# Patient Record
Sex: FEMALE | Race: BLACK OR AFRICAN AMERICAN | Employment: UNEMPLOYED | URBAN - METROPOLITAN AREA
[De-identification: names, ages, dates, MRNs, and addresses within clinical notes are randomized per-mention and may not be internally consistent; named-entity substitution may affect disease eponyms.]

---

## 2020-07-20 VITALS
BODY MASS INDEX: 26.98 KG/M2 | SYSTOLIC BLOOD PRESSURE: 158 MMHG | OXYGEN SATURATION: 98 % | DIASTOLIC BLOOD PRESSURE: 99 MMHG | HEART RATE: 74 BPM | WEIGHT: 158 LBS | HEIGHT: 64 IN | TEMPERATURE: 99.1 F | RESPIRATION RATE: 18 BRPM

## 2020-07-20 PROBLEM — E78.00 HYPERCHOLESTEREMIA: Status: ACTIVE | Noted: 2019-01-28

## 2020-07-20 PROBLEM — I10 ESSENTIAL (PRIMARY) HYPERTENSION: Status: ACTIVE | Noted: 2019-01-28

## 2020-07-20 PROBLEM — M54.30 SCIATICA: Status: ACTIVE | Noted: 2019-01-28

## 2020-07-20 PROBLEM — F31.9 BIPOLAR DISORDER, UNSPECIFIED (HCC): Status: ACTIVE | Noted: 2019-01-28

## 2020-07-20 RX ORDER — LISINOPRIL AND HYDROCHLOROTHIAZIDE 20; 25 MG/1; MG/1
TABLET ORAL
COMMUNITY
Start: 2020-07-01 | End: 2021-05-14

## 2020-07-20 RX ORDER — ALBUTEROL SULFATE 90 UG/1
AEROSOL, METERED RESPIRATORY (INHALATION)
COMMUNITY
Start: 2020-06-26 | End: 2021-09-22

## 2020-07-20 RX ORDER — ASPIRIN 81 MG/1
TABLET ORAL
COMMUNITY
Start: 2020-07-01 | End: 2021-06-14 | Stop reason: ALTCHOICE

## 2020-07-20 RX ORDER — BUPROPION HYDROCHLORIDE 150 MG/1
TABLET ORAL
COMMUNITY
Start: 2020-07-01 | End: 2020-12-04

## 2020-07-24 ENCOUNTER — OFFICE VISIT (OUTPATIENT)
Dept: PRIMARY CARE CLINIC | Age: 60
End: 2020-07-24

## 2020-07-24 VITALS
HEIGHT: 64 IN | DIASTOLIC BLOOD PRESSURE: 80 MMHG | OXYGEN SATURATION: 97 % | BODY MASS INDEX: 26.29 KG/M2 | SYSTOLIC BLOOD PRESSURE: 117 MMHG | WEIGHT: 154 LBS | HEART RATE: 80 BPM | TEMPERATURE: 98.9 F | RESPIRATION RATE: 18 BRPM

## 2020-07-24 DIAGNOSIS — I10 ESSENTIAL (PRIMARY) HYPERTENSION: ICD-10-CM

## 2020-07-24 DIAGNOSIS — F31.61 BIPOLAR DISORDER, CURRENT EPISODE MIXED, MILD (HCC): Primary | ICD-10-CM

## 2020-07-24 DIAGNOSIS — R07.89 OTHER CHEST PAIN: ICD-10-CM

## 2020-07-24 DIAGNOSIS — E87.6 LOW SERUM POTASSIUM: ICD-10-CM

## 2020-07-24 PROCEDURE — 99212 OFFICE O/P EST SF 10 MIN: CPT | Performed by: NURSE PRACTITIONER

## 2020-07-24 RX ORDER — RISPERIDONE 1 MG/1
1 TABLET, FILM COATED ORAL
Qty: 30 TAB | Refills: 1 | Status: SHIPPED | OUTPATIENT
Start: 2020-07-24 | End: 2020-12-01

## 2020-07-24 NOTE — PROGRESS NOTES
Chidi Yates is a 61 y.o. female who presents to the office today for the following:  Chief Complaint   Patient presents with    Follow-up     from er visit BP high, did EKG was normal. was at work when she felt faint and chest pain     Depression         Past Medical History:   Diagnosis Date    Bipolar disorder, unspecified ([address] 1/28/2019    Essential (primary) hypertension 1/28/2019    Hypercholesteremia 1/28/2019    Sciatica 1/28/2019       Past Surgical History:   Procedure Laterality Date    HX HERNIA REPAIR      HX HYSTERECTOMY         Family History   Problem Relation Age of Onset    Hypertension Mother     Diabetes Mother        Social History     Tobacco Use    Smoking status: Current Every Day Smoker     Packs/day: 0.50     Years: 21.00     Pack years: 10.50    Smokeless tobacco: Never Used   Substance Use Topics    Alcohol use: Not Currently     Comment: occasionally    Drug use: Never        HPI  51-year-old female who presents for follow-up from ED. reportedly seen on 7/18/2020 for chest pain and dizziness that came on suddenly at work. States that she was working when all of a sudden she felt \"heavy\" in chest and lightheaded. Went to ED and was evaluated. Told she did not have heart attack and likely non-cardiac but should see a cardiologist. Has not had any further pain or prior similar episodes. No prior diagnosed heart disease. Does report that she has been very depressed over the past year and worse in past 3-4 months. Started on the bupropion prescribed in 5/2020 but states she does not feel helping much. Still having periods of very depressed moods and at other times having too much energy and very agitated. Not sleeping well at night and having racing thoughts. 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  She denies any suicidal thoughts or attempts to harm self but has felt this way in past. Reports relationships with family have been strained recently due to moods. Still grieves from her  who passed away in 2009. Has dog at home that she feels is her support system. Was on risperdal couple of years ago before lost insurance and felt this helped her. Wants to see about going back on risperdal. Has not seen the psychiatrist due to she could not afford and was waiting on the [facility] relief program with [facility]. Review of Systems   Constitutional: Negative. HENT: Negative for congestion, ear pain, hearing loss and sore throat. Eyes: Negative. Negative for blurred vision, photophobia, pain, discharge and redness. Respiratory: Negative. Cardiovascular: Negative for chest pain, palpitations, orthopnea, claudication and leg swelling. Gastrointestinal: Negative for abdominal pain, blood in stool, constipation, diarrhea, heartburn, nausea and vomiting. Genitourinary: Negative for dysuria, flank pain, frequency, hematuria and urgency. Musculoskeletal: Negative. Negative for back pain, falls, joint pain, myalgias and neck pain. Skin: Negative for itching and rash. Neurological: Negative for dizziness, tingling, tremors, loss of consciousness, weakness and headaches. Psychiatric/Behavioral: Positive for depression. Negative for hallucinations, memory loss, substance abuse and suicidal ideas. The patient is nervous/anxious and has insomnia. Visit Vitals  /80   Pulse 80   Temp 98.9 °F (37.2 °C) (Oral)   Resp 18   Ht 5' 4\" (1.626 m)   Wt 154 lb (69.9 kg)   SpO2 97%   BMI 26.43 kg/m²       Physical Exam  Vitals signs and nursing note reviewed. Constitutional:       Appearance: Normal appearance. HENT:      Right Ear: Tympanic membrane normal.      Left Ear: Tympanic membrane normal.      Mouth/Throat:      Mouth: Mucous membranes are moist.      Pharynx: Oropharynx is clear.    Eyes:      Conjunctiva/sclera: Conjunctivae normal.      Pupils: Pupils are equal, round, and reactive to light. Neck:      Thyroid: No thyroid mass or thyromegaly. Vascular: No carotid bruit. Cardiovascular:      Rate and Rhythm: Normal rate and regular rhythm. Pulses: Normal pulses. Heart sounds: Normal heart sounds. Pulmonary:      Effort: Pulmonary effort is normal.      Breath sounds: Normal breath sounds. Abdominal:      General: Bowel sounds are normal.      Palpations: Abdomen is soft. Musculoskeletal: Normal range of motion. Lymphadenopathy:      Cervical: No cervical adenopathy. Skin:     General: Skin is warm and dry. Neurological:      Mental Status: She is alert and oriented to person, place, and time. Mental status is at baseline. Psychiatric:         Mood and Affect: Mood is depressed. Affect is tearful. Behavior: Behavior normal. Behavior is cooperative. Thought Content: Thought content is not paranoid. Thought content does not include homicidal or suicidal ideation. Judgment: Judgment normal.          1. Bipolar disorder, current episode mixed, mild ([facility] [address]  Reporting continued labile moods despite treatment with bupropion  (started 5/2020)  No suicidal thoughts reported or past attempts. Did not see psychiatrist when  referred due to she does not have insurance and cannot afford until paperwork cleared for discount program  Was on risperidone in past which helped her and wants to be resumed on this. Will start on risperidone 1mg nightly or may take 1/2 tab twice daily. We reviewed potential side effects. Advised if she develops worsening symptoms or suicidal thoughts, seek help immediately with provider or in ED  Encouraged to see psychiatrist and she is working on trying to do this. Plan follow up in 2 weeks to re-evaluate or sooner if needed  - risperiDONE (RisperDAL) 1 mg tablet; Take 1 Tab by mouth nightly. Dispense: 30 Tab; Refill: 1  - psychiatry referral    2.  Essential (primary) hypertension  Blood pressure is at goal and continue medication as directed    3. Other chest pain  Work up in ED reported as normal per patient and told likely non-cardiac  She feels symptoms related to her anxiety/depression   Recommend further eval with cardiology but she would like to continue to monitor as she has not had any further chest pain or sob. Advised that more immediate cardiology referral is recommended to validate non-cardiac cause as symptoms suspcious and have made aware that undiagnosed coronary disease  can lead to life threatening event. Verbalizes understanding  Does agree to for me to send cardiology referral and she is working with [facility] to get enrolled for discount program due to no insurance  Reports she will contact provider if this is reoccurring or go to ED immediately.  -Cardiology Referral    4. Low serum potassium  Reports was treated for low potassium in ED and told to have rechecked  - POTASSIUM          Follow-up and Dispositions    · Return in about 1 month (around 8/24/2020), or if symptoms worsen or fail to improve.

## 2020-07-25 LAB — POTASSIUM SERPL-SCNC: 3.8 MMOL/L (ref 3.5–5.2)

## 2020-10-05 ENCOUNTER — TRANSCRIBE ORDER (OUTPATIENT)
Dept: SCHEDULING | Age: 60
End: 2020-10-05

## 2020-10-05 DIAGNOSIS — R07.9 CHEST PAIN: Primary | ICD-10-CM

## 2020-11-02 ENCOUNTER — OFFICE VISIT (OUTPATIENT)
Dept: PRIMARY CARE CLINIC | Age: 60
End: 2020-11-02
Payer: COMMERCIAL

## 2020-11-02 VITALS — RESPIRATION RATE: 20 BRPM | HEART RATE: 91 BPM | TEMPERATURE: 98.8 F | OXYGEN SATURATION: 95 %

## 2020-11-02 DIAGNOSIS — N39.3 STRESS INCONTINENCE: ICD-10-CM

## 2020-11-02 DIAGNOSIS — R05.3 PERSISTENT COUGH: Primary | ICD-10-CM

## 2020-11-02 DIAGNOSIS — F31.61 BIPOLAR DISORDER, CURRENT EPISODE MIXED, MILD (HCC): ICD-10-CM

## 2020-11-02 DIAGNOSIS — F51.01 PRIMARY INSOMNIA: ICD-10-CM

## 2020-11-02 DIAGNOSIS — R07.9 CHEST PAIN, UNSPECIFIED TYPE: ICD-10-CM

## 2020-11-02 PROCEDURE — 99214 OFFICE O/P EST MOD 30 MIN: CPT | Performed by: NURSE PRACTITIONER

## 2020-11-02 RX ORDER — PREDNISONE 20 MG/1
20 TABLET ORAL
Qty: 5 TAB | Refills: 0 | Status: SHIPPED | OUTPATIENT
Start: 2020-11-02 | End: 2020-12-01 | Stop reason: ALTCHOICE

## 2020-11-02 RX ORDER — TRAZODONE HYDROCHLORIDE 50 MG/1
50 TABLET ORAL
Qty: 30 TAB | Refills: 0 | Status: SHIPPED | OUTPATIENT
Start: 2020-11-02 | End: 2020-12-04

## 2020-11-02 RX ORDER — AZITHROMYCIN 250 MG/1
TABLET, FILM COATED ORAL
Qty: 6 TAB | Refills: 0 | Status: SHIPPED | OUTPATIENT
Start: 2020-11-02 | End: 2020-12-01 | Stop reason: ALTCHOICE

## 2020-11-02 NOTE — PROGRESS NOTES
Adonis Shah is a 61 y.o. female who presents to the office today for the following:    Chief Complaint   Patient presents with    Cough     really bad coughed so much made her chest and ribs hurt     Bipolar       Past Medical History:   Diagnosis Date    Bipolar disorder, unspecified ([address] 1/28/2019    Essential (primary) hypertension 1/28/2019    Hypercholesteremia 1/28/2019    Sciatica 1/28/2019       Past Surgical History:   Procedure Laterality Date    HX HERNIA REPAIR      HX HYSTERECTOMY          Family History   Problem Relation Age of Onset    Hypertension Mother     Diabetes Mother         Social History     Tobacco Use    Smoking status: Current Every Day Smoker     Packs/day: 0.50     Years: 21.00     Pack years: 10.50    Smokeless tobacco: Never Used   Substance Use Topics    Alcohol use: Not Currently     Comment: occasionally    Drug use: Never        HPI  Patient here with c/o 3 weeks of persistent cough. States that she is getting up a lot of mucus from chest and recently had more wheezing. Uses albuterol inhaler prn for the wheezing. Does hurt in chest when coughing but feels this is mostly soreness and different from the prior chest pain she had. No fever or breathing difficulty. Is a daily smoker so reports she coughs \"all the time\" but much worse right now. Denies any known exposure to anyone who is sick. Has had problems with stress incontinence but since cough started this has been worse. Would like script for adult briefs as she uses these daily and was told that insurance will cover. She is also following up from last visit regarding some chest pain she had been seen in ED for and also for bipolar disorder. States she has appointment 11/9/20 for stress test at [facility]. Has also began the Risperdal in addition to the bupropion she was on and feels this is helping with moods. Still has some depressive symptoms but no justin or suicidal thoughts.  While she feels moods are improved, difficulty sleeping has persisted. She wants to see if she can be started on something for sleep and also needs referral since she now has insurance. Current Outpatient Medications on File Prior to Visit   Medication Sig    risperiDONE (RisperDAL) 1 mg tablet Take 1 Tab by mouth nightly.  albuterol (PROVENTIL HFA, VENTOLIN HFA, PROAIR HFA) 90 mcg/actuation inhaler INHALE 2 PUFFS BY MOUTH EVERY 4 TO 6 HOURS AS NEEDED    aspirin delayed-release 81 mg tablet TAKE 1 TABLET BY MOUTH ONCE DAILY    buPROPion XL (WELLBUTRIN XL) 150 mg tablet TAKE 1 TABLET BY MOUTH ONCE DAILY    lisinopril-hydroCHLOROthiazide (PRINZIDE, ZESTORETIC) 20-25 mg per tablet TAKE 1 TABLET BY MOUTH ONCE DAILY IN THE MORNING     No current facility-administered medications on file prior to visit. Medications Ordered Today   Medications    predniSONE (DELTASONE) 20 mg tablet     Sig: Take 20 mg by mouth daily (with breakfast). Dispense:  5 Tab     Refill:  0    azithromycin (ZITHROMAX) 250 mg tablet     Sig: Take 2 tablets today, then take 1 tablet daily x 4 days     Dispense:  6 Tab     Refill:  0    traZODone (DESYREL) 50 mg tablet     Sig: Take 1 Tab by mouth nightly. Dispense:  30 Tab     Refill:  0        Review of Systems   Constitutional: Negative. HENT: Positive for congestion. Negative for ear pain, sinus pain and sore throat. Eyes: Negative. Respiratory: Positive for cough, sputum production and wheezing. Negative for hemoptysis. Cardiovascular: Positive for chest pain. Negative for palpitations, claudication and leg swelling. Gastrointestinal: Negative. Genitourinary: Negative. Musculoskeletal: Positive for myalgias. Negative for back pain, joint pain and neck pain. Skin: Negative. Neurological: Negative. Psychiatric/Behavioral: Positive for depression. Negative for hallucinations, memory loss, substance abuse and suicidal ideas.  The patient is nervous/anxious and has insomnia. Visit Vitals  Pulse 91   Temp 98.8 °F (37.1 °C)   Resp 20   SpO2 95%       Physical Exam  Vitals signs and nursing note reviewed. Constitutional:       Appearance: Normal appearance. HENT:      Right Ear: Tympanic membrane normal.      Left Ear: Tympanic membrane normal.      Nose: Nose normal.      Mouth/Throat:      Mouth: Mucous membranes are moist.      Pharynx: Oropharynx is clear. Eyes:      Pupils: Pupils are equal, round, and reactive to light. Cardiovascular:      Rate and Rhythm: Normal rate and regular rhythm. Pulses: Normal pulses. Heart sounds: Normal heart sounds. Pulmonary:      Effort: Pulmonary effort is normal.      Breath sounds: Normal breath sounds. Abdominal:      General: Bowel sounds are normal.      Palpations: Abdomen is soft. Musculoskeletal: Normal range of motion. Lymphadenopathy:      Cervical: No cervical adenopathy. Skin:     General: Skin is warm and dry. Neurological:      Mental Status: She is alert. Mental status is at baseline. 1. Persistent cough  Will start on prednisone and antibiotic as directed   Rule out covid 19 though symptoms likely more exacerbation of copd  Encouraged supportive care with rest, increased fluids, tylenol prn for pain or fever and cool mist humidifier  We reviewed  [facility] home isolation guidelines until covid 19 ruled out  Will discuss further recommendations pending results from test   Notify provider immediately or seek emergency care  if she develops breathing difficulty, high fevers, etc.   - predniSONE (DELTASONE) 20 mg tablet; Take 20 mg by mouth daily (with breakfast). Dispense: 5 Tab; Refill: 0  - azithromycin (ZITHROMAX) 250 mg tablet; Take 2 tablets today, then take 1 tablet daily x 4 days  Dispense: 6 Tab; Refill: 0  - NOVEL CORONAVIRUS (COVID-19); Future    2.  Bipolar disorder, current episode mixed, mild ([facility] [address]  Continue on wellbutrin and risperdal as directed  Reports moods are improved but still has some depressive symptoms  Her primary concern is related to sleeping difficulty which we are addressing  I have referred to psychiatry since she now has insurance but cannot obtain appointment sooner than 1/2021.   - REFERRAL TO PSYCHIATRY    3. Primary insomnia  Will start on trazodone as directed. Reviewed potential side effects and verbalizes understanding. Has been on this in past and did well with it  Plan for follow up in 2 weeks to re-evaluate  Again she is being referred to psychiatry  - traZODone (DESYREL) 50 mg tablet; Take 1 Tab by mouth nightly. Dispense: 30 Tab; Refill: 0  - REFERRAL TO PSYCHIATRY    4. Chest pain, unspecified type  She is set up for stress test on 11/9/20 with [facility]   Advised if develops any sustained or concerning chest pain prior to stress test, seek emergency care     5.  Stress incontinence  This has been exacerbated by recent persistent cough  Will send order for adult briefs   - AMB SUPPLY ORDER      Patient verbalizes understanding of plan of care as discussed above

## 2020-11-03 LAB — SARS-COV-2, NAA: NOT DETECTED

## 2020-11-04 ENCOUNTER — TELEPHONE (OUTPATIENT)
Dept: PRIMARY CARE CLINIC | Age: 60
End: 2020-11-04

## 2020-11-04 NOTE — TELEPHONE ENCOUNTER
called pt and informed them of the results      ----- Message from Maicol Ya NP sent at 11/4/2020  3:36 AM EST -----  Please let patient/parent know that result is negative. They were probably  not infected at the time the sample was collected. However, that does not mean you will not get sick.    The test result only means that you did not have COVID-19 at the time of test  Continue to use preventive measures to protect yourself and others   Follow up any persistent or worsening symptoms  For more information visit the CDC website: DotProtection.gl

## 2020-11-04 NOTE — PROGRESS NOTES
Please let patient/parent know that result is negative. They were probably  not infected at the time the sample was collected. However, that does not mean you will not get sick.    The test result only means that you did not have COVID-19 at the time of test  Continue to use preventive measures to protect yourself and others   Follow up any persistent or worsening symptoms  For more information visit the CDC website: DotProtection.gl

## 2020-11-10 DIAGNOSIS — F31.61 BIPOLAR DISORDER, CURRENT EPISODE MIXED, MILD (HCC): Primary | ICD-10-CM

## 2020-11-10 RX ORDER — RISPERIDONE 2 MG/1
2 TABLET, FILM COATED ORAL DAILY
Qty: 90 TAB | Refills: 0 | Status: SHIPPED | OUTPATIENT
Start: 2020-11-10 | End: 2020-12-04

## 2020-12-01 ENCOUNTER — HOSPITAL ENCOUNTER (OUTPATIENT)
Dept: GENERAL RADIOLOGY | Age: 60
Discharge: HOME | End: 2020-12-01
Attending: NURSE PRACTITIONER
Payer: COMMERCIAL

## 2020-12-01 ENCOUNTER — HOSPITAL ENCOUNTER (OUTPATIENT)
Dept: NON INVASIVE DIAGNOSTICS | Age: 60
Discharge: HOME | End: 2020-12-01
Attending: NURSE PRACTITIONER
Payer: COMMERCIAL

## 2020-12-01 ENCOUNTER — TELEPHONE (OUTPATIENT)
Dept: PRIMARY CARE CLINIC | Age: 60
End: 2020-12-01

## 2020-12-01 ENCOUNTER — HOSPITAL ENCOUNTER (OUTPATIENT)
Dept: NUCLEAR MEDICINE | Age: 60
Discharge: HOME | End: 2020-12-01
Attending: NURSE PRACTITIONER
Payer: COMMERCIAL

## 2020-12-01 ENCOUNTER — HOSPITAL ENCOUNTER (OUTPATIENT)
Dept: VASCULAR SURGERY | Age: 60
Discharge: HOME | End: 2020-12-01
Attending: NURSE PRACTITIONER
Payer: COMMERCIAL

## 2020-12-01 DIAGNOSIS — R07.9 CHEST PAIN: ICD-10-CM

## 2020-12-01 DIAGNOSIS — R05.3 PERSISTENT COUGH: ICD-10-CM

## 2020-12-01 DIAGNOSIS — S79.912A HIP INJURY, LEFT, INITIAL ENCOUNTER: ICD-10-CM

## 2020-12-01 DIAGNOSIS — S99.912A INJURY OF LEFT ANKLE, INITIAL ENCOUNTER: ICD-10-CM

## 2020-12-01 DIAGNOSIS — S99.912A INJURY OF LEFT ANKLE, INITIAL ENCOUNTER: Primary | ICD-10-CM

## 2020-12-01 LAB
ECHO AO ROOT DIAM: 2.58 CM
ECHO AV AREA PEAK VELOCITY: 2.2 CM2
ECHO AV AREA VTI: 2.39 CM2
ECHO AV MEAN GRADIENT: 3.92 MMHG
ECHO AV MEAN VELOCITY: 91.02 CM/S
ECHO AV PEAK GRADIENT: 8.42 MMHG
ECHO AV PEAK VELOCITY: 145.04 CM/S
ECHO AV VTI: 25.74 CM
ECHO LA VOL 2C: 47.84 ML (ref 22–52)
ECHO LA VOL 4C: 29.25 ML (ref 22–52)
ECHO LA VOL BP: 42.06 ML (ref 22–52)
ECHO LV E' LATERAL VELOCITY: 70 CM/S
ECHO LV EDV A2C: 108.4 ML
ECHO LV EDV BP: 66.84 ML (ref 56–104)
ECHO LV EJECTION FRACTION A2C: 61 PERCENT
ECHO LV EJECTION FRACTION A4C: 55 PERCENT
ECHO LV EJECTION FRACTION BIPLANE: 59.9 PERCENT (ref 55–100)
ECHO LV ESV A2C: 26.68 ML
ECHO LV ESV BP: 26.79 ML (ref 19–49)
ECHO LV INTERNAL DIMENSION DIASTOLIC: 4.82 CM (ref 3.9–5.3)
ECHO LV INTERNAL DIMENSION SYSTOLIC: 3.54 CM
ECHO LV IVSD: 1 CM (ref 0.6–0.9)
ECHO LV MASS 2D: 171.4 G (ref 67–162)
ECHO LV POSTERIOR WALL DIASTOLIC: 1 CM (ref 0.6–0.9)
ECHO LVOT DIAM: 1.97 CM
ECHO LVOT PEAK GRADIENT: 4.41 MMHG
ECHO LVOT PEAK VELOCITY: 104.95 CM/S
ECHO LVOT SV: 67.3 ML
ECHO LVOT SV: 67.3 ML
ECHO LVOT VTI: 20.24 CM
ECHO MV A VELOCITY: 84.63 CM/S
ECHO MV AREA PHT: 2.74 CM2
ECHO MV AREA PHT: 2.74 CM2
ECHO MV E DECELERATION TIME (DT): 276.38 MS
ECHO MV E VELOCITY: 62.44 CM/S
ECHO MV E/A RATIO: 0.74
ECHO MV E/E' LATERAL: 0.89
ECHO MV PRESSURE HALF TIME (PHT): 80.15 MS
ECHO RV INTERNAL DIMENSION: 2.89 CM
LVOT MG: 2.1 MMHG
STRESS ANGINA INDEX: 0
STRESS BASELINE DIAS BP: 83 MMHG
STRESS BASELINE HR: 81 BPM
STRESS BASELINE SYS BP: 120 MMHG
STRESS ESTIMATED WORKLOAD: 7 METS
STRESS EXERCISE DUR MIN: NORMAL MIN:SEC
STRESS PEAK DIAS BP: 98 MMHG
STRESS PEAK SYS BP: 190 MMHG
STRESS PERCENT HR ACHIEVED: 89 %
STRESS POST PEAK HR: 142 BPM
STRESS RATE PRESSURE PRODUCT: NORMAL BPM*MMHG
STRESS TARGET HR: 160 BPM

## 2020-12-01 PROCEDURE — 73502 X-RAY EXAM HIP UNI 2-3 VIEWS: CPT

## 2020-12-01 PROCEDURE — 93017 CV STRESS TEST TRACING ONLY: CPT

## 2020-12-01 PROCEDURE — 71046 X-RAY EXAM CHEST 2 VIEWS: CPT

## 2020-12-01 PROCEDURE — 73610 X-RAY EXAM OF ANKLE: CPT

## 2020-12-01 PROCEDURE — A9500 TC99M SESTAMIBI: HCPCS

## 2020-12-01 PROCEDURE — 93306 TTE W/DOPPLER COMPLETE: CPT

## 2020-12-01 RX ORDER — NAPROXEN 500 MG/1
500 TABLET ORAL 2 TIMES DAILY WITH MEALS
Qty: 60 TAB | Refills: 0 | Status: ON HOLD | OUTPATIENT
Start: 2020-12-01 | End: 2022-08-19

## 2020-12-01 NOTE — TELEPHONE ENCOUNTER
Spoke with patient. Pain from fall is reported in left ankle and hip.  Sent order for x-rays and she has appointment in am.

## 2020-12-02 ENCOUNTER — OFFICE VISIT (OUTPATIENT)
Dept: PRIMARY CARE CLINIC | Age: 60
End: 2020-12-02
Payer: COMMERCIAL

## 2020-12-02 VITALS
HEIGHT: 63 IN | SYSTOLIC BLOOD PRESSURE: 139 MMHG | BODY MASS INDEX: 30.65 KG/M2 | RESPIRATION RATE: 18 BRPM | TEMPERATURE: 95.8 F | DIASTOLIC BLOOD PRESSURE: 71 MMHG | WEIGHT: 173 LBS | OXYGEN SATURATION: 99 % | HEART RATE: 80 BPM

## 2020-12-02 DIAGNOSIS — S79.912A HIP INJURY, LEFT, INITIAL ENCOUNTER: ICD-10-CM

## 2020-12-02 DIAGNOSIS — S93.402A SPRAIN OF LEFT ANKLE, UNSPECIFIED LIGAMENT, INITIAL ENCOUNTER: Primary | ICD-10-CM

## 2020-12-02 DIAGNOSIS — S39.93XA INJURY OF PELVIS, INITIAL ENCOUNTER: ICD-10-CM

## 2020-12-02 PROCEDURE — 99213 OFFICE O/P EST LOW 20 MIN: CPT | Performed by: NURSE PRACTITIONER

## 2020-12-02 RX ORDER — TRAZODONE HYDROCHLORIDE 50 MG/1
TABLET ORAL
COMMUNITY
Start: 2020-11-02 | End: 2020-12-02 | Stop reason: SDUPTHER

## 2020-12-04 ENCOUNTER — VIRTUAL VISIT (OUTPATIENT)
Dept: BEHAVIORAL/MENTAL HEALTH CLINIC | Age: 60
End: 2020-12-04
Payer: COMMERCIAL

## 2020-12-04 DIAGNOSIS — M25.559 ACUTE HIP PAIN, UNSPECIFIED LATERALITY: Primary | ICD-10-CM

## 2020-12-04 DIAGNOSIS — F33.1 MODERATE EPISODE OF RECURRENT MAJOR DEPRESSIVE DISORDER (HCC): ICD-10-CM

## 2020-12-04 DIAGNOSIS — F31.60 MIXED BIPOLAR DISORDER (HCC): Primary | ICD-10-CM

## 2020-12-04 PROCEDURE — 99441 PR PHYS/QHP TELEPHONE EVALUATION 5-10 MIN: CPT | Performed by: NURSE PRACTITIONER

## 2020-12-04 RX ORDER — QUETIAPINE FUMARATE 50 MG/1
50 TABLET, FILM COATED ORAL
Qty: 90 TAB | Refills: 0 | Status: SHIPPED | OUTPATIENT
Start: 2020-12-04 | End: 2021-04-20

## 2020-12-04 RX ORDER — TRAMADOL HYDROCHLORIDE 50 MG/1
50 TABLET ORAL
Qty: 12 TAB | Refills: 0 | Status: SHIPPED | OUTPATIENT
Start: 2020-12-04 | End: 2020-12-07

## 2020-12-04 RX ORDER — SERTRALINE HYDROCHLORIDE 25 MG/1
25 TABLET, FILM COATED ORAL DAILY
Qty: 90 TAB | Refills: 0 | Status: SHIPPED | OUTPATIENT
Start: 2020-12-04 | End: 2021-04-20

## 2020-12-04 NOTE — PROGRESS NOTES
Vincent Lewis is a 61 y.o. female who presents to the office today for the following:    Chief Complaint   Patient presents with    Hip Pain     left       Past Medical History:   Diagnosis Date    Asthma     Bipolar disorder, unspecified ([facility] [address] 1/28/2019    Essential (primary) hypertension 1/28/2019    Hypercholesteremia 1/28/2019    Sciatica 1/28/2019       Past Surgical History:   Procedure Laterality Date    HX HERNIA REPAIR      HX HYSTERECTOMY          Family History   Problem Relation Age of Onset    Hypertension Mother     Diabetes Mother         Social History     Tobacco Use    Smoking status: Current Every Day Smoker     Packs/day: 0.50     Years: 21.00     Pack years: 10.50    Smokeless tobacco: Never Used   Substance Use Topics    Alcohol use: Not Currently     Comment: occasionally    Drug use: Never        HPI  Patient here after falling 1 day ago. States that she was walking in her yard and there was a new hole due to recent construction in the area. Reports she stepped into hole which caused her ankle to role outwards and then fell back on left buttocks. Was able to get up and walk but reports having pain in left hip/buttock and ankle. Took some naproxen but states that pain is just \"throbbing. \" No low back pain or knee pain reported. Denies any numbness or tingling. Has not had prior injuries or pain to this area in past    Current Outpatient Medications on File Prior to Visit   Medication Sig    naproxen (NAPROSYN) 500 mg tablet Take 1 Tab by mouth two (2) times daily (with meals).  risperiDONE (RisperDAL) 2 mg tablet Take 1 Tab by mouth daily.  traZODone (DESYREL) 50 mg tablet Take 1 Tab by mouth nightly.     albuterol (PROVENTIL HFA, VENTOLIN HFA, PROAIR HFA) 90 mcg/actuation inhaler INHALE 2 PUFFS BY MOUTH EVERY 4 TO 6 HOURS AS NEEDED    aspirin delayed-release 81 mg tablet TAKE 1 TABLET BY MOUTH ONCE DAILY    buPROPion XL (WELLBUTRIN XL) 150 mg tablet TAKE 1 TABLET BY MOUTH ONCE DAILY    lisinopril-hydroCHLOROthiazide (PRINZIDE, ZESTORETIC) 20-25 mg per tablet TAKE 1 TABLET BY MOUTH ONCE DAILY IN THE MORNING     No current facility-administered medications on file prior to visit. No orders of the defined types were placed in this encounter. Review of Systems   Constitutional: Negative. Respiratory: Negative. Cardiovascular: Negative. Gastrointestinal: Negative. Genitourinary: Negative. Musculoskeletal: Positive for falls, joint pain and myalgias. Negative for back pain and neck pain. Neurological: Negative for dizziness, tingling, tremors, sensory change, speech change, focal weakness, weakness and headaches. Visit Vitals  /71 (BP 1 Location: Left arm, BP Patient Position: Sitting)   Pulse 80   Temp (!) 95.8 °F (35.4 °C) (Tympanic)   Resp 18   Ht 5' 3\" (1.6 m)   Wt 173 lb (78.5 kg)   SpO2 99%   BMI 30.65 kg/m²       Physical Exam  Vitals signs and nursing note reviewed. Constitutional:       Appearance: Normal appearance. She is obese. Cardiovascular:      Rate and Rhythm: Normal rate and regular rhythm. Pulses: Normal pulses. Heart sounds: Normal heart sounds. Pulmonary:      Effort: Pulmonary effort is normal.      Breath sounds: Normal breath sounds. Abdominal:      General: Bowel sounds are normal.      Palpations: Abdomen is soft. Musculoskeletal: Normal range of motion. Left hip: She exhibits tenderness. She exhibits normal range of motion, no swelling, no crepitus and no deformity. Left ankle: She exhibits normal range of motion, no swelling and normal pulse. Tenderness. Lateral malleolus tenderness found. No medial malleolus tenderness found. Right lower leg: No edema. Left lower leg: No edema. Skin:     General: Skin is warm and dry. Neurological:      Mental Status: She is alert. Mental status is at baseline.             1. Sprain of left ankle, unspecified ligament, initial encounter      2. Hip injury, left, initial encounter    - CT PELV WO CONT; Future    3. Injury of pelvis, initial encounter      X-rays ordered prior to visit do not show acute fractures of hip or ankle  She has concerns due pain especially in hip and pelvis and will check CT  May continue naproxen w/ food prn and tylenol   Rest and heat prn  Discuss further recommendations pending results from CT            Patient verbalizes understanding of plan of care as discussed above    Follow-up and Dispositions    · Return in about 1 week (around 12/9/2020), or if symptoms worsen or fail to improve.

## 2020-12-04 NOTE — PROGRESS NOTES
Juan Hi is a 61 y.o. female who presents today for the following:  Chief Complaint   Patient presents with    Follow-up     \"I've been out of prescriptions. \"    Bipolar     Patient is unable to do virtual visit, telephone visit required. Allergies   Allergen Reactions    Hismanal Anaphylaxis       Current Outpatient Medications   Medication Sig    QUEtiapine (SEROquel) 50 mg tablet Take 1 Tab by mouth nightly for 90 days.  sertraline (ZOLOFT) 25 mg tablet Take 1 Tab by mouth daily for 90 days. Indications: anxiousness associated with depression    naproxen (NAPROSYN) 500 mg tablet Take 1 Tab by mouth two (2) times daily (with meals).  albuterol (PROVENTIL HFA, VENTOLIN HFA, PROAIR HFA) 90 mcg/actuation inhaler INHALE 2 PUFFS BY MOUTH EVERY 4 TO 6 HOURS AS NEEDED    aspirin delayed-release 81 mg tablet TAKE 1 TABLET BY MOUTH ONCE DAILY    lisinopril-hydroCHLOROthiazide (PRINZIDE, ZESTORETIC) 20-25 mg per tablet TAKE 1 TABLET BY MOUTH ONCE DAILY IN THE MORNING     No current facility-administered medications for this visit.         Past Medical History:   Diagnosis Date    Asthma     Bipolar disorder, unspecified ([facility] 1/28/2019    Essential (primary) hypertension 1/28/2019    Hypercholesteremia 1/28/2019    Sciatica 1/28/2019       Past Surgical History:   Procedure Laterality Date    HX HERNIA REPAIR      HX HYSTERECTOMY         Family History   Problem Relation Age of Onset    Hypertension Mother     Diabetes Mother        Social History     Socioeconomic History    Marital status:      Spouse name: Not on file    Number of children: Not on file    Years of education: Not on file    Highest education level: Not on file   Occupational History    Not on file   Social Needs    Financial resource strain: Not on file    Food insecurity     Worry: Not on file     Inability: Not on file    Transportation needs     Medical: Not on file     Non-medical: Not on file   Tobacco Use    Smoking status: Current Every Day Smoker     Packs/day: 1.00     Years: 21.00     Pack years: 21.00    Smokeless tobacco: Never Used   Substance and Sexual Activity    Alcohol use: Not Currently     Comment: occasionally    Drug use: Never    Sexual activity: Not Currently   Lifestyle    Physical activity     Days per week: Not on file     Minutes per session: Not on file    Stress: Not on file   Relationships    Social connections     Talks on phone: Not on file     Gets together: Not on file     Attends Hoahaoism service: Not on file     Active member of club or organization: Not on file     Attends meetings of clubs or organizations: Not on file     Relationship status: Not on file    Intimate partner violence     Fear of current or ex partner: Not on file     Emotionally abused: Not on file     Physically abused: Not on file     Forced sexual activity: Not on file   Other Topics Concern    Not on file   Social History Narrative    Not on file         Ms. Chani Millan is established to the practice but new to me. She follows up in the clinic for mixed bipolar disorder. Patient was unable to do virtual visit due to technical issues, telephone visit required. She was last seen by Dr. Josefina Bonner February 23, 2018 and was prescribed Lexapro 10 mg tablet take 1 tablet daily and Seroquel 200 mg tablet take 1 tablet at bedtime. On today's visit, patient reports that she had been off of psychotropic medication until a few months ago which she was started on risperidone and bupropion by her primary care provider. She is requesting different medications because she does not feel that risperidone and bupropion is working. Patient presents tearful, anxious and depressed. She reports feeling depressed since the loss of her  in 2009. She also mentions that she lost her mother November 30, 2019.   She reports increased anxiety with panic attacks, feeling overwhelmed and stressed and having racing thoughts, poor sleep and appetite. She reports feeling depressed on most days and having crying spells multiple times a day. Patient also mentions that she is easily agitated and irritable and often times have \"full-blown negative conversations\" with herself almost daily. No suicidal homicidal thinking noted, risk for suicide is low to moderate but there is no acute concern at this time. According to patient, she is unable to maintain employment and has worked 8 jobs in the past 2 years. She smokes 1 pack of cigarettes daily and denies alcohol and drug use. Patient lives alone. She is receptive to counseling recommendation and plans to follow-up with [facility].        Review of Systems   Respiratory: Positive for shortness of breath (related to asthma). Musculoskeletal: Positive for falls (fell in yard 2 days ago) and joint pain. Psychiatric/Behavioral: Positive for depression. The patient is nervous/anxious and has insomnia. All other systems reviewed and are negative. There were no vitals taken for this visit. Physical Exam  Psychiatric:         Attention and Perception: Attention and perception normal.         Mood and Affect: Mood is anxious and depressed. Speech: Speech normal.         Behavior: Behavior is agitated. Thought Content: Thought content is paranoid. Cognition and Memory: Cognition and memory normal.         Judgment: Judgment is impulsive. Plan:    Discontinue bupropion and risperidone. Start Seroquel 50 mg tablet take 1 tablet at bedtime and Zoloft 25 mg tablet take 1 tablet daily. Follow-up on counseling recommendation. Follow-up with primary care provider as appropriate. Advised patient to call 911 or go to the emergency department for suicidal homicidal thinking. Advised patient to avoid smoking, alcohol and drug use. There are no diagnoses linked to this encounter.

## 2020-12-11 ENCOUNTER — HOSPITAL ENCOUNTER (OUTPATIENT)
Dept: CT IMAGING | Age: 60
Discharge: HOME | End: 2020-12-11
Attending: NURSE PRACTITIONER
Payer: COMMERCIAL

## 2020-12-11 DIAGNOSIS — S79.912A HIP INJURY, LEFT, INITIAL ENCOUNTER: ICD-10-CM

## 2020-12-11 DIAGNOSIS — S39.93XA INJURY OF PELVIS, INITIAL ENCOUNTER: ICD-10-CM

## 2020-12-11 PROCEDURE — 72192 CT PELVIS W/O DYE: CPT

## 2020-12-17 ENCOUNTER — TELEPHONE (OUTPATIENT)
Dept: PRIMARY CARE CLINIC | Age: 60
End: 2020-12-17

## 2020-12-17 DIAGNOSIS — S79.912A HIP INJURY, LEFT, INITIAL ENCOUNTER: Primary | ICD-10-CM

## 2020-12-17 NOTE — PROGRESS NOTES
Please let patient know that CT did show mild degenerative changes in bilateral hips and imaged areas of her lumbar. No obvious fracture. If she would like we can set her up with orthopedic to evaluate if medication not helping.

## 2020-12-30 ENCOUNTER — OFFICE VISIT (OUTPATIENT)
Dept: ORTHOPEDIC SURGERY | Age: 60
End: 2020-12-30
Payer: COMMERCIAL

## 2020-12-30 VITALS — BODY MASS INDEX: 30.36 KG/M2 | HEIGHT: 62 IN | WEIGHT: 165 LBS

## 2020-12-30 DIAGNOSIS — M54.42 LOW BACK PAIN WITH LEFT-SIDED SCIATICA, UNSPECIFIED BACK PAIN LATERALITY, UNSPECIFIED CHRONICITY: Primary | ICD-10-CM

## 2020-12-30 PROCEDURE — 99203 OFFICE O/P NEW LOW 30 MIN: CPT | Performed by: ORTHOPAEDIC SURGERY

## 2020-12-30 RX ORDER — METHYLPREDNISOLONE 4 MG/1
TABLET ORAL
Qty: 1 DOSE PACK | Refills: 0 | Status: SHIPPED | OUTPATIENT
Start: 2020-12-30 | End: 2021-06-14 | Stop reason: ALTCHOICE

## 2020-12-30 NOTE — PROGRESS NOTES
Name: Jung Rojo    : 1960     Service Dept: [address] [place] [address] and Sports Medicine    Patient's Pharmacies:    [facility] [address] [place] [address], [address]  [address] [address]  Phone: [phone] Fax: [phone]       Chief Complaint   Patient presents with    Hip Pain    Ankle Pain    Leg Pain        Visit Vitals  Ht 5' 2\" (1.575 m)   Wt 165 lb (74.8 kg)   BMI 30.18 kg/m²      Allergies   Allergen Reactions    Hismanal Anaphylaxis      Current Outpatient Medications   Medication Sig Dispense Refill    methylPREDNISolone (MEDROL DOSEPACK) 4 mg tablet Per dose pack instructions 1 Dose Pack 0    QUEtiapine (SEROquel) 50 mg tablet Take 1 Tab by mouth nightly for 90 days. 90 Tab 0    sertraline (ZOLOFT) 25 mg tablet Take 1 Tab by mouth daily for 90 days. Indications: anxiousness associated with depression 90 Tab 0    naproxen (NAPROSYN) 500 mg tablet Take 1 Tab by mouth two (2) times daily (with meals).  60 Tab 0    albuterol (PROVENTIL HFA, VENTOLIN HFA, PROAIR HFA) 90 mcg/actuation inhaler INHALE 2 PUFFS BY MOUTH EVERY 4 TO 6 HOURS AS NEEDED      aspirin delayed-release 81 mg tablet TAKE 1 TABLET BY MOUTH ONCE DAILY      lisinopril-hydroCHLOROthiazide (PRINZIDE, ZESTORETIC) 20-25 mg per tablet TAKE 1 TABLET BY MOUTH ONCE DAILY IN THE MORNING        Patient Active Problem List   Diagnosis Code    [facility], [facility] F31.9    Hypercholesteremia E78.00    Essential (primary) hypertension I10    Sciatica M54.30    Other chest pain R07.89    Low serum potassium E87.6      Family History   Problem Relation Age of Onset    Hypertension Mother     Diabetes Mother     No Known Problems Father       Social History     Socioeconomic History    Marital status:      Spouse name: Not on file    Number of children: Not on file    Years of education: Not on file    Highest education level: Not on file   Tobacco Use    Smoking status: Current Every Day Smoker     Packs/day: 1.00     Years: 21.00     Pack years: 21.00    Smokeless tobacco: Never Used   Substance and Sexual Activity    Alcohol use: Not Currently     Comment: occasionally    Drug use: Never    Sexual activity: Not Currently      Past Surgical History:   Procedure Laterality Date    HX HERNIA REPAIR      HX HYSTERECTOMY        Past Medical History:   Diagnosis Date    Asthma     Bipolar disorder, unspecified ([facility] [address] 1/28/2019    Essential (primary) hypertension 1/28/2019    Hypercholesteremia 1/28/2019    Sciatica 1/28/2019        I have reviewed and agree with [facility] and ROS and intake form in chart and the record. Review of Systems:   Patient is a pleasant appearing individual, appropriately dressed, well hydrated, well nourished, who is alert, appropriately oriented for age, and in no acute distress with a normal gait and normal affect who does not appear to be in any significant pain. Physical Exam:  The patient's back is neurovascularly intact and appears to have good symmetry on exam with no muscle atrophy noted. Normal curvature of the spine with no tenderness to palpation. Full range of motion in all planes and full strength. No rashes, echymosis or other skin lesions noted. The patients bilateral lower extremities are grossly neurovascularly intact with good cap refill, full range of motion and strength. No swelling, echymosis or instability noted. No tenderness to palpation. No foot drop present and patient has a normal gait.  Both legs have negative chavez's and negative lachman's but LEFT leg has positive straight leg raise on exam and the right leg has a negative straight leg exam     Encounter Diagnoses     ICD-10-CM ICD-9-CM   1. Low back pain with left-sided sciatica, unspecified back pain laterality, unspecified chronicity  M54.42 724.3       HPI:  The patient is here with a chief complaint of left hip and ankle pain, sharp, throbbing pain, progressively getting worse. Pain is 10/10. ROS:  10-point review of systems is positive for instability, locking, and stiffness. X-rays of the left hip and CT scan of the spine are unremarkable. Assessment/Plan:  Plan at this point would be for MRI of the lumbar spine. We will see the patient post-MRI and go from there. Return to Office: Follow-up and Dispositions    · Return for POST MRI. Scribed by Candida Peralta as dictated by Kike Huston. Kelechi Bartholomew MD.  Documentation True and Accepted Raj JAILYN Bartholomew MD

## 2020-12-30 NOTE — PATIENT INSTRUCTIONS
Back Pain: Care Instructions  Your Care Instructions     Back pain has many possible causes. It is often related to problems with muscles and ligaments of the back. It may also be related to problems with the nerves, discs, or bones of the back. Moving, lifting, standing, sitting, or sleeping in an awkward way can strain the back. Sometimes you don't notice the injury until later. Arthritis is another common cause of back pain. Although it may hurt a lot, back pain usually improves on its own within several weeks. Most people recover in 12 weeks or less. Using good home treatment and being careful not to stress your back can help you feel better sooner. Follow-up care is a key part of your treatment and safety. Be sure to make and go to all appointments, and call your doctor if you are having problems. It's also a good idea to know your test results and keep a list of the medicines you take. How can you care for yourself at home? · Sit or lie in positions that are most comfortable and reduce your pain. Try one of these positions when you lie down:  ? Lie on your back with your knees bent and supported by large pillows. ? Lie on the floor with your legs on the seat of a sofa or chair. ? Lie on your side with your knees and hips bent and a pillow between your legs. ? Lie on your stomach if it does not make pain worse. · Do not sit up in bed, and avoid soft couches and twisted positions. Bed rest can help relieve pain at first, but it delays healing. Avoid bed rest after the first day of back pain. · Change positions every 30 minutes. If you must sit for long periods of time, take breaks from sitting. Get up and walk around, or lie in a comfortable position. · Try using a heating pad on a low or medium setting for 15 to 20 minutes every 2 or 3 hours. Try a warm shower in place of one session with the heating pad. · You can also try an ice pack for 10 to 15 minutes every 2 to 3 hours.  Put a thin cloth between the ice pack and your skin. · Take pain medicines exactly as directed. ? If the doctor gave you a prescription medicine for pain, take it as prescribed. ? If you are not taking a prescription pain medicine, ask your doctor if you can take an over-the-counter medicine. · Take short walks several times a day. You can start with 5 to 10 minutes, 3 or 4 times a day, and work up to longer walks. Walk on level surfaces and avoid hills and stairs until your back is better. · Return to work and other activities as soon as you can. Continued rest without activity is usually not good for your back. · To prevent future back pain, do exercises to stretch and strengthen your back and stomach. Learn how to use good posture, safe lifting techniques, and proper body mechanics. When should you call for help? Call your doctor now or seek immediate medical care if:    · You have new or worsening numbness in your legs.     · You have new or worsening weakness in your legs. (This could make it hard to stand up.)     · You lose control of your bladder or bowels. Watch closely for changes in your health, and be sure to contact your doctor if:    · You have a fever, lose weight, or don't feel well.     · You do not get better as expected. Where can you learn more? Go to http://www.[employer].com/  Enter I594 in the search box to learn more about \"Back Pain: Care Instructions. \"  Current as of: March 2, 2020               Content Version: 12.6  © 7428-5978 [employer]. Care instructions adapted under license by [employer] (which disclaims liability or warranty for this information). If you have questions about a medical condition or this instruction, always ask your healthcare professional. [place] [address] any warranty or liability for your use of this information.

## 2021-01-11 DIAGNOSIS — M54.50 LOW BACK PAIN, UNSPECIFIED BACK PAIN LATERALITY, UNSPECIFIED CHRONICITY, UNSPECIFIED WHETHER SCIATICA PRESENT: Primary | ICD-10-CM

## 2021-01-21 ENCOUNTER — HOSPITAL ENCOUNTER (OUTPATIENT)
Dept: PHYSICAL THERAPY | Age: 61
Discharge: HOME | End: 2021-01-21
Payer: COMMERCIAL

## 2021-01-21 PROCEDURE — 97161 PT EVAL LOW COMPLEX 20 MIN: CPT

## 2021-01-21 NOTE — PROGRESS NOTES
PT INITIAL EVALUATION NOTE 2-15    Patient Name: Katie Carlos  Date:2021  : 1960  [x]  Patient  Verified  Payor: Kat 22 / Plan: [id number] / Product Type: Managed Care Medicaid /    In time: 13:05  Out time:14:00  Total Treatment Time (min): 55  Visit #: 1     Treatment Area: Low back pain [M54.5]    SUBJECTIVE  Pain Level (0-10 scale): 8/10 (stabbing, throbbing); worst: 10/10  Any medication changes, allergies to medications, adverse drug reactions, diagnosis change, or new procedure performed?: [] No    [x] Yes (see summary sheet for update)  Subjective: Pt reports that she has had back pain for the past 20 years, but it has started to get progressively worse since her fall on 20. Pt reports that she stepped wrong into a hole when she fell and injured her back. Pt reports that the pain she has is greater on the L side of her lower back which radiates down to her L ankle. Pt reports that it feels like their is a \"knotted ball\" on the L side of her lower back. Pt denies any numbness and tingling. Pt reports that she is supposed to return to the doctor in about a month or so. PLOF: community ambulator; independent with ADLs but painful. Mechanism of Injury: fall on 20  Previous Treatment/Compliance: none  Radiographs: x-ray; ct scan results state: \"mild degenerative changes of the hips; degenerative changes of the sacroiliac joints and imaged portions of the   lumbar spine. \"  What increases symptoms: laying on L side, stair negotiation, prolonged standing, prolonged walking, bending forward, lifting  What decreases symptoms: laying on back on heating pad  PMHx/Surgical Hx: 1997 - DJD and sciatica; depression; arthritis; high blood pressure; latex allergy; tobacco use; asthma  Work Hx: /[employer] on [employer] - lifts boxes greater than 50 lbs  Living Situation: one story house, 8 steps, B railings  Pt Goals: \"to get some relief\"  Barriers: none  Motivation: good   Substance use: cigarettes 1 pack every 2.5 days; drink on occasion. Cognition: A & O x 4        OBJECTIVE/EXAMINATION  Posture:  Kyphotic - slightly rounded shoulders   Other Observations:  Normal iliac crest alignment  Gait and Functional Mobility: Pt ambulates with reduced gait speed and toe out gait. Palpation: TTP along L5-S1 spinous processes, lumbar paraspinal on L, L PSIS, and L upper buttock        Lumbar AROM:      Flexion             57 p! Extension            15 p! R   L  Side Bending   22 p!  25 p! Rotation   32 p!  43 p! Manual Muscle Testing R  L  Hip Flexion                4+/5                 3+/5 p! Hip Abduction (gross)  3+/5  3+/5  Hip Adduction (gross)  4/5  4/5  Knee Extension         5/5                   3+/5 p! Knee Flexion               5/5  3+/5 p! Ankle PF   5/5  5/5  Ankle DF   5/5  5/5    Flexibility: bilateral HS tightness      Special Tests: Slump: Positive - bilaterally  Neurological Sensation: LT sensation intact bilaterally in LEs    Modality rationale: decrease pain and increase tissue extensibility to improve the patients ability to ambulate with reduced lumbar pain.    Min Type Additional Details    [] Estim: []Att   []Unatt        []TENS instruct                  []IFC  []Premod   []NMES                     []Other:  []w/US   []w/ice   []w/heat  Position:  Location:    []  Traction: [] Cervical       []Lumbar                       [] Prone          []Supine                       []Intermittent   []Continuous Lbs:  [] before manual  [] after manual  []w/heat    []  Ultrasound: []Continuous   [] Pulsed at:                            []1MHz   []3MHz Location:  W/cm2:    []  Paraffin         Location:  []w/heat   10 []  Ice     [x]  Heat  []  Ice massage Position: Seated  Location: Over lumbar region    []  Laser  []  Other: Position:  Location:    []  Vasopneumatic Device Pressure:       [] lo [] med [] hi   Temperature:    [x] Skin assessment post-treatment:  [x]intact [x]redness- no adverse reaction    []redness  adverse reaction:          With   [] TE   [] TA   [] neuro   [x] other: Patient Education: [x] Review HEP    [] Progressed/Changed HEP based on:   [] positioning   [] body mechanics   [] transfers   [] heat/ice application    [x] other: Pt educated on benefits of heating pad, but to prevent laying in bed for extended periods of time.      Other Objective/Functional Measures: TU seconds    Pain Level (0-10 scale) post treatment: 8/10      ASSESSMENT:      [x]  See Plan of 71 Aimee Ybarra PT, DPT 2021

## 2021-01-21 NOTE — PROGRESS NOTES
[address]  [place], [address] [place]  Ph: [phone]    Fax: [phone]    Plan of Care/Statement of Necessity for Physical Therapy Services  2-15    Patient name: Rachid Goodman  : 1960  Provider#: [id number]  Referral source: Poly Jang MD      Medical/Treatment Diagnosis: Low back pain [M54.5]     Prior Hospitalization: see medical history     Comorbidities: see medical history  Prior Level of Function: community ambulator; independent with ADLs. Medications: Verified on Patient Summary List    Start of Care: 2021      Onset Date: 20       The Plan of Care and following information is based on the information from the initial evaluation. Assessment/ key information: Pt is a pleasant 61 y.o. female presenting to physical therapy with signs and symptoms consistent with lumbar radiculopathy. Pt demonstrates increased lumbar and L LE pain, decreased lumbar AROM, decreased LE strength, decreased gait quality and endurance, as well as decreased functional mobility. Pt would benefit from skilled physical therapy to improve pain with the use of modalities, improve ROM and LE strength with therapeutic exercises, improve gait quality and endurance with gait training, and improve functional mobility.      Evaluation Complexity History HIGH Complexity :3+ comorbidities / personal factors will impact the outcome/ POC ; Examination HIGH Complexity : 4+ Standardized tests and measures addressing body structure, function, activity limitation and / or participation in recreation  ;Presentation LOW Complexity : Stable, uncomplicated  ;Clinical Decision Making TUG Score: 13 seconds  Overall Complexity Rating: LOW     Problem List: pain affecting function, decrease ROM, decrease strength, impaired gait/ balance, decrease ADL/ functional abilitiies, decrease activity tolerance, decrease flexibility/ joint mobility and decrease transfer abilities   Treatment Plan may include any combination of the following: Therapeutic exercise, Therapeutic activities, Neuromuscular re-education, Physical agent/modality, Gait/balance training, Manual therapy, Patient education, Functional mobility training and Stair training  Patient / Family readiness to learn indicated by: asking questions and trying to perform skills  Persons(s) to be included in education: patient (P)  Barriers to Learning/Limitations: None  Patient Goal (s): \"to get some relief\"  Patient Self Reported Health Status: fair  Rehabilitation Potential: good    Short Term Goals: To be accomplished in 6 treatments:  1. Pt will demonstrates proper sitting posture to facilitate proper seated ergonomics. 2. Pt will demonstrate proper lifting technique to facilitate proper work ergonomics. 3. Pt will be able to stand for at least 15 minutes to facilitate work related duties as . Long Term Goals: To be accomplished in 12 treatments:  1. Pt will be able to stand for at least 30 minutes to facilitate work related duties as . 2. Pt will be able to increase LE strength bilaterally to 5/5 to facilitate performance of ADLs. 3. Pt will be able to lift 50 lbs with proper ergonomics to facilitate work related duties as mary. Frequency / Duration: Patient to be seen 2 times per week for 6 weeks. Patient/ Caregiver education and instruction: activity modification    [x]  Plan of care has been reviewed with JOS Dennison PT, DPT 2021     ________________________________________________________________________    I certify that the above Therapy Services are being furnished while the patient is under my care. I agree with the treatment plan and certify that this therapy is necessary.     Physician's Signature:____________________  Date:____________Time: _________    Patient name: Daniela Adams  : 1960  Provider#: [id number]

## 2021-01-27 ENCOUNTER — OFFICE VISIT (OUTPATIENT)
Dept: ORTHOPEDIC SURGERY | Age: 61
End: 2021-01-27
Payer: COMMERCIAL

## 2021-01-27 VITALS — BODY MASS INDEX: 30.36 KG/M2 | HEIGHT: 62 IN | WEIGHT: 165 LBS

## 2021-01-27 DIAGNOSIS — M54.50 LOW BACK PAIN, UNSPECIFIED BACK PAIN LATERALITY, UNSPECIFIED CHRONICITY, UNSPECIFIED WHETHER SCIATICA PRESENT: Primary | ICD-10-CM

## 2021-01-27 PROCEDURE — 99213 OFFICE O/P EST LOW 20 MIN: CPT | Performed by: ORTHOPAEDIC SURGERY

## 2021-01-27 NOTE — PATIENT INSTRUCTIONS
Back Pain: Care Instructions  Your Care Instructions     Back pain has many possible causes. It is often related to problems with muscles and ligaments of the back. It may also be related to problems with the nerves, discs, or bones of the back. Moving, lifting, standing, sitting, or sleeping in an awkward way can strain the back. Sometimes you don't notice the injury until later. Arthritis is another common cause of back pain. Although it may hurt a lot, back pain usually improves on its own within several weeks. Most people recover in 12 weeks or less. Using good home treatment and being careful not to stress your back can help you feel better sooner. Follow-up care is a key part of your treatment and safety. Be sure to make and go to all appointments, and call your doctor if you are having problems. It's also a good idea to know your test results and keep a list of the medicines you take. How can you care for yourself at home? · Sit or lie in positions that are most comfortable and reduce your pain. Try one of these positions when you lie down:  ? Lie on your back with your knees bent and supported by large pillows. ? Lie on the floor with your legs on the seat of a sofa or chair. ? Lie on your side with your knees and hips bent and a pillow between your legs. ? Lie on your stomach if it does not make pain worse. · Do not sit up in bed, and avoid soft couches and twisted positions. Bed rest can help relieve pain at first, but it delays healing. Avoid bed rest after the first day of back pain. · Change positions every 30 minutes. If you must sit for long periods of time, take breaks from sitting. Get up and walk around, or lie in a comfortable position. · Try using a heating pad on a low or medium setting for 15 to 20 minutes every 2 or 3 hours. Try a warm shower in place of one session with the heating pad. · You can also try an ice pack for 10 to 15 minutes every 2 to 3 hours.  Put a thin cloth between the ice pack and your skin. · Take pain medicines exactly as directed. ? If the doctor gave you a prescription medicine for pain, take it as prescribed. ? If you are not taking a prescription pain medicine, ask your doctor if you can take an over-the-counter medicine. · Take short walks several times a day. You can start with 5 to 10 minutes, 3 or 4 times a day, and work up to longer walks. Walk on level surfaces and avoid hills and stairs until your back is better. · Return to work and other activities as soon as you can. Continued rest without activity is usually not good for your back. · To prevent future back pain, do exercises to stretch and strengthen your back and stomach. Learn how to use good posture, safe lifting techniques, and proper body mechanics. When should you call for help? Call your doctor now or seek immediate medical care if:    · You have new or worsening numbness in your legs.     · You have new or worsening weakness in your legs. (This could make it hard to stand up.)     · You lose control of your bladder or bowels. Watch closely for changes in your health, and be sure to contact your doctor if:    · You have a fever, lose weight, or don't feel well.     · You do not get better as expected. Where can you learn more? Go to http://www.[employer].com/  Enter I594 in the search box to learn more about \"Back Pain: Care Instructions. \"  Current as of: March 2, 2020               Content Version: 12.6  © 7920-3056 [employer]. Care instructions adapted under license by [employer] (which disclaims liability or warranty for this information). If you have questions about a medical condition or this instruction, always ask your healthcare professional. [place] [address] any warranty or liability for your use of this information.

## 2021-01-27 NOTE — PROGRESS NOTES
Name: Elan Snow    : 1960     Service Dept: [address] [place] [address] and Sports Medicine    Patient's Pharmacies:    [facility] [address] [facility] [address] [place] [address], [address] [facility]  [address] [id number]  Phone: [phone] Fax: [phone]       Chief Complaint   Patient presents with    Hip Pain    Back Pain        Visit Vitals  Ht 5' 2\" (1.575 m)   Wt 165 lb (74.8 kg)   BMI 30.18 kg/m²      Allergies   Allergen Reactions    Latex Itching    Hismanal Anaphylaxis      Current Outpatient Medications   Medication Sig Dispense Refill    methylPREDNISolone (MEDROL DOSEPACK) 4 mg tablet Per dose pack instructions 1 Dose Pack 0    QUEtiapine (SEROquel) 50 mg tablet Take 1 Tab by mouth nightly for 90 days. 90 Tab 0    sertraline (ZOLOFT) 25 mg tablet Take 1 Tab by mouth daily for 90 days. Indications: anxiousness associated with depression 90 Tab 0    naproxen (NAPROSYN) 500 mg tablet Take 1 Tab by mouth two (2) times daily (with meals).  60 Tab 0    albuterol (PROVENTIL HFA, VENTOLIN HFA, PROAIR HFA) 90 mcg/actuation inhaler INHALE 2 PUFFS BY MOUTH EVERY 4 TO 6 HOURS AS NEEDED      aspirin delayed-release 81 mg tablet TAKE 1 TABLET BY MOUTH ONCE DAILY      lisinopril-hydroCHLOROthiazide (PRINZIDE, ZESTORETIC) 20-25 mg per tablet TAKE 1 TABLET BY MOUTH ONCE DAILY IN THE MORNING        Patient Active Problem List   Diagnosis Code    [facility], [facility] F31.9    Hypercholesteremia E78.00    Essential (primary) hypertension I10    Sciatica M54.30    Other chest pain R07.89    Low serum potassium E87.6      Family History   Problem Relation Age of Onset    Hypertension Mother     Diabetes Mother     No Known Problems Father       Social History     Socioeconomic History    Marital status:      Spouse name: Not on file    Number of children: Not on file    Years of education: Not on file    Highest education level: Not on file   Tobacco Use    Smoking status: Current Every Day Smoker     Packs/day: 1.00     Years: 21.00     Pack years: 21.00    Smokeless tobacco: Never Used   Substance and Sexual Activity    Alcohol use: Not Currently     Comment: occasionally    Drug use: Never    Sexual activity: Not Currently      Past Surgical History:   Procedure Laterality Date    HX HERNIA REPAIR      HX HYSTERECTOMY        Past Medical History:   Diagnosis Date    Asthma     B[facility] disorder, unspecified ([facility] 1/28/2019    Essential (primary) hypertension 1/28/2019    Hypercholesteremia 1/28/2019    Sciatica 1/28/2019        I have reviewed and agree with [facility] and ROS and intake form in chart and the record. Review of Systems:   Patient is a pleasant appearing individual, appropriately dressed, well hydrated, well nourished, who is alert, appropriately oriented for age, and in no acute distress with a normal gait and normal affect who does not appear to be in any significant pain. Physical Exam:  The patient's back is neurovascularly intact and appears to have good symmetry on exam with no muscle atrophy noted. Normal curvature of the spine with positive tenderness to palpation. Decreased range of motion in all planes secondary to pain but normal strength. No rashes, echymosis or other skin lesions noted. The patients bilateral lower extremities are grossly neurovascularly intact with good cap refill, full range of motion and strength. No swelling, echymosis or instability noted. Negative straight leg raise, negative chavez's and negative lachman's. No tenderness to palpation. No foot drop present and patient has a normal gait. Encounter Diagnoses     ICD-10-CM ICD-9-CM   1. Low back pain, unspecified back pain laterality, unspecified chronicity, unspecified whether sciatica present  M54.5 724.2       HPI:  The patient is here with low back pain, throbbing burning pain, progressively getting worse. Nothing has helped. Pain is 9/10. She was supposed to get an MRI, but she was denied that. Not tried physical therapy. She is trying physical therapy now. ROS:  10-point review of systems is positive for joint swelling and locking. X-rays are positive for mild arthritis. Assessment/Plan:  She was seeking pain medication. We told her we would not be able to give her any pain patches or pain medication. If she is not better in 4 weeks, she would benefit from an MRI. Otherwise, we will need to send her to a spine specialist.        Return to Office: Follow-up and Dispositions    · Return for PRN. Scribed by Liisa Snellen as dictated by Brii Rutledge. Gertrudis Adams MD.  Documentation True and Accepted Raj Adams MD

## 2021-02-03 ENCOUNTER — APPOINTMENT (OUTPATIENT)
Dept: PHYSICAL THERAPY | Age: 61
End: 2021-02-03
Payer: COMMERCIAL

## 2021-02-17 ENCOUNTER — HOSPITAL ENCOUNTER (OUTPATIENT)
Dept: PHYSICAL THERAPY | Age: 61
Discharge: HOME | End: 2021-02-17
Payer: COMMERCIAL

## 2021-02-17 PROCEDURE — 97014 ELECTRIC STIMULATION THERAPY: CPT

## 2021-02-17 PROCEDURE — 97110 THERAPEUTIC EXERCISES: CPT

## 2021-02-17 NOTE — PROGRESS NOTES
PT DAILY TREATMENT NOTE 2-15    Patient Name: Kayla Joy  Date:2021  : 1960  [x]  Patient  Verified  Payor: Kat 22 / Plan: [address] / Product Type: Managed Care Medicaid /    In time:214 pm  Out time:*252 pm  Total Treatment Time (min): 38  Visit #: * 2    Treatment Area: Low back pain [M54.5]    SUBJECTIVE  Pain Level (0-10 scale): *810  Any medication changes, allergies to medications, adverse drug reactions, diagnosis change, or new procedure performed?: [x] No    [] Yes (see summary sheet for update)  Subjective functional status/changes:   [] No changes reported  \"Pt reports having excessive pain in her L hip and \"sciatic\" area. \" Pt also notes that she got a SPC to make walking easier.      OBJECTIVE    Modality rationale: decrease pain, increase tissue extensibility and increase muscle contraction/control to improve the patients ability to increase LB movement and reduce use of AD   Min Type Additional Details      10 [x] Estim: []Att   [x]Unatt    []TENS instruct                  [x]IFC  []Premod   []NMES                     []Other:  []w/US   []w/ice   [x]w/heat  Position: seated   Location: L SIJ area       []  Traction: [] Cervical       []Lumbar                       [] Prone          []Supine                       []Intermittent   []Continuous Lbs:  [] before manual  [] after manual  []w/heat    []  Ultrasound: []Continuous   [] Pulsed                       at: []1MHz   []3MHz Location:  W/cm2:    [] Paraffin         Location:   []w/heat   10 []  Ice     [x]  Heat  []  Ice massage Position: seated    Location:across LB    []  Laser  []  Other: Position:  Location:      []  Vasopneumatic Device Pressure:       [] lo [] med [] hi   [] w/ ice    Temperature:      [x] Skin assessment post-treatment:  [x]intact []redness- no adverse reaction    []redness  adverse reaction:       28 min Therapeutic Exercise:  [x] See flow sheet :   Rationale: increase ROM, increase strength and improve coordination to improve the patients ability to increase functional motion and reduced pain in motion. With   [] TE   [] TA   [] neuro   [] other: Patient Education: [x] Review HEP    [] Progressed/Changed HEP based on:   [] positioning   [] body mechanics   [] transfers   [] heat/ice application    [] other:         Pain Level (0-10 scale) post treatment: 5/10    ASSESSMENT/Changes in Function:   Pt tolerated session with focus on HEP to get more mobility with inconsistency with in office visit  Patient will continue to benefit from skilled PT services to modify and progress therapeutic interventions, address functional mobility deficits, address ROM deficits, address strength deficits, analyze and address soft tissue restrictions, analyze and cue movement patterns and analyze and modify body mechanics/ergonomics to attain remaining goals. [x]  See Plan of Care  []  See progress note/recertification  []  See Discharge Summary         Progress towards goals / Updated goals:  Short Term Goals: To be accomplished in 6 treatments:  1. Pt will demonstrates proper sitting posture to facilitate proper seated ergonomics. 2. Pt will demonstrate proper lifting technique to facilitate proper work ergonomics. 3. Pt will be able to stand for at least 15 minutes to facilitate work related duties as .      Long Term Goals: To be accomplished in 12 treatments:  1. Pt will be able to stand for at least 30 minutes to facilitate work related duties as . 2. Pt will be able to increase LE strength bilaterally to 5/5 to facilitate performance of ADLs. 3. 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

## 2021-02-22 ENCOUNTER — HOSPITAL ENCOUNTER (OUTPATIENT)
Dept: PHYSICAL THERAPY | Age: 61
Discharge: HOME | End: 2021-02-22
Payer: COMMERCIAL

## 2021-02-22 PROCEDURE — 97110 THERAPEUTIC EXERCISES: CPT

## 2021-02-22 NOTE — PROGRESS NOTES
PT DAILY TREATMENT NOTE 2-15    Patient Name: Latasha Mullen  Date:2021  : 1960  [x]  Patient  Verified  Payor: Kat 22 / Plan: [address] / Product Type: Managed Care Medicaid /    In time: 8982  Out time: 145  Total Treatment Time (min): 40  Visit #:  3    Treatment Area: Low back pain [M54.5]    SUBJECTIVE  Pain Level (0-10 scale): 7.5/10  Any medication changes, allergies to medications, adverse drug reactions, diagnosis change, or new procedure performed?: [x] No    [] Yes (see summary sheet for update)  Subjective functional status/changes:   [] No changes reported  \"The leg still locks and folds. \" \"The last day I had physical therapy, it really helped. \" \"I have been rubbing a cream on me that they use on horses. It seems to be helping. \"    OBJECTIVE  Posture:  Kyphotic - slightly rounded shoulders   Other Observations:  Normal iliac crest alignment  Gait and Functional Mobility: Pt ambulates with reduced gait speed and toe out gait. Palpation: TTP along L5-S1 spinous processes, lumbar paraspinal on L, L PSIS, and L upper buttock                                                     Lumbar AROM:                                               Flexion                                              73 p! Extension                                         20 p! R                       L  Side Bending                           10 p!                15 p! Rotation                                   32 p!                43 p!                     Manual Muscle Testing        R                      L  Hip Flexion                              5/5                 3+/5 p! Hip Abduction (gross)             3+/5                 3+/5  Hip Adduction (gross)             4/5                   4/5  Knee Extension                       5/5                   4+/5 p!   Knee Flexion                           5/5                   4+/5 p! Ankle PF                                 5/5                   5/5  Ankle DF                                 5/5                   5/5     Flexibility: bilateral HS tightness                                             Special Tests: Slump: Positive - bilaterally  Neurological Sensation: LT sensation intact bilaterally in LEs    40 min Therapeutic Exercise:  [x] See flow sheet :   Rationale: increase ROM, increase strength and improve coordination to improve the patients ability to increase functional motion and reduced pain in motion. With   [x] TE   [] TA   [] neuro   [] other: Patient Education: [x] Review HEP    [] Progressed/Changed HEP based on:   [] positioning   [] body mechanics   [] transfers   [] heat/ice application    [] other:         Pain Level (0-10 scale) post treatment: 7.5/10    ASSESSMENT/Changes in Function:   Pt has received treatment including lumbopelvic and lower extremity flexibility, ROM, strengthening, pelvic and core stabilization, functional activities, and education in posture and body mechanics. Pt has only been to 1 follow up treatment session since ie, therefore limited progress has been made. DPT does note improvement in general arom, however continues with deficits including pain and limitations limiting work and functional activities. Pt would benefit from further skilled physical therapy interventions to continue to progress range of motion, strength, and balance, allowing for improved function. Limited treatment session due to pt being on work lunch break.     Patient will continue to benefit from skilled PT services to modify and progress therapeutic interventions, address functional mobility deficits, address ROM deficits, address strength deficits, analyze and address soft tissue restrictions, analyze and cue movement patterns and analyze and modify body mechanics/ergonomics to attain remaining goals. [x]  See Plan of Care  [x]  See progress note/recertification  []  See Discharge Summary         Progress towards goals / Updated goals:  Short Term Goals: To be accomplished in 6 treatments:  1. Pt will demonstrates proper sitting posture to facilitate proper seated ergonomics. -Partially met  2. Pt will demonstrate proper lifting technique to facilitate proper work ergonomics. -Partially met  3. Pt will be able to stand for at least 15 minutes to facilitate work related duties as . Met     Long Term Goals: To be accomplished in 12 treatments:  1. Pt will be able to stand for at least 30 minutes to facilitate work related duties as . Met  2. Pt will be able to increase LE strength bilaterally to 5/5 to facilitate performance of ADLs. -not met  3. 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 Pt will be able to stand for full 6 hour shift without pain greater than or equal to 6/10 pain.   -(Added 2/22/21)      PLAN  [x]  Upgrade activities as tolerated     [x]  Continue plan of care  []  Update interventions per flow sheet       []  Discharge due to:_  []  Other:_      Truman Cates, PT, DPT 2/22/2021

## 2021-02-22 NOTE — PROGRESS NOTES
[address]  [place]  Ph: [phone]    Fax: [phone]    Progress Note    Name: Alexander Knight   : 1960   MD: Radha Swain MD       Treatment Diagnosis: Low back pain [M54.5]  Start of Care: 2021    Visits from Start of Care: 3   Missed Visits: 0    Summary of Care / Assessment / Recommendations:   Pt has received treatment including lumbopelvic and lower extremity flexibility, ROM, strengthening, pelvic and core stabilization, functional activities, and education in posture and body mechanics. Pt has only been to 1 follow up treatment session since ie, therefore limited progress has been made. DPT does note improvement in general arom, however continues with deficits including pain and limitations limiting work and functional activities. Pt would benefit from further skilled physical therapy interventions to continue to progress range of motion, strength, and balance, allowing for improved function. Patient will continue to benefit from skilled PT services to modify and progress therapeutic interventions, address functional mobility deficits, address ROM deficits, address strength deficits, analyze and address soft tissue restrictions, analyze and cue movement patterns and analyze and modify body mechanics/ergonomics to attain remaining goals. Progress towards goals / Updated goals:  Short Term Goals: To be accomplished in 6 treatments:  1. Pt will demonstrates proper sitting posture to facilitate proper seated ergonomics. -Partially met  2. Pt will demonstrate proper lifting technique to facilitate proper work ergonomics. -Partially met  3. Pt will be able to stand for at least 15 minutes to facilitate work related duties as . Met     Long Term Goals: To be accomplished in 12 treatments:  1. Pt will be able to stand for at least 30 minutes to facilitate work related duties as . Met  2.  Pt will be able to increase LE strength bilaterally to 5/5 to facilitate performance of ADLs. -not met  3. 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 Pt will be able to stand for full 6 hour shift without pain greater than or equal to 6/10 pain. -(Added 4/88/16)    Recertification Period: 2/11/21 to 5/19/2021    Frequency/Duration:  2 treatments per week, for 5 weeks. Chikis Jiang, PT, DPT  2/22/2021     ________________________________________________________________________  NOTE TO PHYSICIAN:  Please complete the following and fax to:  [facility]:   Fax: [phone]  . Retain this original for your records. If you are unable to process this request in 24 hours, please contact our office.        ____ I have read the above report and request that my patient continue therapy with the following changes/special instructions:  ____ I have read the above report and request that my patient be discharged from therapy    Physician's Signature:_________________ Date:___________Time:__________

## 2021-03-03 ENCOUNTER — APPOINTMENT (OUTPATIENT)
Dept: PHYSICAL THERAPY | Age: 61
End: 2021-03-03
Payer: COMMERCIAL

## 2021-03-04 ENCOUNTER — HOSPITAL ENCOUNTER (OUTPATIENT)
Dept: PHYSICAL THERAPY | Age: 61
Discharge: HOME | End: 2021-03-04
Payer: COMMERCIAL

## 2021-03-04 PROCEDURE — 97014 ELECTRIC STIMULATION THERAPY: CPT

## 2021-03-04 PROCEDURE — 97110 THERAPEUTIC EXERCISES: CPT

## 2021-03-04 NOTE — PROGRESS NOTES
PT DAILY TREATMENT NOTE 2-15    Patient Name: Lluvia Tirado  Date:3/4/2021  : 1960  [x]  Patient  Verified  Payor: [employer] / Plan: [id number] / Product Type: Managed Care Medicaid /    In time: 417  Out time: 1400  Total Treatment Time (min): 60  Visit #:  4    Treatment Area: Low back pain [M54.5]    SUBJECTIVE  Pain Level (0-10 scale): 0/10  Any medication changes, allergies to medications, adverse drug reactions, diagnosis change, or new procedure performed?: [x] No    [] Yes (see summary sheet for update)  Subjective functional status/changes:   [] No changes reported  \"I don't have to go to work today, so I can stay the whole time. \"    OBJECTIVE    45 min Therapeutic Exercise:  [x] See flow sheet :   Rationale: increase ROM, increase strength and improve coordination to improve the patients ability to increase functional motion and reduced pain in motion. With   [x] TE   [] TA   [] neuro   [] other: Patient Education: [x] Review HEP    [] Progressed/Changed HEP based on:   [] positioning   [] body mechanics   [] transfers   [] heat/ice application    [] other:      Modality rationale: decrease edema, decrease inflammation, decrease pain and increase tissue extensibility to improve the patients ability to complete ADL's without pain.     Min Type Additional Details      15 [x] Estim: []Att   [x]Unatt    []TENS instruct                  [x]IFC  []Premod   []NMES                     []Other:  []w/US   []w/ice   [x]w/heat  Position: seated  Location: lower back       []  Traction: [] Cervical       []Lumbar                       [] Prone          []Supine                       []Intermittent   []Continuous Lbs:  [] before manual  [] after manual  []w/heat    []  Ultrasound: []Continuous   [] Pulsed                       at: []1MHz   []3MHz Location:  W/cm2:    [] Paraffin         Location:   []w/heat    []  Ice     []  Heat  []  Ice massage Position:  Location:    []  Laser  []  Other: Position:  Location:      []  Vasopneumatic Device Pressure:       [] lo [] med [] hi   Temperature:      [x] Skin assessment post-treatment:  [x]intact [x]redness- no adverse reaction    []redness - adverse reaction:        Pain Level (0-10 scale) post treatment: 7.5/10    ASSESSMENT/Changes in Function:   Pt tolerated treatment well today. Pt with limitations on figure 4 supine stretches due to increased pain. May benefit from less of a hold with supine exercises to increase tolerance. Otherwise required minimal cues to complete with correct technique. Patient will continue to benefit from skilled PT services to modify and progress therapeutic interventions, address functional mobility deficits, address ROM deficits, address strength deficits, analyze and address soft tissue restrictions, analyze and cue movement patterns and analyze and modify body mechanics/ergonomics to attain remaining goals. [x]  See Plan of Care  [x]  See progress note/recertification  []  See Discharge Summary         Progress towards goals / Updated goals:  Short Term Goals: To be accomplished in 6 treatments:  1. Pt will demonstrates proper sitting posture to facilitate proper seated ergonomics. -Partially met  2. Pt will demonstrate proper lifting technique to facilitate proper work ergonomics. -Partially met  3. Pt will be able to stand for at least 15 minutes to facilitate work related duties as . Met     Long Term Goals: To be accomplished in 12 treatments:  1. Pt will be able to stand for at least 30 minutes to facilitate work related duties as . Met  2. Pt will be able to increase LE strength bilaterally to 5/5 to facilitate performance of ADLs. -not met  3. 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 Pt will be able to stand for full 6 hour shift without pain greater than or equal to 6/10 pain. -(Added 2/22/21)      PLAN  [x]  Upgrade activities as tolerated     [x]  Continue plan of care  []  Update interventions per flow sheet       []  Discharge due to:_  []  Other:_      Davy Pink, PT, DPT 3/4/2021

## 2021-03-25 ENCOUNTER — APPOINTMENT (OUTPATIENT)
Dept: PHYSICAL THERAPY | Age: 61
End: 2021-03-25
Payer: COMMERCIAL

## 2021-04-20 DIAGNOSIS — F31.60 MIXED BIPOLAR DISORDER (HCC): ICD-10-CM

## 2021-04-20 DIAGNOSIS — F33.1 MODERATE EPISODE OF RECURRENT MAJOR DEPRESSIVE DISORDER (HCC): ICD-10-CM

## 2021-04-20 RX ORDER — SERTRALINE HYDROCHLORIDE 25 MG/1
TABLET, FILM COATED ORAL
Qty: 90 TAB | Refills: 0 | Status: SHIPPED | OUTPATIENT
Start: 2021-04-20 | End: 2021-08-23

## 2021-04-20 RX ORDER — QUETIAPINE FUMARATE 50 MG/1
TABLET, FILM COATED ORAL
Qty: 90 TAB | Refills: 0 | Status: SHIPPED | OUTPATIENT
Start: 2021-04-20 | End: 2021-11-12 | Stop reason: SDUPTHER

## 2021-04-21 NOTE — PROGRESS NOTES
[address] [address]  Ph: [phone]     Fax: [phone]    Discharge Summary 2-15    Patient name: Radha Guajardo  : 1960  Provider#: [id number]  Referral source: Adolfo Spnece MD      Medical/Treatment Diagnosis: Low back pain [M54.5]     Prior Hospitalization: see medical history     Comorbidities: See Plan of Care  Prior Level of Function: See Plan of Care  Medications: Verified on Patient Summary List    Start of Care: 21      Onset Date: 20   Visits from Start of Care: 4     Missed Visits: 6  Reporting Period : 21 to 21    Assessment/Summary of care: Patient has not returned for treatment since 3/4/21. Patient was called secondary to absence. Pt reported she would not be returning to physical therapy at this time. Patient educated to return to Physician for new prescription if the problem persists so she can return to physical therapy. Patient is now formally discharged from physical therapy due to lack of attendance. Goals have not been formally assessed due to patient's discontinuance. Progress Toward Goals:  Short Term Goals: To be accomplished in 6 treatments:  1. Pt will demonstrates proper sitting posture to facilitate proper seated ergonomics. -Partially met  2. Pt will demonstrate proper lifting technique to facilitate proper work ergonomics. -Partially met  3. Pt will be able to stand for at least 15 minutes to facilitate work related duties as .  [address] be accomplished in 12 treatments:  1. Pt will be able to stand for at least 30 minutes to facilitate work related duties as . Met  2. Pt will be able to increase LE strength bilaterally to 5/5 to facilitate performance of ADLs. -not met  3. 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  Pt will be able to stand for full 6 hour shift without pain greater than or equal to 6/10 pain.   -(Added 2/22/21)        RECOMMENDATIONS:  [x]Discontinue therapy: []Patient has reached or is progressing toward set goals     [x]Patient is non-compliant or has abdicated     []Due to lack of appreciable progress towards set goals     []Other  Aliyah Mueller, PT, DPT 4/21/2021

## 2021-05-14 RX ORDER — LISINOPRIL AND HYDROCHLOROTHIAZIDE 20; 25 MG/1; MG/1
TABLET ORAL
Qty: 30 TAB | Refills: 0 | Status: SHIPPED | OUTPATIENT
Start: 2021-05-14 | End: 2021-07-05

## 2021-06-14 ENCOUNTER — OFFICE VISIT (OUTPATIENT)
Dept: PRIMARY CARE CLINIC | Age: 61
End: 2021-06-14
Payer: COMMERCIAL

## 2021-06-14 DIAGNOSIS — K46.9 ABDOMINAL HERNIA WITHOUT OBSTRUCTION AND WITHOUT GANGRENE, RECURRENCE NOT SPECIFIED, UNSPECIFIED HERNIA TYPE: ICD-10-CM

## 2021-06-14 DIAGNOSIS — I10 ESSENTIAL (PRIMARY) HYPERTENSION: ICD-10-CM

## 2021-06-14 DIAGNOSIS — J44.9 CHRONIC OBSTRUCTIVE PULMONARY DISEASE, UNSPECIFIED COPD TYPE (HCC): Primary | ICD-10-CM

## 2021-06-14 DIAGNOSIS — F31.60 MIXED BIPOLAR DISORDER (HCC): ICD-10-CM

## 2021-06-14 DIAGNOSIS — Z12.31 SCREENING MAMMOGRAM, ENCOUNTER FOR: ICD-10-CM

## 2021-06-14 DIAGNOSIS — F17.200 TOBACCO DEPENDENCE: ICD-10-CM

## 2021-06-14 DIAGNOSIS — K21.9 GASTROESOPHAGEAL REFLUX DISEASE WITHOUT ESOPHAGITIS: ICD-10-CM

## 2021-06-14 DIAGNOSIS — R06.83 HABITUAL SNORING: ICD-10-CM

## 2021-06-14 PROCEDURE — 99214 OFFICE O/P EST MOD 30 MIN: CPT | Performed by: NURSE PRACTITIONER

## 2021-06-14 RX ORDER — ISOSORBIDE MONONITRATE 30 MG/1
30 TABLET, EXTENDED RELEASE ORAL DAILY
COMMUNITY

## 2021-06-14 RX ORDER — OMEPRAZOLE 40 MG/1
40 CAPSULE, DELAYED RELEASE ORAL DAILY
Qty: 30 CAPSULE | Refills: 1 | Status: SHIPPED | OUTPATIENT
Start: 2021-06-14 | End: 2021-09-22

## 2021-06-14 NOTE — PROGRESS NOTES
Chief Complaint   Patient presents with    Cough    Sleep Problem    Wheezing     Only happens at night when sleeping per pt and her boyfriend

## 2021-06-14 NOTE — PROGRESS NOTES
Sherron Cr is a 61 y.o. female who presents to the office today for the following:    Chief Complaint   Patient presents with    Cough    Sleep Problem    Wheezing       Past Medical History:   Diagnosis Date    Asthma     Bipolar disorder, unspecified ([address] 1/28/2019    Essential (primary) hypertension 1/28/2019    Hypercholesteremia 1/28/2019    Sciatica 1/28/2019       Past Surgical History:   Procedure Laterality Date    HX HERNIA REPAIR      HX HYSTERECTOMY          Family History   Problem Relation Age of Onset    Hypertension Mother     Diabetes Mother     No Known Problems Father         Social History     Tobacco Use    Smoking status: Current Every Day Smoker     Packs/day: 1.00     Years: 21.00     Pack years: 21.00    Smokeless tobacco: Never Used   Vaping Use    Vaping Use: Never used   Substance Use Topics    Alcohol use: Not Currently     Comment: occasionally    Drug use: Never        HPI  Patient here today for follow up with PMH of copd, GERD, bipolar disorder, hypertension and tobacco dependence. States that she now has a partner and was told by them that she is gasping during her sleep and that she snores. Has woken herself up snoring before and  frequently wake up coughing. Feels tired throughout the day. Also has been having increased reflux over the past 1 month. Was on Nexium in the past but ran out. No vomiting but has had some nausea due to acid coming up. Lastly, wants to discuss concerns about hernia. States that she had mesh placed with hernia repair and was told this needed to be removed in 5 years. Has some tenderness over her mid-abdomen but no pain if not touching. Wants to see a surgeon about this as she now has insurance. Current Outpatient Medications on File Prior to Visit   Medication Sig    isosorbide mononitrate ER (IMDUR) 30 mg tablet Take 1 Tablet by mouth daily.     lisinopril-hydroCHLOROthiazide (PRINZIDE, ZESTORETIC) 20-25 mg per tablet TAKE 1 TABLET BY MOUTH ONCE DAILY IN THE MORNING    QUEtiapine (SEROquel) 50 mg tablet TAKE 1 TABLET BY MOUTH NIGHTLY FOR 90 DAYS    sertraline (ZOLOFT) 25 mg tablet TAKE 1 TABLET BY MOUTH ONCE DAILY FOR  ANXIOUSNESS  ASSOCIATED  WITH  DEPRESSION    naproxen (NAPROSYN) 500 mg tablet Take 1 Tab by mouth two (2) times daily (with meals).  albuterol (PROVENTIL HFA, VENTOLIN HFA, PROAIR HFA) 90 mcg/actuation inhaler INHALE 2 PUFFS BY MOUTH EVERY 4 TO 6 HOURS AS NEEDED     No current facility-administered medications on file prior to visit. Medications Ordered Today   Medications    omeprazole (PRILOSEC) 40 mg capsule     Sig: Take 1 Capsule by mouth daily. Dispense:  30 Capsule     Refill:  1        Review of Systems   Constitutional: Negative. HENT: Negative. Eyes: Negative. Respiratory: Positive for cough, sputum production, shortness of breath and wheezing. Cardiovascular: Negative. Gastrointestinal: Positive for heartburn. Negative for abdominal pain, blood in stool, constipation, diarrhea, melena, nausea and vomiting. Genitourinary: Negative. Musculoskeletal: Negative. Skin: Negative. Neurological: Negative. Visit Vitals  /82   Pulse 72   Temp 99.2 °F (37.3 °C) (Oral)   Resp 18   Wt 155 lb (70.3 kg)   SpO2 98%   BMI 28.35 kg/m²       Physical Exam  Vitals and nursing note reviewed. Constitutional:       Appearance: Normal appearance. Eyes:      Pupils: Pupils are equal, round, and reactive to light. Neck:      Vascular: No carotid bruit. Cardiovascular:      Rate and Rhythm: Normal rate and regular rhythm. Pulses: Normal pulses. Heart sounds: Normal heart sounds. Pulmonary:      Effort: Pulmonary effort is normal.      Breath sounds: Normal breath sounds. Abdominal:      General: Bowel sounds are normal.      Palpations: Abdomen is soft. Tenderness: There is abdominal tenderness (mid-abdomen). There is no guarding or rebound. Musculoskeletal:         General: Normal range of motion. Right lower leg: No edema. Left lower leg: No edema. Lymphadenopathy:      Cervical: No cervical adenopathy. Skin:     General: Skin is warm and dry. Neurological:      Mental Status: She is alert and oriented to person, place, and time. Mental status is at baseline. Psychiatric:         Mood and Affect: Mood normal.         Behavior: Behavior normal.              1. Chronic obstructive pulmonary disease, unspecified COPD type ([facility]  She reports intermittent use of albuterol inhaler but not > twice weekly    2. Gastroesophageal reflux disease without esophagitis  Reports recent increased reflux symptoms but not on PPI therapy any longer as she ran out  Will re-start on prilosec 40mg daily as directed x 30 days  Avoid all NSAIDS  Avoid foods that worsen symptoms, elevate HOB prn, eat smaller, more freuquent meals, avoid laying flat 1 hour after meal  If not improving over next 2-3 weeks, will consider referral to GI for further eval and treatment  - omeprazole (PRILOSEC) 40 mg capsule; Take 1 Capsule by mouth daily. Dispense: 30 Capsule; Refill: 1    3. Habitual snoring  She reports habitual snoring, waking up gasping and daytime fatigue  Will refer to pulmonary for sleep study eval and treatment    4. Mixed bipolar disorder ([facility]  Symptoms have been stable and is followed by psychiatry    5. Essential (primary) hypertension  Blood pressure is controlled and continue medication as directed  Check readings at home and notify provider if > 140/90  Check fasting labs (she will RTO for lab visit)  - CBC WITH AUTOMATED DIFF  - METABOLIC PANEL, COMPREHENSIVE  - URINALYSIS W/ RFLX MICROSCOPIC  - LIPID PANEL    6. Tobacco dependence  Continue to encourage smoking cessation     7.  Abdominal hernia without obstruction and without gangrene, recurrence not specified, unspecified hernia type  Reports tenderness when palpating on mid-abdomen but no constant pain  Was told mesh needed to be \"removed\" in 5 years  Would like to see surgeon to evaluate further  - REFERRAL TO GENERAL SURGERY    8. Screening mammogram, encounter for  Send for mammogram   - [facility] BI SCREENING INCL CAD; Future      Patient verbalizes understanding of plan of care as discussed above    Follow-up and Dispositions    · Return in about 3 months (around 9/14/2021) for or sooner for worsening symptoms.

## 2021-06-15 VITALS
WEIGHT: 155 LBS | RESPIRATION RATE: 18 BRPM | SYSTOLIC BLOOD PRESSURE: 130 MMHG | OXYGEN SATURATION: 98 % | TEMPERATURE: 99.2 F | DIASTOLIC BLOOD PRESSURE: 82 MMHG | HEART RATE: 72 BPM | BODY MASS INDEX: 28.35 KG/M2

## 2021-06-30 ENCOUNTER — OFFICE VISIT (OUTPATIENT)
Dept: SURGERY | Age: 61
End: 2021-06-30
Payer: COMMERCIAL

## 2021-06-30 VITALS
BODY MASS INDEX: 26.75 KG/M2 | HEART RATE: 76 BPM | OXYGEN SATURATION: 95 % | RESPIRATION RATE: 20 BRPM | DIASTOLIC BLOOD PRESSURE: 80 MMHG | SYSTOLIC BLOOD PRESSURE: 132 MMHG | TEMPERATURE: 98.2 F | WEIGHT: 151 LBS | HEIGHT: 63 IN

## 2021-06-30 DIAGNOSIS — K43.2 RECURRENT VENTRAL HERNIA: Primary | ICD-10-CM

## 2021-06-30 PROCEDURE — 99204 OFFICE O/P NEW MOD 45 MIN: CPT | Performed by: COLON & RECTAL SURGERY

## 2021-06-30 NOTE — PROGRESS NOTES
Visit Vitals  /80 (BP 1 Location: Right upper arm, BP Patient Position: Sitting, BP Cuff Size: Adult) Comment: amos   Pulse 76   Temp 98.2 °F (36.8 °C) (Temporal)   Resp 20   Ht 5' 2.75\" (1.594 m)   Wt 151 lb (68.5 kg)   SpO2 95%   BMI 26.96 kg/m²     Chief Complaint   Patient presents with    New Patient     hernia under naval area-feels like coils have come loose from mesh   1. Have you been to the ER, urgent care clinic since your last visit? Hospitalized since your last visit? No    2. Have you seen or consulted any other health care providers outside of the [address] [place] [address] since your last visit? Include any pap smears or colon screening.  No

## 2021-06-30 NOTE — PROGRESS NOTES
OFFICE VISIT NOTE    Sal Kennedy is a 61 y.o. female who presents to the office today for:    Chief Complaint   Patient presents with    New Patient     hernia under naval area-feels like coils have come loose from mesh       The patient is a 80-year-old female who comes in today for evaluation of possible recurrent ventral hernia. She apparently has had three ventral abdominal hernia repairs in the past in the lower abdomen. She states she feels discomfort and a bulge at the umbilicus. In addition in the lower abdomen she feels like the mesh has become displaced. She is also had a hiatal hernia repair. She denies any nausea vomiting or obstructive symptoms. She is tolerating a diet and moving her bowels. Her past medical history seen for history of hypertension, bipolar disorder, hypercholesterolemia, sciatica. In addition to her hernia repairs her surgical history is significant history of hysterectomy.         Past Medical History:   Diagnosis Date    Asthma     Bipolar disorder, unspecified ([facility] 1/28/2019    Essential (primary) hypertension 1/28/2019    Hypercholesteremia 1/28/2019    Sciatica 1/28/2019       Past Surgical History:   Procedure Laterality Date    HX HERNIA REPAIR      HX HYSTERECTOMY         Family History   Problem Relation Age of Onset    Hypertension Mother     Diabetes Mother     No Known Problems Father        Social History     Socioeconomic History    Marital status:      Spouse name: Not on file    Number of children: Not on file    Years of education: Not on file    Highest education level: Not on file   Occupational History    Not on file   Tobacco Use    Smoking status: Current Every Day Smoker     Packs/day: 1.00     Years: 21.00     Pack years: 21.00    Smokeless tobacco: Never Used   Vaping Use    Vaping Use: Never used   Substance and Sexual Activity    Alcohol use: Not Currently     Comment: occasionally    Drug use: Never    Sexual activity: Not Currently   Other Topics Concern    Not on file   Social History Narrative    Not on file     Social Determinants of Health     Financial Resource Strain:     Difficulty of Paying Living Expenses:    Food Insecurity:     Worried About Running Out of Food in the Last Year:     [address] in the Last Year:    Transportation Needs:     Lack of Transportation (Medical):  Lack of Transportation (Non-Medical):    Physical Activity:     Days of Exercise per Week:     Minutes of Exercise per Session:    Stress:     Feeling of Stress :    Social Connections:     Frequency of Communication with Friends and Family:     Frequency of Social Gatherings with Friends and Family:     Attends [employer] Services:     Active Member of Clubs or Organizations:     Attends Club or Organization Meetings:     Marital Status:    Intimate Partner Violence:     Fear of Current or Ex-Partner:     Emotionally Abused:     Physically Abused:     Sexually Abused: Allergies   Allergen Reactions    Latex Itching    Hismanal Anaphylaxis       Current Outpatient Medications   Medication Sig    isosorbide mononitrate ER (IMDUR) 30 mg tablet Take 1 Tablet by mouth daily.  omeprazole (PRILOSEC) 40 mg capsule Take 1 Capsule by mouth daily.     lisinopril-hydroCHLOROthiazide (PRINZIDE, ZESTORETIC) 20-25 mg per tablet TAKE 1 TABLET BY MOUTH ONCE DAILY IN THE MORNING    QUEtiapine (SEROquel) 50 mg tablet TAKE 1 TABLET BY MOUTH NIGHTLY FOR 90 DAYS    sertraline (ZOLOFT) 25 mg tablet TAKE 1 TABLET BY MOUTH ONCE DAILY FOR  ANXIOUSNESS  ASSOCIATED  WITH  DEPRESSION    albuterol (PROVENTIL HFA, VENTOLIN HFA, PROAIR HFA) 90 mcg/actuation inhaler INHALE 2 PUFFS BY MOUTH EVERY 4 TO 6 HOURS AS NEEDED    naproxen (NAPROSYN) 500 mg tablet Take 1 Tab by mouth two (2) times daily (with meals). (Patient not taking: Reported on 6/30/2021)     No current facility-administered medications for this visit. Review of Systems   Constitutional: Positive for malaise/fatigue. HENT: Positive for sinus pain and tinnitus. Eyes: Positive for blurred vision. Respiratory: Positive for cough, shortness of breath and wheezing. Cardiovascular: Positive for chest pain and leg swelling. Gastrointestinal: Positive for abdominal pain. Genitourinary: Positive for frequency. Musculoskeletal: Positive for back pain, joint pain and myalgias. Skin: Positive for rash. Neurological: Positive for tingling. Psychiatric/Behavioral: Positive for depression and memory loss. The patient is nervous/anxious and has insomnia. /80 (BP 1 Location: Right upper arm, BP Patient Position: Sitting, BP Cuff Size: Adult) Comment: amos  Pulse 76   Temp 98.2 °F (36.8 °C) (Temporal)   Resp 20   Ht 5' 2.75\" (1.594 m)   Wt 151 lb (68.5 kg)   SpO2 95%   BMI 26.96 kg/m²     Physical Exam  Constitutional:       General: She is not in acute distress. Appearance: Normal appearance. She is not ill-appearing. HENT:      Head: Normocephalic and atraumatic. Cardiovascular:      Rate and Rhythm: Normal rate and regular rhythm. Heart sounds: Normal heart sounds. Pulmonary:      Effort: Pulmonary effort is normal.      Breath sounds: Normal breath sounds. Abdominal:          Comments: Hernia at the umbilicus, tender palpation, reducible  Mesh and/or sutures palpable just below the skin at the lower midline incision, very tender to palpation, possible recurrence to the side of the mesh   Musculoskeletal:      Cervical back: Neck supple. Skin:     General: Skin is warm and dry. Neurological:      General: No focal deficit present. Mental Status: She is alert and oriented to person, place, and time. Psychiatric:         Mood and Affect: Mood normal.         Thought Content:  Thought content normal.         Judgment: Judgment normal.         Problem List Items Addressed This Visit        Other    Recurrent ventral hernia - Primary          Assessment and Plan:    Told the patient given her complex surgical history that I would like to start out with a CT scan of the abdomen pelvis to better evaluate her anatomy and hernia defects. She is amenable to having this done and will follow up with me in 2 weeks to review the results and discuss surgical options.           Lemuel Garcia MD

## 2021-07-05 RX ORDER — LISINOPRIL AND HYDROCHLOROTHIAZIDE 20; 25 MG/1; MG/1
TABLET ORAL
Qty: 30 TABLET | Refills: 0 | Status: SHIPPED | OUTPATIENT
Start: 2021-07-05 | End: 2021-08-23

## 2021-07-06 ENCOUNTER — HOSPITAL ENCOUNTER (OUTPATIENT)
Dept: MAMMOGRAPHY | Age: 61
Discharge: HOME | End: 2021-07-06
Attending: NURSE PRACTITIONER
Payer: COMMERCIAL

## 2021-07-06 DIAGNOSIS — Z12.31 SCREENING MAMMOGRAM, ENCOUNTER FOR: ICD-10-CM

## 2021-07-06 PROCEDURE — 77067 SCR MAMMO BI INCL CAD: CPT

## 2021-07-12 ENCOUNTER — TELEPHONE (OUTPATIENT)
Dept: SURGERY | Age: 61
End: 2021-07-12

## 2021-07-12 NOTE — TELEPHONE ENCOUNTER
Called pt to remind her of her scheduled appt and the imaging needed prior to appt. Pt verbalized understanding and stated that after the call she would schedule her imaging appt.

## 2021-07-13 ENCOUNTER — TELEPHONE (OUTPATIENT)
Dept: SURGERY | Age: 61
End: 2021-07-13

## 2021-07-13 NOTE — TELEPHONE ENCOUNTER
I spoke with patient today to remind of appointment for tomorrow. She says that she has not heard from scheduling to schedule CT scan. I am going to cancel tomorrow's appointment. Please resend order to scheduling. Thanks!

## 2021-07-13 NOTE — TELEPHONE ENCOUNTER
Called to inform pt that she has to call and schedule the imaging appt and the order is still on file. VM left to return call.

## 2021-07-28 ENCOUNTER — HOSPITAL ENCOUNTER (OUTPATIENT)
Dept: CT IMAGING | Age: 61
Discharge: HOME | End: 2021-07-28
Attending: COLON & RECTAL SURGERY
Payer: COMMERCIAL

## 2021-07-28 ENCOUNTER — HOSPITAL ENCOUNTER (OUTPATIENT)
Dept: LAB | Age: 61
Discharge: HOME | End: 2021-07-28
Attending: COLON & RECTAL SURGERY
Payer: COMMERCIAL

## 2021-07-28 ENCOUNTER — TRANSCRIBE ORDER (OUTPATIENT)
Dept: REGISTRATION | Age: 61
End: 2021-07-28

## 2021-07-28 DIAGNOSIS — R10.84 GENERALIZED ABDOMINAL PAIN: Primary | ICD-10-CM

## 2021-07-28 DIAGNOSIS — R10.84 GENERALIZED ABDOMINAL PAIN: ICD-10-CM

## 2021-07-28 DIAGNOSIS — K43.2 RECURRENT VENTRAL HERNIA: ICD-10-CM

## 2021-07-28 LAB — CREAT SERPL-MCNC: 0.88 MG/DL (ref 0.55–1.02)

## 2021-07-28 PROCEDURE — 36415 COLL VENOUS BLD VENIPUNCTURE: CPT

## 2021-07-28 PROCEDURE — 74177 CT ABD & PELVIS W/CONTRAST: CPT

## 2021-07-28 PROCEDURE — 82565 ASSAY OF CREATININE: CPT

## 2021-07-28 PROCEDURE — 74011000636 HC RX REV CODE- 636: Performed by: COLON & RECTAL SURGERY

## 2021-07-28 RX ADMIN — IOPAMIDOL 100 ML: 755 INJECTION, SOLUTION INTRAVENOUS at 10:56

## 2021-08-23 DIAGNOSIS — F33.1 MODERATE EPISODE OF RECURRENT MAJOR DEPRESSIVE DISORDER (HCC): ICD-10-CM

## 2021-08-23 RX ORDER — SERTRALINE HYDROCHLORIDE 25 MG/1
TABLET, FILM COATED ORAL
Qty: 90 TABLET | Refills: 0 | Status: SHIPPED | OUTPATIENT
Start: 2021-08-23 | End: 2021-11-05

## 2021-09-22 DIAGNOSIS — K21.9 GASTROESOPHAGEAL REFLUX DISEASE WITHOUT ESOPHAGITIS: ICD-10-CM

## 2021-09-22 RX ORDER — OMEPRAZOLE 40 MG/1
CAPSULE, DELAYED RELEASE ORAL
Qty: 30 CAPSULE | Refills: 0 | Status: SHIPPED | OUTPATIENT
Start: 2021-09-22 | End: 2021-11-05

## 2021-09-22 RX ORDER — ALBUTEROL SULFATE 90 UG/1
AEROSOL, METERED RESPIRATORY (INHALATION)
Qty: 9 G | Refills: 0 | Status: SHIPPED | OUTPATIENT
Start: 2021-09-22 | End: 2022-06-28 | Stop reason: SDUPTHER

## 2021-11-05 DIAGNOSIS — F33.1 MODERATE EPISODE OF RECURRENT MAJOR DEPRESSIVE DISORDER (HCC): ICD-10-CM

## 2021-11-05 DIAGNOSIS — K21.9 GASTROESOPHAGEAL REFLUX DISEASE WITHOUT ESOPHAGITIS: ICD-10-CM

## 2021-11-05 RX ORDER — OMEPRAZOLE 40 MG/1
CAPSULE, DELAYED RELEASE ORAL
Qty: 30 CAPSULE | Refills: 0 | Status: SHIPPED | OUTPATIENT
Start: 2021-11-05 | End: 2022-01-18

## 2021-11-05 RX ORDER — SERTRALINE HYDROCHLORIDE 25 MG/1
TABLET, FILM COATED ORAL
Qty: 90 TABLET | Refills: 0 | Status: SHIPPED | OUTPATIENT
Start: 2021-11-05 | End: 2022-04-28

## 2021-11-10 DIAGNOSIS — F31.60 MIXED BIPOLAR DISORDER (HCC): ICD-10-CM

## 2021-11-10 RX ORDER — QUETIAPINE FUMARATE 50 MG/1
TABLET, FILM COATED ORAL
Qty: 90 TABLET | Refills: 0 | OUTPATIENT
Start: 2021-11-10

## 2021-11-12 DIAGNOSIS — F31.60 MIXED BIPOLAR DISORDER (HCC): ICD-10-CM

## 2021-11-12 RX ORDER — QUETIAPINE FUMARATE 50 MG/1
50 TABLET, FILM COATED ORAL
Qty: 90 TABLET | Refills: 0 | Status: SHIPPED | OUTPATIENT
Start: 2021-11-12 | End: 2022-04-28 | Stop reason: SDUPTHER

## 2021-11-12 NOTE — TELEPHONE ENCOUNTER
Requested Prescriptions     Pending Prescriptions Disp Refills    QUEtiapine (SEROquel) 50 mg tablet 90 Tablet 0

## 2022-03-18 PROBLEM — E78.00 HYPERCHOLESTEREMIA: Status: ACTIVE | Noted: 2019-01-28

## 2022-03-19 PROBLEM — R07.89 OTHER CHEST PAIN: Status: ACTIVE | Noted: 2020-07-24

## 2022-03-19 PROBLEM — F31.9 BIPOLAR DISORDER, UNSPECIFIED (HCC): Status: ACTIVE | Noted: 2019-01-28

## 2022-03-19 PROBLEM — M54.30 SCIATICA: Status: ACTIVE | Noted: 2019-01-28

## 2022-03-19 PROBLEM — E87.6 LOW SERUM POTASSIUM: Status: ACTIVE | Noted: 2020-07-24

## 2022-03-19 PROBLEM — I10 ESSENTIAL (PRIMARY) HYPERTENSION: Status: ACTIVE | Noted: 2019-01-28

## 2022-03-20 PROBLEM — K43.2 RECURRENT VENTRAL HERNIA: Status: ACTIVE | Noted: 2021-06-30

## 2022-04-27 ENCOUNTER — TELEPHONE (OUTPATIENT)
Dept: BEHAVIORAL/MENTAL HEALTH CLINIC | Age: 62
End: 2022-04-27

## 2022-04-27 NOTE — TELEPHONE ENCOUNTER
Patient called and stated she is at a breaking point. She is scared that she is going to hurt herself. She stated she has lost her job and her family is scared for her safety. Originally gave her an appointment for tomorrow at 4 for an 15 min slot but Hao Oswald advised me to inform you and Yadi Rueda of the situation.

## 2022-04-28 ENCOUNTER — OFFICE VISIT (OUTPATIENT)
Dept: BEHAVIORAL/MENTAL HEALTH CLINIC | Age: 62
End: 2022-04-28
Payer: COMMERCIAL

## 2022-04-28 VITALS — BODY MASS INDEX: 26 KG/M2 | WEIGHT: 145.6 LBS

## 2022-04-28 DIAGNOSIS — F31.60 MIXED BIPOLAR DISORDER (HCC): ICD-10-CM

## 2022-04-28 PROCEDURE — 99214 OFFICE O/P EST MOD 30 MIN: CPT | Performed by: NURSE PRACTITIONER

## 2022-04-28 RX ORDER — QUETIAPINE FUMARATE 25 MG/1
25 TABLET, FILM COATED ORAL EVERY MORNING
Qty: 30 TABLET | Refills: 1 | Status: SHIPPED | OUTPATIENT
Start: 2022-04-28 | End: 2022-05-28

## 2022-04-28 RX ORDER — QUETIAPINE FUMARATE 100 MG/1
100 TABLET, FILM COATED ORAL
Qty: 30 TABLET | Refills: 1 | Status: SHIPPED | OUTPATIENT
Start: 2022-04-28 | End: 2022-05-28
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
## 2022-04-30 DIAGNOSIS — K21.9 GASTROESOPHAGEAL REFLUX DISEASE WITHOUT ESOPHAGITIS: ICD-10-CM

## 2022-05-01 RX ORDER — OMEPRAZOLE 40 MG/1
CAPSULE, DELAYED RELEASE ORAL
Qty: 30 CAPSULE | Refills: 0 | Status: SHIPPED | OUTPATIENT
Start: 2022-05-01 | End: 2022-06-12

## 2022-06-11 DIAGNOSIS — K21.9 GASTROESOPHAGEAL REFLUX DISEASE WITHOUT ESOPHAGITIS: ICD-10-CM

## 2022-06-12 RX ORDER — OMEPRAZOLE 40 MG/1
CAPSULE, DELAYED RELEASE ORAL
Qty: 30 CAPSULE | Refills: 0 | Status: SHIPPED | OUTPATIENT
Start: 2022-06-12 | End: 2022-08-15

## 2022-06-16 ENCOUNTER — TELEPHONE (OUTPATIENT)
Dept: BEHAVIORAL/MENTAL HEALTH CLINIC | Age: 62
End: 2022-06-16

## 2022-06-28 ENCOUNTER — OFFICE VISIT (OUTPATIENT)
Dept: PRIMARY CARE CLINIC | Age: 62
End: 2022-06-28
Payer: COMMERCIAL

## 2022-06-28 VITALS
SYSTOLIC BLOOD PRESSURE: 135 MMHG | RESPIRATION RATE: 18 BRPM | WEIGHT: 147.8 LBS | HEIGHT: 60 IN | HEART RATE: 68 BPM | TEMPERATURE: 97.3 F | DIASTOLIC BLOOD PRESSURE: 89 MMHG | BODY MASS INDEX: 29.02 KG/M2 | OXYGEN SATURATION: 99 %

## 2022-06-28 DIAGNOSIS — J44.9 CHRONIC OBSTRUCTIVE PULMONARY DISEASE, UNSPECIFIED COPD TYPE (HCC): Primary | ICD-10-CM

## 2022-06-28 DIAGNOSIS — Z11.3 ROUTINE SCREENING FOR STI (SEXUALLY TRANSMITTED INFECTION): ICD-10-CM

## 2022-06-28 DIAGNOSIS — K21.9 GASTROESOPHAGEAL REFLUX DISEASE WITHOUT ESOPHAGITIS: ICD-10-CM

## 2022-06-28 DIAGNOSIS — K46.9 ABDOMINAL HERNIA WITHOUT OBSTRUCTION AND WITHOUT GANGRENE, RECURRENCE NOT SPECIFIED, UNSPECIFIED HERNIA TYPE: ICD-10-CM

## 2022-06-28 DIAGNOSIS — I83.813 VARICOSE VEINS OF BILATERAL LOWER EXTREMITIES WITH PAIN: ICD-10-CM

## 2022-06-28 DIAGNOSIS — F31.60 MIXED BIPOLAR DISORDER (HCC): ICD-10-CM

## 2022-06-28 DIAGNOSIS — F17.200 TOBACCO DEPENDENCE: ICD-10-CM

## 2022-06-28 DIAGNOSIS — I10 ESSENTIAL (PRIMARY) HYPERTENSION: ICD-10-CM

## 2022-06-28 DIAGNOSIS — D50.9 IRON DEFICIENCY ANEMIA, UNSPECIFIED IRON DEFICIENCY ANEMIA TYPE: ICD-10-CM

## 2022-06-28 DIAGNOSIS — H61.22 IMPACTED CERUMEN OF LEFT EAR: ICD-10-CM

## 2022-06-28 PROCEDURE — 99214 OFFICE O/P EST MOD 30 MIN: CPT | Performed by: NURSE PRACTITIONER

## 2022-06-28 RX ORDER — QUETIAPINE FUMARATE 25 MG/1
25 TABLET, FILM COATED ORAL DAILY
COMMUNITY
Start: 2022-06-11 | End: 2022-08-15

## 2022-06-28 RX ORDER — ALBUTEROL SULFATE 90 UG/1
AEROSOL, METERED RESPIRATORY (INHALATION)
Qty: 9 G | Refills: 2 | Status: SHIPPED | OUTPATIENT
Start: 2022-06-28

## 2022-06-28 NOTE — PROGRESS NOTES
Chief Complaint   Patient presents with    Leg Pain    Varicose Veins     Pt also wants to give urine and check for std , she does not want to have a vaginal swap or any issues, she just wants to make sure the allison she was having sex with doesn't have anything because he cheated. 1. \"Have you been to the ER, urgent care clinic since your last visit? Hospitalized since your last visit? \" No    2. \"Have you seen or consulted any other health care providers outside of the [address] [place] [address] since your last visit? \" No     3. For patients aged 39-70: Has the patient had a colonoscopy / FIT/ Cologuard? Yes - no Care Gap present      If the patient is female:    4. For patients aged 41-77: Has the patient had a mammogram within the past 2 years? Yes - no Care Gap present      5. For patients aged 21-65: Has the patient had a pap smear?  Yes - no Care Gap present

## 2022-06-28 NOTE — PROGRESS NOTES
Sparkle Wyatt is a 64 y.o. female who presents to the office today for the following:    Chief Complaint   Patient presents with    Leg Pain    Varicose Veins       Past Medical History:   Diagnosis Date    Asthma     Bipolar disorder, unspecified ([facility] 1/28/2019    Essential (primary) hypertension 1/28/2019    Hypercholesteremia 1/28/2019    Sciatica 1/28/2019       Past Surgical History:   Procedure Laterality Date    HX HERNIA REPAIR      HX HYSTERECTOMY          Family History   Problem Relation Age of Onset    Hypertension Mother     Diabetes Mother     No Known Problems Father         Social History     Tobacco Use    Smoking status: Current Every Day Smoker     Packs/day: 1.00     Years: 21.00     Pack years: 21.00    Smokeless tobacco: Never Used   Vaping Use    Vaping Use: Never used   Substance Use Topics    Alcohol use: Not Currently     Comment: occasionally    Drug use: Never        HPI  Patient here today for follow up with PMH of copd, GERD, bipolar disorder, hypertension and tobacco dependence. States that she is taking medication as directed. Following with psychiatry for bipolar disorder. States that she has been having problems with the veins in her legs swelling and become tender. Does not have any today but was having problems a few weeks ago. Also reports that she is still having pain over hernia. Had CT done after seeing Dr. Stephanie Yang but reports she never received results or follow up. Denies any increased pain to area. Lastly, has new sexual partner and wants to get some STI screening. No symptoms or known exposure.     Current Outpatient Medications on File Prior to Visit   Medication Sig    QUEtiapine (SEROquel) 25 mg tablet     omeprazole (PRILOSEC) 40 mg capsule Take 1 capsule by mouth once daily    lisinopril-hydroCHLOROthiazide (PRINZIDE, ZESTORETIC) 20-25 mg per tablet TAKE 1 TABLET BY MOUTH ONCE DAILY IN THE MORNING    isosorbide mononitrate ER (IMDUR) 30 mg tablet Take 1 Tablet by mouth daily.  naproxen (NAPROSYN) 500 mg tablet Take 1 Tab by mouth two (2) times daily (with meals). (Patient not taking: Reported on 6/30/2021)     No current facility-administered medications on file prior to visit. Medications Ordered Today   Medications    albuterol (PROVENTIL HFA, VENTOLIN HFA, PROAIR HFA) 90 mcg/actuation inhaler     Sig: INHALE 2 PUFFS BY MOUTH EVERY 4 TO 6 HOURS AS NEEDED     Dispense:  9 g     Refill:  2        Review of Systems   Constitutional: Negative. HENT: Negative. Eyes: Negative. Respiratory: Negative for cough, sputum production, shortness of breath and wheezing. Cardiovascular: Negative. Gastrointestinal: Positive for abdominal pain (over hernia). Negative for blood in stool, constipation, diarrhea, heartburn, melena, nausea and vomiting. Genitourinary: Negative. Musculoskeletal: Negative. Skin: Negative. Neurological: Negative. Visit Vitals  /89 (BP 1 Location: Left upper arm, BP Patient Position: Sitting, BP Cuff Size: Adult)   Pulse 68   Temp 97.3 °F (36.3 °C) (Temporal)   Resp 18   Ht 5' (1.524 m)   Wt 147 lb 12.8 oz (67 kg)   SpO2 99%   BMI 28.87 kg/m²       Physical Exam  Vitals and nursing note reviewed. Constitutional:       Appearance: Normal appearance. Eyes:      Pupils: Pupils are equal, round, and reactive to light. Neck:      Vascular: No carotid bruit. Cardiovascular:      Rate and Rhythm: Normal rate and regular rhythm. Pulses: Normal pulses. Heart sounds: Normal heart sounds. Comments: Multiple non-tender varicose veins to bilateral lower extremities  Pulmonary:      Effort: Pulmonary effort is normal.      Breath sounds: Normal breath sounds. Abdominal:      General: Bowel sounds are normal.      Palpations: Abdomen is soft. Tenderness: There is abdominal tenderness (mid-abdomen). There is no guarding or rebound.    Musculoskeletal:         General: Normal range of motion. Right lower leg: No edema. Left lower leg: No edema. Lymphadenopathy:      Cervical: No cervical adenopathy. Skin:     General: Skin is warm and dry. Neurological:      Mental Status: She is alert and oriented to person, place, and time. Mental status is at baseline. Psychiatric:         Mood and Affect: Mood normal.         Behavior: Behavior normal.          1. Chronic obstructive pulmonary disease, unspecified COPD type ([facility]  She reports intermittent use of albuterol inhaler but not > twice weekly    2. Gastroesophageal reflux disease without esophagitis  Continue prilosec as directed    4. Mixed bipolar disorder ([facility]  Symptoms have been stable and is followed by psychiatry    5. Essential (primary) hypertension  Blood pressure is controlled and continue medication as directed  Check readings at home and notify provider if > 140/90  Check fasting labs (she will RTO for lab visit)  - CBC WITH AUTOMATED DIFF  - METABOLIC PANEL, COMPREHENSIVE  - URINALYSIS W/ RFLX MICROSCOPIC  - LIPID PANEL    6. Tobacco dependence  Continue to encourage smoking cessation and have counseled on risks with continue use. 7. Abdominal hernia without obstruction and without gangrene, recurrence not specified, unspecified hernia type  CT Results (most recent):  Results from Hospital Encounter encounter on 07/28/21    CT ABD PELV W CONT    Narrative  CT abdomen and pelvis with IV contrast.    Comparison CT abdomen and pelvis December 11, 2020. Axial images are reviewed along with reformatted sagittal/coronal images. 100 mL  Isovue 370 administered. Dose reduction: All CT scans at this facility are performed using dose reduction  optimization techniques as appropriate to a performed exam including the  following-  automated exposure control, adjustments of mA and/or Kv according to patient  size, or use of iterative reconstructive technique. Lung bases are clear.     Enhanced images demonstrate normal contrast opacification for the liver. Gallbladder nondistended. Normal volume spleen with probable 1-2 cm cyst or  hemangioma. Pancreas and bilateral adrenal glands appear unremarkable. Normal  enhancement for each kidney. Stomach and small bowel loops are decompressed. Oral contrast, stool, and air  through colon. No ascites. Atherosclerotic change normal caliber abdominal aorta with extension to pelvic  arteries. Surgical staples/sutures associated with anterior mesh midline abdomen. Mesh  appears to be placed both above and below the umbilicus. In the interval between  mesh at the level of the umbilicus, there is a 3.5 cm x 2.5 x 3 cm  fat-containing periumbilical hernia; similar appearance compared to prior  imaging. Impression  Mesh related to prior hernia repair anterior abdominal wall both  above and below the level of the umbilicus. In the interval between mesh, periumbilical region, there is a 3.5 cm x 2.5 cm x  3 cm fat-containing periumbilical hernia. Reports she did not hear from results and encouraged to schedule follow up to discuss   [address]    7. Varicose veins of bilateral lower extremities with pain  Recommend compression stockings 25-35mmhg  Will refer to vascular to evaluate  - REFERRAL TO VASCULAR SURGERY    8. Routine screening for STI (sexually transmitted infection)  No current symptoms but requesting screening due to new sexual partner  - CT/NG/T.VAGINALIS AMPLIFICATION  - HIV 1/2 AG/AB, 4TH GENERATION,W RFLX CONFIRM  - HEPATITIS C AB    9. Impacted cerumen of left ear  Recommend use of OTC debrox ear wax remover  May RTO in 2-3 days to have ear flushed    Patient verbalizes understanding of plan of care as discussed above    Follow-up and Dispositions    · Return in about 3 months (around 9/28/2022) for or sooner for worsening symptoms.

## 2022-06-29 LAB — TSH SERPL DL<=0.005 MIU/L-ACNC: 1.41 UIU/ML (ref 0.45–4.5)

## 2022-07-01 LAB
ALBUMIN SERPL-MCNC: 4.6 G/DL (ref 3.8–4.8)
ALBUMIN/GLOB SERPL: 1.7 {RATIO} (ref 1.2–2.2)
ALP SERPL-CCNC: 87 IU/L (ref 44–121)
ALT SERPL-CCNC: 15 IU/L (ref 0–32)
APPEARANCE UR: CLEAR
AST SERPL-CCNC: 19 IU/L (ref 0–40)
BACTERIA #/AREA URNS HPF: NORMAL /[HPF]
BASOPHILS # BLD AUTO: 0 X10E3/UL (ref 0–0.2)
BASOPHILS NFR BLD AUTO: 0 %
BILIRUB SERPL-MCNC: 0.3 MG/DL (ref 0–1.2)
BILIRUB UR QL STRIP: NEGATIVE
BUN SERPL-MCNC: 16 MG/DL (ref 8–27)
BUN/CREAT SERPL: 20 (ref 12–28)
C TRACH RRNA SPEC QL NAA+PROBE: NEGATIVE
CALCIUM SERPL-MCNC: 9.9 MG/DL (ref 8.7–10.3)
CASTS URNS QL MICRO: NORMAL /LPF
CHLORIDE SERPL-SCNC: 98 MMOL/L (ref 96–106)
CHOLEST SERPL-MCNC: 149 MG/DL (ref 100–199)
CO2 SERPL-SCNC: 23 MMOL/L (ref 20–29)
COLOR UR: YELLOW
CREAT SERPL-MCNC: 0.79 MG/DL (ref 0.57–1)
EGFR: 85 ML/MIN/1.73
EOSINOPHIL # BLD AUTO: 0.1 X10E3/UL (ref 0–0.4)
EOSINOPHIL NFR BLD AUTO: 1 %
EPI CELLS #/AREA URNS HPF: NORMAL /HPF (ref 0–10)
ERYTHROCYTE [DISTWIDTH] IN BLOOD BY AUTOMATED COUNT: 16.3 % (ref 11.7–15.4)
GLOBULIN SER CALC-MCNC: 2.7 G/DL (ref 1.5–4.5)
GLUCOSE SERPL-MCNC: 89 MG/DL (ref 65–99)
GLUCOSE UR QL STRIP: NEGATIVE
HCT VFR BLD AUTO: 39.6 % (ref 34–46.6)
HCV AB S/CO SERPL IA: 0.1 S/CO RATIO (ref 0–0.9)
HDLC SERPL-MCNC: 48 MG/DL
HGB BLD-MCNC: 11.9 G/DL (ref 11.1–15.9)
HGB UR QL STRIP: ABNORMAL
HIV 1+2 AB+HIV1 P24 AG SERPL QL IA: NON REACTIVE
IMM GRANULOCYTES # BLD AUTO: 0 X10E3/UL (ref 0–0.1)
IMM GRANULOCYTES NFR BLD AUTO: 0 %
KETONES UR QL STRIP: NEGATIVE
LDLC SERPL CALC-MCNC: 69 MG/DL (ref 0–99)
LEUKOCYTE ESTERASE UR QL STRIP: NEGATIVE
LYMPHOCYTES # BLD AUTO: 2 X10E3/UL (ref 0.7–3.1)
LYMPHOCYTES NFR BLD AUTO: 29 %
MCH RBC QN AUTO: 22.1 PG (ref 26.6–33)
MCHC RBC AUTO-ENTMCNC: 30.1 G/DL (ref 31.5–35.7)
MCV RBC AUTO: 74 FL (ref 79–97)
MICRO URNS: ABNORMAL
MONOCYTES # BLD AUTO: 0.4 X10E3/UL (ref 0.1–0.9)
MONOCYTES NFR BLD AUTO: 5 %
N GONORRHOEA RRNA SPEC QL NAA+PROBE: NEGATIVE
NEUTROPHILS # BLD AUTO: 4.6 X10E3/UL (ref 1.4–7)
NEUTROPHILS NFR BLD AUTO: 65 %
NITRITE UR QL STRIP: NEGATIVE
PH UR STRIP: 5.5 [PH] (ref 5–7.5)
PLATELET # BLD AUTO: 398 X10E3/UL (ref 150–450)
POTASSIUM SERPL-SCNC: 4 MMOL/L (ref 3.5–5.2)
PROT SERPL-MCNC: 7.3 G/DL (ref 6–8.5)
PROT UR QL STRIP: ABNORMAL
RBC # BLD AUTO: 5.38 X10E6/UL (ref 3.77–5.28)
RBC #/AREA URNS HPF: NORMAL /HPF (ref 0–2)
SODIUM SERPL-SCNC: 138 MMOL/L (ref 134–144)
SP GR UR STRIP: 1.02 (ref 1–1.03)
T VAGINALIS RRNA SPEC QL NAA+PROBE: NEGATIVE
TRIGL SERPL-MCNC: 196 MG/DL (ref 0–149)
UROBILINOGEN UR STRIP-MCNC: 0.2 MG/DL (ref 0.2–1)
VLDLC SERPL CALC-MCNC: 32 MG/DL (ref 5–40)
WBC # BLD AUTO: 7.1 X10E3/UL (ref 3.4–10.8)
WBC #/AREA URNS HPF: NORMAL /HPF (ref 0–5)

## 2022-07-01 NOTE — PROGRESS NOTES
Please let patient  know that blood counts are slightly decreased and have indication of iron deficiency anemia developing. Want her to come by and complete hemoccult cards but would also recommend she consider having the colonoscopy as this is due again this year (last 2017 and recommended repeat in 5 years with Dr. Earl Wilder). Let me know what she says. Otherwise the labwork is stable. If any questions, let me know.

## 2022-07-06 DIAGNOSIS — D64.9 ANEMIA, UNSPECIFIED TYPE: Primary | ICD-10-CM

## 2022-07-06 NOTE — PROGRESS NOTES
Informed patient of lab results and recommendations per JAILYN Fenton NP. Patient agreed to the colonoscopy and to see Dr. Twila Sapp.  Stated she is out of town and will be back on Friday to get cards.

## 2022-07-07 NOTE — PROGRESS NOTES
Informed patient of lab results and recommendations per JAILYN Meyers NP. Patient agreed to the Colonoscopy and stated she is on vacation but will come by for the stool cards later.

## 2022-07-18 ENCOUNTER — OFFICE VISIT (OUTPATIENT)
Dept: SURGERY | Age: 62
End: 2022-07-18
Payer: COMMERCIAL

## 2022-07-18 VITALS
BODY MASS INDEX: 28.58 KG/M2 | HEART RATE: 99 BPM | HEIGHT: 60 IN | OXYGEN SATURATION: 98 % | SYSTOLIC BLOOD PRESSURE: 117 MMHG | RESPIRATION RATE: 18 BRPM | DIASTOLIC BLOOD PRESSURE: 71 MMHG | TEMPERATURE: 97.5 F | WEIGHT: 145.6 LBS

## 2022-07-18 DIAGNOSIS — K43.2 RECURRENT VENTRAL HERNIA: Primary | ICD-10-CM

## 2022-07-18 PROCEDURE — 99214 OFFICE O/P EST MOD 30 MIN: CPT | Performed by: COLON & RECTAL SURGERY

## 2022-07-18 NOTE — PROGRESS NOTES
Chief Complaint   Patient presents with    Follow-up     from July 2021 CT Scan Results     Visit Vitals  /71 (BP 1 Location: Left arm, BP Patient Position: Sitting)   Pulse 99   Temp 97.5 °F (36.4 °C) (Temporal)   Resp 18   Ht 5' (1.524 m)   Wt 145 lb 9.6 oz (66 kg)   SpO2 98%   BMI 28.44 kg/m²     1. Have you been to the ER, urgent care clinic since your last visit? Hospitalized since your last visit? No    2. Have you seen or consulted any other health care providers outside of the [address] [place] [address] since your last visit? Include any pap smears or colon screening.  No

## 2022-07-18 NOTE — LETTER
8/7/2022    Patient: Elan Snow   YOB: 1960   Date of Visit: 7/18/2022     Earnest Kirk NP  [id number] [facility]  Via In Basket    Dear Earnest Kirk NP,      Thank you for referring Ms. Elan Snow to [address] [place] [address] for evaluation. My notes for this consultation are attached. If you have questions, please do not hesitate to call me. I look forward to following your patient along with you.       Sincerely,    Conley Kanner, MD

## 2022-07-20 ENCOUNTER — TELEPHONE (OUTPATIENT)
Dept: SURGERY | Age: 62
End: 2022-07-20

## 2022-07-20 NOTE — TELEPHONE ENCOUNTER
Patient call today. She said she will be having a upcoming procedure schedule on 08.12.2022 for repair hernia mesh removal. Patient will need [facility] and transportation to and form hospital after her procedure. Give patient a call back at [phone].

## 2022-07-20 NOTE — TELEPHONE ENCOUNTER
Spoke with  I informed her that the home health if needed will be ordered at the hospital when she is discharged. I also informed her that the transportation needs to be set up with her insurance so she will need to give them a call. She states that she already has the transportation set up so that is a go.

## 2022-08-05 ENCOUNTER — HOSPITAL ENCOUNTER (OUTPATIENT)
Dept: PREADMISSION TESTING | Age: 62
Discharge: HOME | End: 2022-08-05
Payer: COMMERCIAL

## 2022-08-05 VITALS
DIASTOLIC BLOOD PRESSURE: 81 MMHG | SYSTOLIC BLOOD PRESSURE: 143 MMHG | BODY MASS INDEX: 27.71 KG/M2 | HEART RATE: 95 BPM | TEMPERATURE: 98.4 F | HEIGHT: 62 IN | RESPIRATION RATE: 18 BRPM | OXYGEN SATURATION: 97 % | WEIGHT: 150.6 LBS

## 2022-08-05 LAB
ABO + RH BLD: NORMAL
ANION GAP SERPL CALC-SCNC: 7 MMOL/L (ref 5–15)
ATRIAL RATE: 83 BPM
BLOOD GROUP ANTIBODIES SERPL: NEGATIVE
BUN SERPL-MCNC: 12 MG/DL (ref 6–20)
BUN/CREAT SERPL: 16 (ref 12–20)
CA-I BLD-MCNC: 9.3 MG/DL (ref 8.5–10.1)
CALCULATED P AXIS, ECG09: 54 DEGREES
CALCULATED R AXIS, ECG10: 18 DEGREES
CALCULATED T AXIS, ECG11: 22 DEGREES
CHLORIDE SERPL-SCNC: 105 MMOL/L (ref 97–108)
CO2 SERPL-SCNC: 26 MMOL/L (ref 21–32)
CREAT SERPL-MCNC: 0.74 MG/DL (ref 0.55–1.02)
DIAGNOSIS, 93000: NORMAL
ERYTHROCYTE [DISTWIDTH] IN BLOOD BY AUTOMATED COUNT: 17 % (ref 11.5–14.5)
EST. AVERAGE GLUCOSE BLD GHB EST-MCNC: 117 MG/DL
GLUCOSE SERPL-MCNC: 119 MG/DL (ref 65–100)
HBA1C MFR BLD: 5.7 % (ref 4–5.6)
HCT VFR BLD AUTO: 34.8 % (ref 35–47)
HGB BLD-MCNC: 10.8 G/DL (ref 11.5–16)
MCH RBC QN AUTO: 22.2 PG (ref 26–34)
MCHC RBC AUTO-ENTMCNC: 31 G/DL (ref 30–36.5)
MCV RBC AUTO: 71.6 FL (ref 80–99)
MRSA DNA SPEC QL NAA+PROBE: NOT DETECTED
NRBC # BLD: 0 K/UL (ref 0–0.01)
NRBC BLD-RTO: 0 PER 100 WBC
P-R INTERVAL, ECG05: 154 MS
PLATELET # BLD AUTO: 353 K/UL (ref 150–400)
PMV BLD AUTO: 10 FL (ref 8.9–12.9)
POTASSIUM SERPL-SCNC: 3.6 MMOL/L (ref 3.5–5.1)
Q-T INTERVAL, ECG07: 356 MS
QRS DURATION, ECG06: 78 MS
QTC CALCULATION (BEZET), ECG08: 418 MS
RBC # BLD AUTO: 4.86 M/UL (ref 3.8–5.2)
SODIUM SERPL-SCNC: 138 MMOL/L (ref 136–145)
SPECIMEN EXP DATE BLD: NORMAL
VENTRICULAR RATE, ECG03: 83 BPM
WBC # BLD AUTO: 7.7 K/UL (ref 3.6–11)

## 2022-08-05 PROCEDURE — 93005 ELECTROCARDIOGRAM TRACING: CPT

## 2022-08-05 PROCEDURE — 87641 MR-STAPH DNA AMP PROBE: CPT

## 2022-08-05 PROCEDURE — 85027 COMPLETE CBC AUTOMATED: CPT

## 2022-08-05 PROCEDURE — 80048 BASIC METABOLIC PNL TOTAL CA: CPT

## 2022-08-05 PROCEDURE — 83036 HEMOGLOBIN GLYCOSYLATED A1C: CPT

## 2022-08-05 PROCEDURE — 86900 BLOOD TYPING SEROLOGIC ABO: CPT

## 2022-08-05 PROCEDURE — 36415 COLL VENOUS BLD VENIPUNCTURE: CPT

## 2022-08-05 RX ORDER — QUETIAPINE FUMARATE 100 MG/1
100 TABLET, FILM COATED ORAL
COMMUNITY
End: 2022-09-28 | Stop reason: SDUPTHER

## 2022-08-05 NOTE — PROGRESS NOTES
1112- EKG result from today 8/5/22 called to Dr. Xiao Lynn anesthesiologist. No new orders received stated ok to proceed with surgery as scheduled.

## 2022-08-05 NOTE — PROGRESS NOTES
26- Dr. Wili Stone called made aware of patient's abnormal labs CBC-- HGB 10.8, HCT 34.8, MCV 71.6, MCH 22.2, RDW 17.0. No new orders received stated ok to proceed. Also verified with Dr. Wili Stone no bowel prep needed prior to surgery.

## 2022-08-05 NOTE — PROGRESS NOTES
[address] [place] [address] Dr. Ann Vaca patient verbalized concern about prior mesh being removed. Dr. Ann Vaca verified that this is understood and will be done during the procedure.

## 2022-08-05 NOTE — PROGRESS NOTES
1047- Most [facility] Dr. CLARI HANKINS AT [facility] anesthesiologist called made aware of patient's history, cardiac notes and testing, labs to be done in PAT today. Dr. CLARI HANKINS AT [facility] stated patient's blood pressure seems well controlled can give IV meds intraop if needed ordered BMP to be drawn today in PAT, stated otherwise ok to proceed without further testing.

## 2022-08-07 NOTE — PROGRESS NOTES
OFFICE VISIT NOTE    Maggie Menendez is a 64 y.o. female who presents to the office today for:    Chief Complaint   Patient presents with    Follow-up     from July 2021 CT Scan Results       The patient is a 28-year-old female who comes in today for follow-up of recurrent ventral hernia. I saw her approximately 1 month ago and referred her for a CT scan of the abdomen and pelvis. At that time she gave a history of having had 3 ventral hernia repairs in the past and lower abdomen. She stated that she felt discomfort and a bulge at the umbilicus. Today she has similar complaints. She denies any nausea vomiting or obstruction symptoms. Her past medical history seen for history of hypertension, bipolar disorder, hypercholesterolemia, sciatica. In addition to her hernia repairs her surgical history is significant history of hysterectomy.         Past Medical History:   Diagnosis Date    Arthritis     Asthma     Bipolar disorder, unspecified ([address] 01/28/2019    Chronic obstructive pulmonary disease ([facility] [address]     Per PCP note    Essential (primary) hypertension 01/28/2019    GERD (gastroesophageal reflux disease)     Hypercholesteremia 01/28/2019    Ill-defined condition     vein    Sciatica 01/28/2019    Sleep apnea     Patient stated does not use a CPAP    Stress incontinence     Varicose veins of both lower extremities     PER PCP NOTE    Ventral hernia        Past Surgical History:   Procedure Laterality Date    HX HERNIA REPAIR      ventral hernia repair times 3 with mesh    HX HYSTERECTOMY      partial    HX ORTHOPAEDIC      Bunionectomy right great toe    HX OTHER SURGICAL      Epiglottis repair due to allergic reaction       Family History   Problem Relation Age of Onset    Hypertension Mother     Diabetes Mother     Hypertension Father        Social History     Socioeconomic History    Marital status:      Spouse name: Not on file    Number of children: Not on file    Years of education: Not on file    Highest education level: Not on file   Occupational History    Not on file   Tobacco Use    Smoking status: Every Day     Packs/day: 1.00     Years: 21.00     Pack years: 21.00     Types: Cigarettes    Smokeless tobacco: Never   Vaping Use    Vaping Use: Never used   Substance and Sexual Activity    Alcohol use: Yes     Comment: Patient stated has a bourbon drink nightly after dinner    Drug use: Yes     Types: Marijuana    Sexual activity: Not Currently   Other Topics Concern    Not on file   Social History Narrative    Not on file     Social Determinants of Health     Financial Resource Strain: Not on file   Food Insecurity: Not on file   Transportation Needs: Not on file   Physical Activity: Not on file   Stress: Not on file   Social Connections: Not on file   Intimate Partner Violence: Not on file   Housing Stability: Not on file       Allergies   Allergen Reactions    Latex Itching    Hismanal Anaphylaxis       Current Outpatient Medications   Medication Sig    QUEtiapine (SEROquel) 25 mg tablet Take 25 mg by mouth in the morning. albuterol (PROVENTIL HFA, VENTOLIN HFA, PROAIR HFA) 90 mcg/actuation inhaler INHALE 2 PUFFS BY MOUTH EVERY 4 TO 6 HOURS AS NEEDED (Patient taking differently: Take 2 Puffs by inhalation every four (4) hours as needed. INHALE 2 PUFFS BY MOUTH EVERY 4 TO 6 HOURS AS NEEDED)    omeprazole (PRILOSEC) 40 mg capsule Take 1 capsule by mouth once daily (Patient taking differently: Take 40 mg by mouth in the morning.)    lisinopril-hydroCHLOROthiazide (PRINZIDE, ZESTORETIC) 20-25 mg per tablet TAKE 1 TABLET BY MOUTH ONCE DAILY IN THE MORNING (Patient taking differently: Take 1 Tablet by mouth in the morning.)    isosorbide mononitrate ER (IMDUR) 30 mg tablet Take 30 mg by mouth in the morning.     QUEtiapine (SEROquel) 100 mg tablet Take 100 mg by mouth nightly. naproxen (NAPROSYN) 500 mg tablet Take 1 Tab by mouth two (2) times daily (with meals). (Patient not taking: No sig reported)     No current facility-administered medications for this visit. Review of Systems   Constitutional:  Positive for malaise/fatigue. HENT:  Positive for sinus pain and tinnitus. Eyes:  Positive for blurred vision. Respiratory:  Positive for cough, shortness of breath and wheezing. Cardiovascular:  Positive for chest pain and leg swelling. Gastrointestinal:  Positive for abdominal pain. Genitourinary:  Positive for frequency. Musculoskeletal:  Positive for back pain, joint pain and myalgias. Skin:  Positive for rash. Neurological:  Positive for tingling. Psychiatric/Behavioral:  Positive for depression and memory loss. The patient is nervous/anxious and has insomnia. /71 (BP 1 Location: Left arm, BP Patient Position: Sitting)   Pulse 99   Temp 97.5 °F (36.4 °C) (Temporal)   Resp 18   Ht 5' (1.524 m)   Wt 66 kg (145 lb 9.6 oz)   SpO2 98%   BMI 28.44 kg/m²     Physical Exam  Constitutional:       General: She is not in acute distress. Appearance: Normal appearance. She is not ill-appearing. HENT:      Head: Normocephalic and atraumatic. Cardiovascular:      Rate and Rhythm: Normal rate and regular rhythm. Heart sounds: Normal heart sounds. Pulmonary:      Effort: Pulmonary effort is normal.      Breath sounds: Normal breath sounds. Abdominal:          Comments: Hernia at the umbilicus, tender palpation, reducible  Mesh and/or sutures palpable just below the skin at the lower midline incision, very tender to palpation, possible recurrence to the side of the mesh   Musculoskeletal:      Cervical back: Neck supple. Skin:     General: Skin is warm and dry. Neurological:      General: No focal deficit present. Mental Status: She is alert and oriented to person, place, and time.    Psychiatric:         Mood and Affect: Mood normal.         Thought Content: Thought content normal.         Judgment: Judgment normal.       Problem List Items Addressed This Visit          Other    Recurrent ventral hernia - Primary       Assessment and Plan:  I reviewed her CT scan findings with her which demonstrated:     IMPRESSION  Mesh related to prior hernia repair anterior abdominal wall both  above and below the level of the umbilicus. In the interval between mesh, periumbilical region, there is a 3.5 cm x 2.5 cm x  3 cm fat-containing periumbilical hernia. I reviewed surgery with her and told her that it be fairly complex given the fact that she has mesh involvement below the umbilicus and a periumbilical hernia at this point. My suspicion is that this is in the superior aspect of her lower abdominal mesh repair. I told her that she may have to have excision of the lower abdominal mesh with placement of new mesh to repair both the defect in the lower abdomen and the periumbilical defect. I reviewed the risk and benefits and reviewed the risk of infection, bleeding, injury to other intra-abdominal organs and structures including the small and large bowel given the fact that may be adhesions to the abdominal wall mesh. I also reviewed the risk of recurrence. She wishes to proceed with surgery and surgery scheduled for August 12, 2022.     Sunny Quiroz MD

## 2022-08-11 ENCOUNTER — TELEPHONE (OUTPATIENT)
Dept: SURGERY | Age: 62
End: 2022-08-11

## 2022-08-11 NOTE — TELEPHONE ENCOUNTER
Ms Morteza Larry called this morning and wanted to know if she could have her procedure earlier tomorrow w/ Dr Armando Herrera because it would be very difficult for her not to be able to eat for 12 hours, please call when able [phone]

## 2022-08-11 NOTE — TELEPHONE ENCOUNTER
Returned pt call. She says there is no way she can make it without eating for 12 plus hours before surgery. I called Dr. Armando Herrera, he says it is ok to put her on next week, 08/19/22, at 7:30am.  Called pt to tell her we have switched her to next week, 08/19/22, 730am, be at [facility] 600am. Pt was VERY thankful, and stated she will be there. I did tell her she did not have to repeat her pre-admission testing, nothing to eat or drink after midnight, and this is NOT outpatient. Pt verbalized understanding of these instructions.

## 2022-08-12 ENCOUNTER — TELEPHONE (OUTPATIENT)
Dept: SURGERY | Age: 62
End: 2022-08-12

## 2022-08-12 NOTE — TELEPHONE ENCOUNTER
Pt called and she is reading her surgery packet. She says is says to stop smoking 3 days before surgery. Pt is asking if dr. Galina Warren will send in non-smoking patches to her pharmacy, [facility] in [facility], so she can quit before she has her surgery. I told her I will ask Dr. Galina Warren.  Thank you

## 2022-08-15 ENCOUNTER — TELEPHONE (OUTPATIENT)
Dept: SURGERY | Age: 62
End: 2022-08-15

## 2022-08-15 DIAGNOSIS — F33.1 MODERATE EPISODE OF RECURRENT MAJOR DEPRESSIVE DISORDER (HCC): ICD-10-CM

## 2022-08-15 RX ORDER — QUETIAPINE FUMARATE 25 MG/1
TABLET, FILM COATED ORAL
Qty: 30 TABLET | Refills: 0 | Status: SHIPPED | OUTPATIENT
Start: 2022-08-15 | End: 2022-09-28 | Stop reason: SDUPTHER

## 2022-08-15 RX ORDER — SERTRALINE HYDROCHLORIDE 25 MG/1
TABLET, FILM COATED ORAL
Qty: 90 TABLET | Refills: 0 | OUTPATIENT
Start: 2022-08-15

## 2022-08-15 NOTE — TELEPHONE ENCOUNTER
Ms Ross Kinney called this afternoon and stated she asked last week if Dr Alex Vela could send her a prescription for nicotine patch, I advised that I didn't see anything sent in but I would send a message over to the nurse she spoke with last week regarding the patches.  Please call [phone] thank you

## 2022-08-16 PROBLEM — D64.9 ANEMIA: Status: ACTIVE | Noted: 2022-08-16

## 2022-08-16 RX ORDER — IBUPROFEN 200 MG
1 TABLET ORAL EVERY 24 HOURS
Qty: 30 PATCH | Refills: 0 | Status: SHIPPED | OUTPATIENT
Start: 2022-08-16 | End: 2022-09-15

## 2022-08-19 ENCOUNTER — HOSPITAL ENCOUNTER (OUTPATIENT)
Age: 62
Setting detail: OBSERVATION
Discharge: HOME | End: 2022-08-23
Attending: COLON & RECTAL SURGERY | Admitting: COLON & RECTAL SURGERY
Payer: COMMERCIAL

## 2022-08-19 ENCOUNTER — ANESTHESIA EVENT (OUTPATIENT)
Dept: SURGERY | Age: 62
End: 2022-08-19
Payer: COMMERCIAL

## 2022-08-19 ENCOUNTER — ANESTHESIA (OUTPATIENT)
Dept: SURGERY | Age: 62
End: 2022-08-19
Payer: COMMERCIAL

## 2022-08-19 DIAGNOSIS — K43.2 RECURRENT VENTRAL HERNIA: Primary | ICD-10-CM

## 2022-08-19 PROBLEM — Z86.010 PERSONAL HISTORY OF COLONIC POLYPS: Status: ACTIVE | Noted: 2022-08-19

## 2022-08-19 PROBLEM — Z86.0100 PERSONAL HISTORY OF COLONIC POLYPS: Status: ACTIVE | Noted: 2022-08-19

## 2022-08-19 LAB
ABO + RH BLD: NORMAL
BLOOD GROUP ANTIBODIES SERPL: NEGATIVE
CA-I BLD-MCNC: 1.08 MMOL/L (ref 1.12–1.32)
CHLORIDE BLD-SCNC: 107 MMOL/L
CREAT UR-MCNC: 0.8 MG/DL (ref 0.6–1.3)
GLUCOSE BLD STRIP.AUTO-MCNC: 127 MG/DL (ref 74–106)
POTASSIUM BLD-SCNC: 4.1 MMOL/L (ref 3.5–5.5)
SODIUM BLD-SCNC: 139 MMOL/L (ref 136–145)
SPECIMEN EXP DATE BLD: NORMAL

## 2022-08-19 PROCEDURE — 76060000034 HC ANESTHESIA 1.5 TO 2 HR: Performed by: COLON & RECTAL SURGERY

## 2022-08-19 PROCEDURE — 76010000153 HC OR TIME 1.5 TO 2 HR: Performed by: COLON & RECTAL SURGERY

## 2022-08-19 PROCEDURE — 77030040361 HC SLV COMPR DVT MDII -B: Performed by: COLON & RECTAL SURGERY

## 2022-08-19 PROCEDURE — 74011250637 HC RX REV CODE- 250/637

## 2022-08-19 PROCEDURE — 77030031139 HC SUT VCRL2 J&J -A: Performed by: COLON & RECTAL SURGERY

## 2022-08-19 PROCEDURE — 77030026438 HC STYL ET INTUB CARD -A: Performed by: ANESTHESIOLOGY

## 2022-08-19 PROCEDURE — 80047 BASIC METABLC PNL IONIZED CA: CPT

## 2022-08-19 PROCEDURE — 96374 THER/PROPH/DIAG INJ IV PUSH: CPT

## 2022-08-19 PROCEDURE — 74011250636 HC RX REV CODE- 250/636

## 2022-08-19 PROCEDURE — 86900 BLOOD TYPING SEROLOGIC ABO: CPT

## 2022-08-19 PROCEDURE — 88305 TISSUE EXAM BY PATHOLOGIST: CPT

## 2022-08-19 PROCEDURE — 77030008684 HC TU ET CUF COVD -B: Performed by: ANESTHESIOLOGY

## 2022-08-19 PROCEDURE — 96372 THER/PROPH/DIAG INJ SC/IM: CPT

## 2022-08-19 PROCEDURE — 2709999900 HC NON-CHARGEABLE SUPPLY: Performed by: COLON & RECTAL SURGERY

## 2022-08-19 PROCEDURE — 96376 TX/PRO/DX INJ SAME DRUG ADON: CPT

## 2022-08-19 PROCEDURE — 74011250636 HC RX REV CODE- 250/636: Performed by: COLON & RECTAL SURGERY

## 2022-08-19 PROCEDURE — 96375 TX/PRO/DX INJ NEW DRUG ADDON: CPT

## 2022-08-19 PROCEDURE — 74011250636 HC RX REV CODE- 250/636: Performed by: ANESTHESIOLOGY

## 2022-08-19 PROCEDURE — 74011000250 HC RX REV CODE- 250: Performed by: COLON & RECTAL SURGERY

## 2022-08-19 PROCEDURE — 77030002966 HC SUT PDS J&J -A: Performed by: COLON & RECTAL SURGERY

## 2022-08-19 PROCEDURE — 77030013079 HC BLNKT BAIR HGGR 3M -A: Performed by: ANESTHESIOLOGY

## 2022-08-19 PROCEDURE — 77030012058: Performed by: COLON & RECTAL SURGERY

## 2022-08-19 PROCEDURE — 76210000017 HC OR PH I REC 1.5 TO 2 HR: Performed by: COLON & RECTAL SURGERY

## 2022-08-19 PROCEDURE — 74011000250 HC RX REV CODE- 250

## 2022-08-19 PROCEDURE — 74011250637 HC RX REV CODE- 250/637: Performed by: COLON & RECTAL SURGERY

## 2022-08-19 PROCEDURE — 77030026102 HC DEV TISS ENSEAL G2 J&J -F: Performed by: COLON & RECTAL SURGERY

## 2022-08-19 PROCEDURE — G0378 HOSPITAL OBSERVATION PER HR: HCPCS

## 2022-08-19 RX ORDER — SODIUM CHLORIDE 0.9 % (FLUSH) 0.9 %
5-40 SYRINGE (ML) INJECTION EVERY 8 HOURS
Status: DISCONTINUED | OUTPATIENT
Start: 2022-08-19 | End: 2022-08-19

## 2022-08-19 RX ORDER — ALBUTEROL SULFATE 90 UG/1
AEROSOL, METERED RESPIRATORY (INHALATION) AS NEEDED
Status: DISCONTINUED | OUTPATIENT
Start: 2022-08-19 | End: 2022-08-19 | Stop reason: HOSPADM

## 2022-08-19 RX ORDER — ALBUTEROL SULFATE 90 UG/1
2 AEROSOL, METERED RESPIRATORY (INHALATION)
Status: DISCONTINUED | OUTPATIENT
Start: 2022-08-19 | End: 2022-08-19 | Stop reason: CLARIF

## 2022-08-19 RX ORDER — SODIUM CHLORIDE, SODIUM LACTATE, POTASSIUM CHLORIDE, CALCIUM CHLORIDE 600; 310; 30; 20 MG/100ML; MG/100ML; MG/100ML; MG/100ML
INJECTION, SOLUTION INTRAVENOUS
Status: DISCONTINUED | OUTPATIENT
Start: 2022-08-19 | End: 2022-08-19 | Stop reason: HOSPADM

## 2022-08-19 RX ORDER — OXYCODONE AND ACETAMINOPHEN 5; 325 MG/1; MG/1
1 TABLET ORAL
Status: DISCONTINUED | OUTPATIENT
Start: 2022-08-19 | End: 2022-08-23 | Stop reason: HOSPADM

## 2022-08-19 RX ORDER — HYDROMORPHONE HYDROCHLORIDE 1 MG/ML
INJECTION, SOLUTION INTRAMUSCULAR; INTRAVENOUS; SUBCUTANEOUS AS NEEDED
Status: DISCONTINUED | OUTPATIENT
Start: 2022-08-19 | End: 2022-08-19 | Stop reason: HOSPADM

## 2022-08-19 RX ORDER — PROPOFOL 10 MG/ML
INJECTION, EMULSION INTRAVENOUS AS NEEDED
Status: DISCONTINUED | OUTPATIENT
Start: 2022-08-19 | End: 2022-08-19 | Stop reason: HOSPADM

## 2022-08-19 RX ORDER — OXYCODONE AND ACETAMINOPHEN 5; 325 MG/1; MG/1
2 TABLET ORAL
Status: DISCONTINUED | OUTPATIENT
Start: 2022-08-19 | End: 2022-08-23 | Stop reason: HOSPADM

## 2022-08-19 RX ORDER — ROCURONIUM BROMIDE 10 MG/ML
INJECTION, SOLUTION INTRAVENOUS AS NEEDED
Status: DISCONTINUED | OUTPATIENT
Start: 2022-08-19 | End: 2022-08-19 | Stop reason: HOSPADM

## 2022-08-19 RX ORDER — ALBUTEROL SULFATE 2.5 MG/.5ML
2.5 SOLUTION RESPIRATORY (INHALATION)
Status: DISCONTINUED | OUTPATIENT
Start: 2022-08-19 | End: 2022-08-23 | Stop reason: HOSPADM

## 2022-08-19 RX ORDER — MIDAZOLAM HYDROCHLORIDE 1 MG/ML
INJECTION, SOLUTION INTRAMUSCULAR; INTRAVENOUS AS NEEDED
Status: DISCONTINUED | OUTPATIENT
Start: 2022-08-19 | End: 2022-08-19 | Stop reason: HOSPADM

## 2022-08-19 RX ORDER — SODIUM CHLORIDE 0.9 % (FLUSH) 0.9 %
5-40 SYRINGE (ML) INJECTION AS NEEDED
Status: DISCONTINUED | OUTPATIENT
Start: 2022-08-19 | End: 2022-08-19 | Stop reason: HOSPADM

## 2022-08-19 RX ORDER — ONDANSETRON 2 MG/ML
4 INJECTION INTRAMUSCULAR; INTRAVENOUS
Status: DISCONTINUED | OUTPATIENT
Start: 2022-08-19 | End: 2022-08-23 | Stop reason: HOSPADM

## 2022-08-19 RX ORDER — SODIUM CHLORIDE, SODIUM LACTATE, POTASSIUM CHLORIDE, CALCIUM CHLORIDE 600; 310; 30; 20 MG/100ML; MG/100ML; MG/100ML; MG/100ML
1000 INJECTION, SOLUTION INTRAVENOUS CONTINUOUS
Status: DISCONTINUED | OUTPATIENT
Start: 2022-08-19 | End: 2022-08-19 | Stop reason: SDUPTHER

## 2022-08-19 RX ORDER — SODIUM CHLORIDE 9 MG/ML
75 INJECTION, SOLUTION INTRAVENOUS CONTINUOUS
Status: DISCONTINUED | OUTPATIENT
Start: 2022-08-19 | End: 2022-08-20

## 2022-08-19 RX ORDER — SODIUM CHLORIDE, SODIUM LACTATE, POTASSIUM CHLORIDE, CALCIUM CHLORIDE 600; 310; 30; 20 MG/100ML; MG/100ML; MG/100ML; MG/100ML
20 INJECTION, SOLUTION INTRAVENOUS CONTINUOUS
Status: DISCONTINUED | OUTPATIENT
Start: 2022-08-19 | End: 2022-08-19

## 2022-08-19 RX ORDER — QUETIAPINE FUMARATE 100 MG/1
100 TABLET, FILM COATED ORAL
Status: DISCONTINUED | OUTPATIENT
Start: 2022-08-19 | End: 2022-08-23 | Stop reason: HOSPADM

## 2022-08-19 RX ORDER — HYDROMORPHONE HYDROCHLORIDE 1 MG/ML
0.5 INJECTION, SOLUTION INTRAMUSCULAR; INTRAVENOUS; SUBCUTANEOUS
Status: DISCONTINUED | OUTPATIENT
Start: 2022-08-19 | End: 2022-08-19 | Stop reason: HOSPADM

## 2022-08-19 RX ORDER — LIDOCAINE HYDROCHLORIDE 20 MG/ML
INJECTION, SOLUTION EPIDURAL; INFILTRATION; INTRACAUDAL; PERINEURAL AS NEEDED
Status: DISCONTINUED | OUTPATIENT
Start: 2022-08-19 | End: 2022-08-19 | Stop reason: HOSPADM

## 2022-08-19 RX ORDER — DEXAMETHASONE SODIUM PHOSPHATE 4 MG/ML
INJECTION, SOLUTION INTRA-ARTICULAR; INTRALESIONAL; INTRAMUSCULAR; INTRAVENOUS; SOFT TISSUE AS NEEDED
Status: DISCONTINUED | OUTPATIENT
Start: 2022-08-19 | End: 2022-08-19 | Stop reason: HOSPADM

## 2022-08-19 RX ORDER — ISOSORBIDE MONONITRATE 60 MG/1
30 TABLET, EXTENDED RELEASE ORAL DAILY
Status: DISCONTINUED | OUTPATIENT
Start: 2022-08-20 | End: 2022-08-23 | Stop reason: HOSPADM

## 2022-08-19 RX ORDER — CEFAZOLIN SODIUM 1 G/3ML
INJECTION, POWDER, FOR SOLUTION INTRAMUSCULAR; INTRAVENOUS AS NEEDED
Status: DISCONTINUED | OUTPATIENT
Start: 2022-08-19 | End: 2022-08-19 | Stop reason: HOSPADM

## 2022-08-19 RX ORDER — FENTANYL CITRATE 50 UG/ML
INJECTION, SOLUTION INTRAMUSCULAR; INTRAVENOUS AS NEEDED
Status: DISCONTINUED | OUTPATIENT
Start: 2022-08-19 | End: 2022-08-19 | Stop reason: HOSPADM

## 2022-08-19 RX ORDER — ONDANSETRON 2 MG/ML
4 INJECTION INTRAMUSCULAR; INTRAVENOUS AS NEEDED
Status: DISCONTINUED | OUTPATIENT
Start: 2022-08-19 | End: 2022-08-19 | Stop reason: HOSPADM

## 2022-08-19 RX ORDER — KETAMINE HYDROCHLORIDE 10 MG/ML
INJECTION, SOLUTION INTRAMUSCULAR; INTRAVENOUS AS NEEDED
Status: DISCONTINUED | OUTPATIENT
Start: 2022-08-19 | End: 2022-08-19 | Stop reason: HOSPADM

## 2022-08-19 RX ORDER — SODIUM CHLORIDE 0.9 % (FLUSH) 0.9 %
5-40 SYRINGE (ML) INJECTION EVERY 8 HOURS
Status: DISCONTINUED | OUTPATIENT
Start: 2022-08-19 | End: 2022-08-19 | Stop reason: HOSPADM

## 2022-08-19 RX ORDER — DIPHENHYDRAMINE HCL 25 MG
25 CAPSULE ORAL
Status: DISCONTINUED | OUTPATIENT
Start: 2022-08-19 | End: 2022-08-23 | Stop reason: HOSPADM

## 2022-08-19 RX ORDER — BUPIVACAINE HYDROCHLORIDE 2.5 MG/ML
INJECTION, SOLUTION EPIDURAL; INFILTRATION; INTRACAUDAL AS NEEDED
Status: DISCONTINUED | OUTPATIENT
Start: 2022-08-19 | End: 2022-08-19 | Stop reason: HOSPADM

## 2022-08-19 RX ORDER — FENTANYL CITRATE 50 UG/ML
25 INJECTION, SOLUTION INTRAMUSCULAR; INTRAVENOUS
Status: DISCONTINUED | OUTPATIENT
Start: 2022-08-19 | End: 2022-08-19 | Stop reason: HOSPADM

## 2022-08-19 RX ORDER — ONDANSETRON 2 MG/ML
INJECTION INTRAMUSCULAR; INTRAVENOUS AS NEEDED
Status: DISCONTINUED | OUTPATIENT
Start: 2022-08-19 | End: 2022-08-19 | Stop reason: HOSPADM

## 2022-08-19 RX ADMIN — FENTANYL CITRATE 25 MCG: 50 INJECTION INTRAMUSCULAR; INTRAVENOUS at 11:53

## 2022-08-19 RX ADMIN — FENTANYL CITRATE 50 MCG: 50 INJECTION, SOLUTION INTRAMUSCULAR; INTRAVENOUS at 09:06

## 2022-08-19 RX ADMIN — DIPHENHYDRAMINE HYDROCHLORIDE 25 MG: 25 CAPSULE ORAL at 23:21

## 2022-08-19 RX ADMIN — SODIUM CHLORIDE, POTASSIUM CHLORIDE, SODIUM LACTATE AND CALCIUM CHLORIDE: 600; 310; 30; 20 INJECTION, SOLUTION INTRAVENOUS at 08:56

## 2022-08-19 RX ADMIN — OXYCODONE AND ACETAMINOPHEN 2 TABLET: 5; 325 TABLET ORAL at 18:45

## 2022-08-19 RX ADMIN — HYDROMORPHONE HYDROCHLORIDE 0.5 MG: 1 INJECTION, SOLUTION INTRAMUSCULAR; INTRAVENOUS; SUBCUTANEOUS at 11:11

## 2022-08-19 RX ADMIN — ONDANSETRON 4 MG: 2 INJECTION INTRAMUSCULAR; INTRAVENOUS at 09:06

## 2022-08-19 RX ADMIN — LIDOCAINE HYDROCHLORIDE 100 MG: 20 INJECTION, SOLUTION EPIDURAL; INFILTRATION; INTRACAUDAL; PERINEURAL at 09:06

## 2022-08-19 RX ADMIN — ROCURONIUM BROMIDE 40 MG: 10 INJECTION, SOLUTION INTRAVENOUS at 09:06

## 2022-08-19 RX ADMIN — HYDROMORPHONE HYDROCHLORIDE 0.5 MG: 1 INJECTION, SOLUTION INTRAMUSCULAR; INTRAVENOUS; SUBCUTANEOUS at 10:56

## 2022-08-19 RX ADMIN — KETAMINE HYDROCHLORIDE 50 MG: 10 INJECTION INTRAMUSCULAR; INTRAVENOUS at 09:22

## 2022-08-19 RX ADMIN — OXYCODONE AND ACETAMINOPHEN 2 TABLET: 5; 325 TABLET ORAL at 22:56

## 2022-08-19 RX ADMIN — CEFAZOLIN 2 G: 1 INJECTION, POWDER, FOR SOLUTION INTRAMUSCULAR; INTRAVENOUS at 17:35

## 2022-08-19 RX ADMIN — ALBUTEROL SULFATE 2 PUFF: 108 INHALANT RESPIRATORY (INHALATION) at 10:37

## 2022-08-19 RX ADMIN — SODIUM CHLORIDE, POTASSIUM CHLORIDE, SODIUM LACTATE AND CALCIUM CHLORIDE 20 ML/HR: 600; 310; 30; 20 INJECTION, SOLUTION INTRAVENOUS at 07:02

## 2022-08-19 RX ADMIN — SODIUM CHLORIDE 75 ML/HR: 9 INJECTION, SOLUTION INTRAVENOUS at 12:09

## 2022-08-19 RX ADMIN — PROPOFOL 120 MG: 10 INJECTION, EMULSION INTRAVENOUS at 09:06

## 2022-08-19 RX ADMIN — SUGAMMADEX 200 MG: 100 INJECTION, SOLUTION INTRAVENOUS at 10:37

## 2022-08-19 RX ADMIN — FENTANYL CITRATE 50 MCG: 50 INJECTION, SOLUTION INTRAMUSCULAR; INTRAVENOUS at 09:09

## 2022-08-19 RX ADMIN — OXYCODONE AND ACETAMINOPHEN 2 TABLET: 5; 325 TABLET ORAL at 14:07

## 2022-08-19 RX ADMIN — MIDAZOLAM HYDROCHLORIDE 2 MG: 2 INJECTION, SOLUTION INTRAMUSCULAR; INTRAVENOUS at 08:56

## 2022-08-19 RX ADMIN — FENTANYL CITRATE 25 MCG: 50 INJECTION INTRAMUSCULAR; INTRAVENOUS at 11:42

## 2022-08-19 RX ADMIN — HYDROMORPHONE HYDROCHLORIDE 0.5 MG: 1 INJECTION, SOLUTION INTRAMUSCULAR; INTRAVENOUS; SUBCUTANEOUS at 09:40

## 2022-08-19 RX ADMIN — DEXAMETHASONE SODIUM PHOSPHATE 4 MG: 4 INJECTION, SOLUTION INTRA-ARTICULAR; INTRALESIONAL; INTRAMUSCULAR; INTRAVENOUS; SOFT TISSUE at 09:06

## 2022-08-19 RX ADMIN — QUETIAPINE FUMARATE 100 MG: 100 TABLET ORAL at 21:06

## 2022-08-19 RX ADMIN — CEFAZOLIN SODIUM 2 G: 1 INJECTION, POWDER, FOR SOLUTION INTRAMUSCULAR; INTRAVENOUS at 09:06

## 2022-08-19 NOTE — PERIOP NOTES
Patient alert and oriented x4, VS stable, 10/10 right hip pain. Cousin at bedside. Bed in low position, call bell within reach. Pt reports recent hypertensive emergency where she was in the hospital for 2 weeks. Recently pt has had episodes of flushing with pruritus and urticaria that wakes her up in the night and has diarrhea immediately following. \\n\\nWill work up for carcinoid syndrome and pheochromocytoma.\\n\\nPt also reports a burning sensation in her mouth with not evidence of mucosal changes. Will also work Pt up for burning mouth syndrome. Pt is also seeing a dentist on Friday. Detail Level: Simple

## 2022-08-19 NOTE — H&P
History and Physical    Subjective:     Sharlene Covarrubias is a 64 y.o. female who presents today for surgical management of her ventral hernia. I have followed him in the office for this and got a CT scan. She has had 3 ventral hernia repairs in the past in the upper and lower abdomen. Her new hernia is periumbilical.  On CT appears that both the upper and lower hernia repairs are intact and this is likely between the 2 pieces of mesh. She does continue port discomfort at the umbilicus. She denies any nausea vomiting or obstructive symptoms.       Past medical history is significant for history of hypertension bipolar disorder hypercholesterolemia and sciatica  Past Medical History:   Diagnosis Date    Anemia 8/16/2022    Arthritis     Asthma     Bipolar disorder, unspecified ([facility] [address] 01/28/2019    [facility]     Per PCP note    Essential (primary) hypertension 01/28/2019    GERD (gastroesophageal reflux disease)     Hypercholesteremia 01/28/2019    Ill-defined condition     vein    Sciatica 01/28/2019    Sleep apnea     Patient stated does not use a CPAP    Stress incontinence     Varicose veins of both lower extremities     PER PCP NOTE    Ventral hernia       Past Surgical History:   Procedure Laterality Date    HX HERNIA REPAIR      ventral hernia repair times 3 with mesh    HX HYSTERECTOMY      partial    HX ORTHOPAEDIC      Bunionectomy right great toe    HX OTHER SURGICAL      Epiglottis repair due to allergic reaction     Family History   Problem Relation Age of Onset    Hypertension Mother     Diabetes Mother     Hypertension Father       Social History     Tobacco Use    Smoking status: Every Day     Packs/day: 1.00     Years: 21.00     Pack years: 21.00     Types: Cigarettes    Smokeless tobacco: Never   Substance Use Topics    Alcohol use: Yes     Comment: Patient stated has a bourbon drink nightly after dinner       Prior to Admission medications Medication Sig Start Date End Date Taking? Authorizing Provider   nicotine (NICODERM CQ) 14 mg/24 hr patch 1 Patch by TransDERmal route every twenty-four (24) hours for 30 days. 8/16/22 9/15/22 Yes Ketty Hester MD   omeprazole (PRILOSEC) 40 mg capsule Take 1 capsule by mouth once daily 8/15/22  Yes Marielle Schofield NP   QUEtiapine (SEROquel) 25 mg tablet TAKE 1 TABLET BY MOUTH IN THE MORNING FOR 30 DAYS 8/15/22  Yes Michaela Mcintosh NP   albuterol (PROVENTIL HFA, VENTOLIN HFA, PROAIR HFA) 90 mcg/actuation inhaler INHALE 2 PUFFS BY MOUTH EVERY 4 TO 6 HOURS AS NEEDED  Patient taking differently: Take 2 Puffs by inhalation every four (4) hours as needed. INHALE 2 PUFFS BY MOUTH EVERY 4 TO 6 HOURS AS NEEDED 6/28/22  Yes Marielle Schofield NP   lisinopril-hydroCHLOROthiazide (PRINZIDE, ZESTORETIC) 20-25 mg per tablet TAKE 1 TABLET BY MOUTH ONCE DAILY IN THE MORNING  Patient taking differently: Take 1 Tablet by mouth daily. 3/30/22  Yes Marielle Schofield NP   isosorbide mononitrate ER (IMDUR) 30 mg tablet Take 30 mg by mouth in the morning. Yes Provider, Historical   QUEtiapine (SEROquel) 100 mg tablet Take 100 mg by mouth nightly. Provider, Historical     Allergies   Allergen Reactions    Latex Itching    Hismanal Anaphylaxis        Review of Systems:  Constitutional:  Positive for malaise/fatigue. HENT:  Positive for sinus pain and tinnitus. Eyes:  Positive for blurred vision. Respiratory:  Positive for cough, shortness of breath and wheezing. Cardiovascular:  Positive for chest pain and leg swelling. Gastrointestinal:  Positive for abdominal pain. Genitourinary:  Positive for frequency. Musculoskeletal:  Positive for back pain, joint pain and myalgias. Skin:  Positive for rash. Neurological:  Positive for tingling. Psychiatric/Behavioral:  Positive for depression and memory loss. The patient is nervous/anxious and has insomnia.       Objective:     Visit Vitals  /73 (BP 1 Location: Left upper arm, BP Patient Position: At rest)   Pulse 69   Temp 97.8 °F (36.6 °C)   Resp 18   Ht 5' 2.75\" (1.594 m)   Wt 68 kg (150 lb)   SpO2 99%   BMI 26.78 kg/m²         Physical Exam:   Constitutional:       General: She is not in acute distress. Appearance: Normal appearance. She is not ill-appearing. HENT:      Head: Normocephalic and atraumatic. Cardiovascular:      Rate and Rhythm: Normal rate and regular rhythm. Heart sounds: Normal heart sounds. Pulmonary:      Effort: Pulmonary effort is normal.      Breath sounds: Normal breath sounds. Abdominal:         Comments: Hernia at the umbilicus, tender palpation, reducible  Mesh and/or sutures palpable just below the skin at the lower midline incision, very tender to palpation, possible recurrence to the side of the mesh   Musculoskeletal:      Cervical back: Neck supple. Skin:     General: Skin is warm and dry. Neurological:      General: No focal deficit present. Mental Status: She is alert and oriented to person, place, and time. Psychiatric:         Mood and Affect: Mood normal.         Thought Content: Thought content normal.         Judgment: Judgment normal.        Data Review:   Recent Results (from the past 24 hour(s))   POC CHEM8    Collection Time: 08/19/22  6:33 AM   Result Value Ref Range    Glucose,  (H) 74 - 106 mg/dL    Creatinine, POC 0.8 MG/DL    Sodium,  136 - 145 mmol/L    Potassium, POC 4.1 3.5 - 5.5 mmol/L    Calcium, ionized (POC) 1.08 (L) 1.12 - 1.32 mmol/L    Chloride,  MMOL/L   TYPE & SCREEN    Collection Time: 08/19/22  7:11 AM   Result Value Ref Range    Crossmatch Expiration 08/22/2022,2359     ABO/Rh(D) A Positive     Antibody screen Negative        Assessment:     Active Problems:    Recurrent ventral hernia (6/30/2021)        Plan:   I once again reviewed surgery with the patient. I already discussed surgical management of her recurrent hernia with mesh in the office.   I told her that I may have to excise the lower abdominal mesh placement of new mesh to repair both the defect in the lower abdomen and the periumbilical defect. We will try to avoid removing any of her old mesh and repair just the new defect. I reviewed the risk and benefits and the risk of infection, bleeding, injury to other intra-abdominal organs and structures within the small and large bowel given the fact she may have adhesions to the abdominal wall mesh, recurrence, mesh complications. She wishes to proceed and consent is on the chart and her surgery scheduled for this morning.

## 2022-08-19 NOTE — ANESTHESIA POSTPROCEDURE EVALUATION
Procedure(s):  REPAIR RECURRENT VENTRAL ABDOMINAL HERNIA, EXCISION OF ABDOMINAL WALL STITCH. general    Anesthesia Post Evaluation        Patient location during evaluation: PACU  Patient participation: complete - patient cannot participate  Level of consciousness: awake and alert  Pain management: adequate  Airway patency: patent  Anesthetic complications: no  Cardiovascular status: acceptable  Respiratory status: acceptable  Hydration status: acceptable  Post anesthesia nausea and vomiting:  none  Final Post Anesthesia Temperature Assessment:  Normothermia (36.0-37.5 degrees C)      INITIAL Post-op Vital signs:   Vitals Value Taken Time   /87 08/19/22 1115   Temp 36.1 °C (97 °F) 08/19/22 1052   Pulse 66 08/19/22 1117   Resp 14 08/19/22 1117   SpO2 100 % 08/19/22 1117   Vitals shown include unvalidated device data.

## 2022-08-19 NOTE — BRIEF OP NOTE
Brief Postoperative Note    Patient: Cal Luciano  YOB: 1960  MRN: [id number]    Date of Procedure: 8/19/2022     Pre-Op Diagnosis: Ventral hernia without obstruction or gangrene [K43.9]    Post-Op Diagnosis: Same as preoperative diagnosis.       Procedure(s):  REPAIR RECURRENT VENTRAL ABDOMINAL HERNIA, EXCISION OF ABDOMINAL WALL STITCH    Surgeon(s):  Baudilio Duenas MD    Surgical Assistant: None    Anesthesia: General     Estimated Blood Loss (mL): 94GM    Complications: None    Specimens:   ID Type Source Tests Collected by Time Destination   1 : omentum  Preservative Omentum  Baudilio Duenas MD 8/19/2022 8973 Pathology        Implants: * No implants in log *    Drains: * No LDAs found *    Findings: Hernia defect between the patient's upper and lower abdominal prior mesh; suture not at the site of the patient's palpable abnormality in the lower pole of her midline incision where she describes discomfort    Electronically Signed by Genny Ferro MD on 8/19/2022 at 11:22 AM

## 2022-08-19 NOTE — PROGRESS NOTES
Alert and oriented denies pain at this time ambulated in room to bathroom and in hallway with staff. Educated to use call bell for assistance to prevent falls and for pain management.   Verbalized understanding

## 2022-08-19 NOTE — ANESTHESIA PREPROCEDURE EVALUATION
Relevant Problems   NEUROLOGY   (+) Bipolar disorder, unspecified ([facility] [address]      CARDIOVASCULAR   (+) Essential (primary) hypertension      HEMATOLOGY   (+) Anemia       Anesthetic History   No history of anesthetic complications            Review of Systems / Medical History  Patient summary reviewed and pertinent labs reviewed    Pulmonary    COPD    Sleep apnea  Smoker  Asthma        Neuro/Psych   Within defined limits          Comments: Chronic Pain Cardiovascular    Hypertension            Pertinent negatives: No CAD    Comments: LV: Estimated LVEF is 55 - 60%. Normal cavity size, wall thickness and systolic function (ejection fraction normal). Wall motion: normal. Moderate (grade 2) left ventricular diastolic dysfunction. AV: Aortic valve leaflet calcification present.      Normal sinus rhythm   Possible Left atrial enlargement   Nonspecific T wave abnormality   Abnormal ECG   GI/Hepatic/Renal     GERD           Endo/Other        Anemia     Other Findings            Physical Exam    Airway  Mallampati: II    Neck ROM: normal range of motion        Cardiovascular    Rhythm: regular  Rate: normal         Dental    Dentition: Edentulous     Pulmonary    Rhonchi             Abdominal         Other Findings            Anesthetic Plan    ASA: 3  Anesthesia type: general    Monitoring Plan: Continuous noninvasive hemodynamic monitoring      Induction: Intravenous  Anesthetic plan and risks discussed with: Patient

## 2022-08-20 PROCEDURE — 74011250637 HC RX REV CODE- 250/637: Performed by: COLON & RECTAL SURGERY

## 2022-08-20 PROCEDURE — 74011000250 HC RX REV CODE- 250: Performed by: COLON & RECTAL SURGERY

## 2022-08-20 PROCEDURE — 74011250636 HC RX REV CODE- 250/636: Performed by: COLON & RECTAL SURGERY

## 2022-08-20 PROCEDURE — 99024 POSTOP FOLLOW-UP VISIT: CPT | Performed by: COLON & RECTAL SURGERY

## 2022-08-20 PROCEDURE — G0378 HOSPITAL OBSERVATION PER HR: HCPCS

## 2022-08-20 PROCEDURE — 96372 THER/PROPH/DIAG INJ SC/IM: CPT

## 2022-08-20 PROCEDURE — 96376 TX/PRO/DX INJ SAME DRUG ADON: CPT

## 2022-08-20 RX ORDER — HYDROMORPHONE HYDROCHLORIDE 1 MG/ML
0.5 INJECTION, SOLUTION INTRAMUSCULAR; INTRAVENOUS; SUBCUTANEOUS
Status: DISCONTINUED | OUTPATIENT
Start: 2022-08-20 | End: 2022-08-23 | Stop reason: HOSPADM

## 2022-08-20 RX ORDER — ENOXAPARIN SODIUM 100 MG/ML
40 INJECTION SUBCUTANEOUS EVERY 24 HOURS
Status: DISCONTINUED | OUTPATIENT
Start: 2022-08-20 | End: 2022-08-23 | Stop reason: HOSPADM

## 2022-08-20 RX ADMIN — OXYCODONE AND ACETAMINOPHEN 2 TABLET: 5; 325 TABLET ORAL at 07:16

## 2022-08-20 RX ADMIN — ENOXAPARIN SODIUM 40 MG: 100 INJECTION SUBCUTANEOUS at 13:22

## 2022-08-20 RX ADMIN — CEFAZOLIN 2 G: 1 INJECTION, POWDER, FOR SOLUTION INTRAMUSCULAR; INTRAVENOUS at 17:38

## 2022-08-20 RX ADMIN — QUETIAPINE FUMARATE 100 MG: 100 TABLET ORAL at 21:25

## 2022-08-20 RX ADMIN — HYDROCHLOROTHIAZIDE: 25 TABLET ORAL at 08:23

## 2022-08-20 RX ADMIN — CEFAZOLIN 2 G: 1 INJECTION, POWDER, FOR SOLUTION INTRAMUSCULAR; INTRAVENOUS at 01:08

## 2022-08-20 RX ADMIN — OXYCODONE AND ACETAMINOPHEN 2 TABLET: 5; 325 TABLET ORAL at 19:40

## 2022-08-20 RX ADMIN — DIPHENHYDRAMINE HYDROCHLORIDE 25 MG: 25 CAPSULE ORAL at 07:16

## 2022-08-20 RX ADMIN — DIPHENHYDRAMINE HYDROCHLORIDE 25 MG: 25 CAPSULE ORAL at 21:25

## 2022-08-20 RX ADMIN — OXYCODONE AND ACETAMINOPHEN 2 TABLET: 5; 325 TABLET ORAL at 11:17

## 2022-08-20 RX ADMIN — OXYCODONE AND ACETAMINOPHEN 2 TABLET: 5; 325 TABLET ORAL at 15:17

## 2022-08-20 RX ADMIN — DIPHENHYDRAMINE HYDROCHLORIDE 25 MG: 25 CAPSULE ORAL at 15:17

## 2022-08-20 RX ADMIN — ISOSORBIDE MONONITRATE 30 MG: 60 TABLET, EXTENDED RELEASE ORAL at 08:23

## 2022-08-20 RX ADMIN — OXYCODONE AND ACETAMINOPHEN 2 TABLET: 5; 325 TABLET ORAL at 03:05

## 2022-08-20 RX ADMIN — CEFAZOLIN 2 G: 1 INJECTION, POWDER, FOR SOLUTION INTRAMUSCULAR; INTRAVENOUS at 08:23

## 2022-08-20 RX ADMIN — HYDROMORPHONE HYDROCHLORIDE 0.5 MG: 1 INJECTION, SOLUTION INTRAMUSCULAR; INTRAVENOUS; SUBCUTANEOUS at 22:12

## 2022-08-20 NOTE — PROGRESS NOTES
Problem: Pain  Goal: *Control of Pain  Outcome: Progressing Towards Goal  Goal: *PALLIATIVE CARE:  Alleviation of Pain  Outcome: Progressing Towards Goal     Problem: Patient Education: Go to Patient Education Activity  Goal: Patient/Family Education  Outcome: Progressing Towards Goal     Problem: Falls - Risk of  Goal: *Absence of Falls  Description: Document Estrella Alvarez Fall Risk and appropriate interventions in the flowsheet.   Outcome: Progressing Towards Goal  Note: Fall Risk Interventions:            Medication Interventions: Patient to call before getting OOB, Teach patient to arise slowly                   Problem: Patient Education: Go to Patient Education Activity  Goal: Patient/Family Education  Outcome: Progressing Towards Goal

## 2022-08-20 NOTE — PROGRESS NOTES
Patient ambulating in hallway with walker, no sign of acute or otherwise distress. Gait steady and even. Patient denies needs at this time.

## 2022-08-20 NOTE — PROGRESS NOTES
Problem: Pain  Goal: *Control of Pain  Outcome: Progressing Towards Goal     Problem: Patient Education: Go to Patient Education Activity  Goal: Patient/Family Education  Outcome: Progressing Towards Goal     Problem: Falls - Risk of  Goal: *Absence of Falls  Description: Document Issa Dutta Fall Risk and appropriate interventions in the flowsheet.   Outcome: Progressing Towards Goal  Note: Fall Risk Interventions:  Mobility Interventions: Strengthening exercises (ROM-active/passive)         Medication Interventions: Teach patient to arise slowly    Elimination Interventions: Call light in reach

## 2022-08-20 NOTE — PROGRESS NOTES
Progress Note    Patient: Homa Son MRN: [id number]  SSN: [id number]    YOB: 1960  Age: 64 y.o. Sex: female      Admit Date: 8/19/2022    LOS: 0 days     Subjective:   Postoperative day 1  Patient reports she has not passed flatus or had a bowel movement  Reports surgical site pain    Objective:     Vitals:    08/19/22 2002 08/20/22 0328 08/20/22 0720 08/20/22 0802   BP: 138/75 (!) 116/54 (!) 145/73    Pulse: 66 69 63    Resp: 18 18 27    Temp: 98.3 °F (36.8 °C) 98 °F (36.7 °C) 97.3 °F (36.3 °C)    SpO2: 97% 100% 100% 100%   Weight:       Height:            Intake and Output:  Current Shift: No intake/output data recorded. Last three shifts: 08/18 1901 - 08/20 0700  In: 2042.5 [P.O.:500; I.V.:1542.5]  Out: 30     Review of Systems:  ROS     Physical Exam:   Abdomen is soft, appropriately tender, moderately distended, midline incision and lower abdominal incisions both clean and intact    Lab/Data Review:  No results found for this or any previous visit (from the past 24 hour(s)). Assessment:     Active Problems:    Recurrent ventral hernia (6/30/2021)        Plan:   Encourage ambulation  Check labs in a.m.   Awaiting return of bowel function    Signed By: Jesus Lal MD     August 20, 2022

## 2022-08-20 NOTE — PROGRESS NOTES
Medicare Outpatient Observation Notice (MOON)/ [place] Outpatient Observation Notice (Maryan Alvarez) provided to patient/representative with verbal explanation of the notice. Time allotted for questions regarding the notice. Patient /representative provided a completed copy of the MOON/VOON notice. Copy placed on bedside chart. Patient received notification. Signed copy on chart. Patient also expressed some frustration with her care at night, stating that she would contact someone about her night care.

## 2022-08-21 ENCOUNTER — APPOINTMENT (OUTPATIENT)
Dept: GENERAL RADIOLOGY | Age: 62
End: 2022-08-21
Attending: COLON & RECTAL SURGERY
Payer: COMMERCIAL

## 2022-08-21 LAB
ALBUMIN SERPL-MCNC: 3.1 G/DL (ref 3.5–5)
ALBUMIN/GLOB SERPL: 0.9 {RATIO} (ref 1.1–2.2)
ALP SERPL-CCNC: 77 U/L (ref 45–117)
ALT SERPL-CCNC: 14 U/L (ref 12–78)
ANION GAP SERPL CALC-SCNC: 5 MMOL/L (ref 5–15)
AST SERPL W P-5'-P-CCNC: 8 U/L (ref 15–37)
BASOPHILS # BLD: 0 K/UL (ref 0–0.1)
BASOPHILS NFR BLD: 0 % (ref 0–1)
BILIRUB SERPL-MCNC: 0.3 MG/DL (ref 0.2–1)
BUN SERPL-MCNC: 15 MG/DL (ref 6–20)
BUN/CREAT SERPL: 18 (ref 12–20)
CA-I BLD-MCNC: 9.3 MG/DL (ref 8.5–10.1)
CHLORIDE SERPL-SCNC: 98 MMOL/L (ref 97–108)
CO2 SERPL-SCNC: 31 MMOL/L (ref 21–32)
CREAT SERPL-MCNC: 0.82 MG/DL (ref 0.55–1.02)
DIFFERENTIAL METHOD BLD: ABNORMAL
EOSINOPHIL # BLD: 0 K/UL (ref 0–0.4)
EOSINOPHIL NFR BLD: 0 % (ref 0–7)
ERYTHROCYTE [DISTWIDTH] IN BLOOD BY AUTOMATED COUNT: 17.1 % (ref 11.5–14.5)
GLOBULIN SER CALC-MCNC: 3.6 G/DL (ref 2–4)
GLUCOSE SERPL-MCNC: 115 MG/DL (ref 65–100)
HCT VFR BLD AUTO: 32.5 % (ref 35–47)
HGB BLD-MCNC: 9.7 G/DL (ref 11.5–16)
IMM GRANULOCYTES # BLD AUTO: 0 K/UL (ref 0–0.04)
IMM GRANULOCYTES NFR BLD AUTO: 1 % (ref 0–0.5)
LYMPHOCYTES # BLD: 1.6 K/UL (ref 0.8–3.5)
LYMPHOCYTES NFR BLD: 24 % (ref 12–49)
MCH RBC QN AUTO: 22.3 PG (ref 26–34)
MCHC RBC AUTO-ENTMCNC: 29.8 G/DL (ref 30–36.5)
MCV RBC AUTO: 74.7 FL (ref 80–99)
MONOCYTES # BLD: 0.5 K/UL (ref 0–1)
MONOCYTES NFR BLD: 7 % (ref 5–13)
NEUTS SEG # BLD: 4.5 K/UL (ref 1.8–8)
NEUTS SEG NFR BLD: 68 % (ref 32–75)
NRBC # BLD: 0 K/UL (ref 0–0.01)
NRBC BLD-RTO: 0 PER 100 WBC
PLATELET # BLD AUTO: 346 K/UL (ref 150–400)
PMV BLD AUTO: 11.2 FL (ref 8.9–12.9)
POTASSIUM SERPL-SCNC: 3.6 MMOL/L (ref 3.5–5.1)
PROT SERPL-MCNC: 6.7 G/DL (ref 6.4–8.2)
RBC # BLD AUTO: 4.35 M/UL (ref 3.8–5.2)
SODIUM SERPL-SCNC: 134 MMOL/L (ref 136–145)
WBC # BLD AUTO: 6.7 K/UL (ref 3.6–11)

## 2022-08-21 PROCEDURE — 96376 TX/PRO/DX INJ SAME DRUG ADON: CPT

## 2022-08-21 PROCEDURE — G0378 HOSPITAL OBSERVATION PER HR: HCPCS

## 2022-08-21 PROCEDURE — 74018 RADEX ABDOMEN 1 VIEW: CPT

## 2022-08-21 PROCEDURE — 74011250637 HC RX REV CODE- 250/637: Performed by: COLON & RECTAL SURGERY

## 2022-08-21 PROCEDURE — 74011000250 HC RX REV CODE- 250: Performed by: COLON & RECTAL SURGERY

## 2022-08-21 PROCEDURE — 85025 COMPLETE CBC W/AUTO DIFF WBC: CPT

## 2022-08-21 PROCEDURE — 74011250636 HC RX REV CODE- 250/636: Performed by: COLON & RECTAL SURGERY

## 2022-08-21 PROCEDURE — 96372 THER/PROPH/DIAG INJ SC/IM: CPT

## 2022-08-21 PROCEDURE — 36415 COLL VENOUS BLD VENIPUNCTURE: CPT

## 2022-08-21 PROCEDURE — 80053 COMPREHEN METABOLIC PANEL: CPT

## 2022-08-21 RX ORDER — FACIAL-BODY WIPES
10 EACH TOPICAL ONCE
Status: COMPLETED | OUTPATIENT
Start: 2022-08-21 | End: 2022-08-21

## 2022-08-21 RX ORDER — FACIAL-BODY WIPES
10 EACH TOPICAL DAILY PRN
Status: DISCONTINUED | OUTPATIENT
Start: 2022-08-21 | End: 2022-08-21

## 2022-08-21 RX ADMIN — CEFAZOLIN 2 G: 1 INJECTION, POWDER, FOR SOLUTION INTRAMUSCULAR; INTRAVENOUS at 16:23

## 2022-08-21 RX ADMIN — OXYCODONE AND ACETAMINOPHEN 2 TABLET: 5; 325 TABLET ORAL at 21:05

## 2022-08-21 RX ADMIN — OXYCODONE AND ACETAMINOPHEN 2 TABLET: 5; 325 TABLET ORAL at 16:25

## 2022-08-21 RX ADMIN — DIPHENHYDRAMINE HYDROCHLORIDE 25 MG: 25 CAPSULE ORAL at 16:26

## 2022-08-21 RX ADMIN — QUETIAPINE FUMARATE 100 MG: 100 TABLET ORAL at 21:05

## 2022-08-21 RX ADMIN — ISOSORBIDE MONONITRATE 30 MG: 60 TABLET, EXTENDED RELEASE ORAL at 08:06

## 2022-08-21 RX ADMIN — CEFAZOLIN 2 G: 1 INJECTION, POWDER, FOR SOLUTION INTRAMUSCULAR; INTRAVENOUS at 08:06

## 2022-08-21 RX ADMIN — BISACODYL 10 MG: 10 SUPPOSITORY RECTAL at 16:17

## 2022-08-21 RX ADMIN — CEFAZOLIN 2 G: 1 INJECTION, POWDER, FOR SOLUTION INTRAMUSCULAR; INTRAVENOUS at 00:36

## 2022-08-21 RX ADMIN — HYDROMORPHONE HYDROCHLORIDE 0.5 MG: 1 INJECTION, SOLUTION INTRAMUSCULAR; INTRAVENOUS; SUBCUTANEOUS at 22:19

## 2022-08-21 RX ADMIN — DIPHENHYDRAMINE HYDROCHLORIDE 25 MG: 25 CAPSULE ORAL at 10:26

## 2022-08-21 RX ADMIN — OXYCODONE AND ACETAMINOPHEN 2 TABLET: 5; 325 TABLET ORAL at 05:49

## 2022-08-21 RX ADMIN — OXYCODONE AND ACETAMINOPHEN 2 TABLET: 5; 325 TABLET ORAL at 10:26

## 2022-08-21 RX ADMIN — HYDROCHLOROTHIAZIDE: 25 TABLET ORAL at 08:06

## 2022-08-21 RX ADMIN — ENOXAPARIN SODIUM 40 MG: 100 INJECTION SUBCUTANEOUS at 12:07

## 2022-08-21 RX ADMIN — OXYCODONE AND ACETAMINOPHEN 2 TABLET: 5; 325 TABLET ORAL at 00:47

## 2022-08-21 NOTE — PROGRESS NOTES
Progress Note    Patient: Deepa Hale MRN: [id number]  SSN: [id number]    YOB: 1960  Age: 64 y.o. Sex: female      Admit Date: 8/19/2022    LOS: 0 days     Subjective:   Postoperative day 2  Patient seen in bed  Denies flatus or bowel movement    Objective:     Vitals:    08/20/22 1942 08/20/22 2040 08/21/22 0347 08/21/22 0710   BP: (!) 147/85  (!) 103/58 104/61   Pulse: 85  80 78   Resp: 20 18 20   Temp: 98.3 °F (36.8 °C)  99.1 °F (37.3 °C) 98.1 °F (36.7 °C)   SpO2: 100% 100% 98% 100%   Weight:       Height:            Intake and Output:  Current Shift: No intake/output data recorded. Last three shifts: 08/19 1901 - 08/21 0700  In: 1642.5 [P.O.:700; I.V.:942.5]  Out: -     Review of Systems:  ROS     Physical Exam:    Abdomen is soft, appropriately tender, moderately distended, incisions clean and intact    Lab/Data Review:  Recent Results (from the past 24 hour(s))   CBC WITH AUTOMATED DIFF    Collection Time: 08/21/22  7:06 AM   Result Value Ref Range    WBC 6.7 3.6 - 11.0 K/uL    RBC 4.35 3.80 - 5.20 M/uL    HGB 9.7 (L) 11.5 - 16.0 g/dL    HCT 32.5 (L) 35.0 - 47.0 %    MCV 74.7 (L) 80.0 - 99.0 FL    MCH 22.3 (L) 26.0 - 34.0 PG    MCHC 29.8 (L) 30.0 - 36.5 g/dL    RDW 17.1 (H) 11.5 - 14.5 %    PLATELET 863 513 - 714 K/uL    MPV 11.2 8.9 - 12.9 FL    NRBC 0.0 0.0  WBC    ABSOLUTE NRBC 0.00 0.00 - 0.01 K/uL    NEUTROPHILS 68 32 - 75 %    LYMPHOCYTES 24 12 - 49 %    MONOCYTES 7 5 - 13 %    EOSINOPHILS 0 0 - 7 %    BASOPHILS 0 0 - 1 %    IMMATURE GRANULOCYTES 1 (H) 0 - 0.5 %    ABS. NEUTROPHILS 4.5 1.8 - 8.0 K/UL    ABS. LYMPHOCYTES 1.6 0.8 - 3.5 K/UL    ABS. MONOCYTES 0.5 0.0 - 1.0 K/UL    ABS. EOSINOPHILS 0.0 0.0 - 0.4 K/UL    ABS. BASOPHILS 0.0 0.0 - 0.1 K/UL    ABS. IMM.  GRANS. 0.0 0.00 - 0.04 K/UL    DF AUTOMATED     METABOLIC PANEL, COMPREHENSIVE    Collection Time: 08/21/22  7:06 AM   Result Value Ref Range    Sodium 134 (L) 136 - 145 mmol/L    Potassium 3.6 3.5 - 5.1 mmol/L    Chloride 98 97 - 108 mmol/L    CO2 31 21 - 32 mmol/L    Anion gap 5 5 - 15 mmol/L    Glucose 115 (H) 65 - 100 mg/dL    BUN 15 6 - 20 mg/dL    Creatinine 0.82 0.55 - 1.02 mg/dL    BUN/Creatinine ratio 18 12 - 20      GFR est AA >60 >60 ml/min/1.73m2    GFR est non-AA >60 >60 ml/min/1.73m2    Calcium 9.3 8.5 - 10.1 mg/dL    Bilirubin, total 0.3 0.2 - 1.0 mg/dL    AST (SGOT) 8 (L) 15 - 37 U/L    ALT (SGPT) 14 12 - 78 U/L    Alk.  phosphatase 77 45 - 117 U/L    Protein, total 6.7 6.4 - 8.2 g/dL    Albumin 3.1 (L) 3.5 - 5.0 g/dL    Globulin 3.6 2.0 - 4.0 g/dL    A-G Ratio 0.9 (L) 1.1 - 2.2            Assessment:     Active Problems:    Recurrent ventral hernia (6/30/2021)        Plan:   Continue diet  Courage out of bed ambulation  Plan discharge tomorrow    Signed By: Nicky oGod MD     August 21, 2022

## 2022-08-21 NOTE — PROGRESS NOTES
Problem: Pain  Goal: *Control of Pain  Outcome: Progressing Towards Goal  Goal: *PALLIATIVE CARE:  Alleviation of Pain  Outcome: Progressing Towards Goal     Problem: Falls - Risk of  Goal: *Absence of Falls  Description: Document Michelle Fall Risk and appropriate interventions in the flowsheet.   Outcome: Progressing Towards Goal  Note: Fall Risk Interventions:  Mobility Interventions: Strengthening exercises (ROM-active/passive)         Medication Interventions: Teach patient to arise slowly    Elimination Interventions: Call light in reach

## 2022-08-22 PROCEDURE — 99024 POSTOP FOLLOW-UP VISIT: CPT | Performed by: COLON & RECTAL SURGERY

## 2022-08-22 PROCEDURE — 96372 THER/PROPH/DIAG INJ SC/IM: CPT

## 2022-08-22 PROCEDURE — 74011250637 HC RX REV CODE- 250/637: Performed by: COLON & RECTAL SURGERY

## 2022-08-22 PROCEDURE — 74011250636 HC RX REV CODE- 250/636: Performed by: COLON & RECTAL SURGERY

## 2022-08-22 PROCEDURE — 74011000250 HC RX REV CODE- 250: Performed by: COLON & RECTAL SURGERY

## 2022-08-22 PROCEDURE — 96376 TX/PRO/DX INJ SAME DRUG ADON: CPT

## 2022-08-22 PROCEDURE — G0378 HOSPITAL OBSERVATION PER HR: HCPCS

## 2022-08-22 RX ADMIN — DIPHENHYDRAMINE HYDROCHLORIDE 25 MG: 25 CAPSULE ORAL at 09:29

## 2022-08-22 RX ADMIN — QUETIAPINE FUMARATE 100 MG: 100 TABLET ORAL at 22:17

## 2022-08-22 RX ADMIN — HYDROCHLOROTHIAZIDE: 25 TABLET ORAL at 09:33

## 2022-08-22 RX ADMIN — CEFAZOLIN 2 G: 1 INJECTION, POWDER, FOR SOLUTION INTRAMUSCULAR; INTRAVENOUS at 01:03

## 2022-08-22 RX ADMIN — DIPHENHYDRAMINE HYDROCHLORIDE 25 MG: 25 CAPSULE ORAL at 01:09

## 2022-08-22 RX ADMIN — OXYCODONE AND ACETAMINOPHEN 2 TABLET: 5; 325 TABLET ORAL at 09:29

## 2022-08-22 RX ADMIN — ISOSORBIDE MONONITRATE 30 MG: 60 TABLET, EXTENDED RELEASE ORAL at 09:33

## 2022-08-22 RX ADMIN — CEFAZOLIN 2 G: 1 INJECTION, POWDER, FOR SOLUTION INTRAMUSCULAR; INTRAVENOUS at 09:33

## 2022-08-22 RX ADMIN — OXYCODONE AND ACETAMINOPHEN 2 TABLET: 5; 325 TABLET ORAL at 18:16

## 2022-08-22 RX ADMIN — OXYCODONE AND ACETAMINOPHEN 2 TABLET: 5; 325 TABLET ORAL at 13:34

## 2022-08-22 RX ADMIN — OXYCODONE AND ACETAMINOPHEN 2 TABLET: 5; 325 TABLET ORAL at 22:17

## 2022-08-22 RX ADMIN — CEFAZOLIN 2 G: 1 INJECTION, POWDER, FOR SOLUTION INTRAMUSCULAR; INTRAVENOUS at 18:16

## 2022-08-22 RX ADMIN — OXYCODONE AND ACETAMINOPHEN 2 TABLET: 5; 325 TABLET ORAL at 01:03

## 2022-08-22 RX ADMIN — ENOXAPARIN SODIUM 40 MG: 100 INJECTION SUBCUTANEOUS at 13:32

## 2022-08-22 NOTE — PROGRESS NOTES
At 2100 patient walking around room, laughing, good mood, asking for her pain pills. Says, \"My pain is a 7 because I want 2 pills. And I want the Dolomine, too elver I want to try to poop again. \" After a few questions figured out that she wanted the Dilaudid at the same time as the percocet. I stated I cannot give them at the same time, that she has to wait at least an hour, that it is policy because it is a very strong medication-ten times stronger than Morphine even is, it is an opioid like morphine and heroin and people even seda named it 'hospital heroin' because of how strong it is and that is why your doctor only wants you to get this for breakthrough pain 7-10 if you are still in that amount of pain an hour after taking percocet. So, she says, well give it to me in an hour then I'll get up and strain to have another bowel movement. I told her I'd prefer to give it to her after she gets back into bed from trying to have her bowel movement because the side effects are dizziness and I don't want her to fall or pass out on the toilet because she wanted to [place] and push more out. \" And I told her it causes constipation as well, so to try her bowel movement before taking the Dilaudid and then call me for the Dilaudid if she is having pain 7-10 afterwards. She said she is going to have the Dilaudid because her doctor said she could. At this point I realized she is not understanding pain scales and just wants the medication. Stated she got sleep last night after taking it and when she told her doctor that  he told her she could have it tonight, so she is going to take it. I said well call me if your pain is 7-10. She called at 2215 for the medication and already had Manfred Whitley the Nursing Supervisor in the room with her and was yelling that I didn't come back in an hour. I stated it has just been an hour and I brought her the medication as soon as she called.   I stated I can give it to you for a pain of 7-10, so can you tell me your pain level. She stated \"With you it is a 9.\" Then stated I called her a street person and was withholding her medication. I tried to inform her that I did not call her any such names and only tried to keep her safe and that I cannot give her both medications at the same, they have to be at least an hour apart and it has just now been an hour. Patient was given her medication and kept yelling at me, I said I just want to keep you safe. So, then she started yelling that I was talking to her like she was a child and she just wanted to go to bed and we said aracely.

## 2022-08-22 NOTE — PROGRESS NOTES
Progress Note    Patient: Nani Browne MRN: [id number]  SSN: [id number]    YOB: 1960  Age: 64 y.o. Sex: female      Admit Date: 8/19/2022    LOS: 0 days     Subjective:   Postoperative day 3  Patient seen in bed  Has had several bowel movements and passing flatus  Denies nausea vomiting    Objective:     Vitals:    08/21/22 2050 08/22/22 0228 08/22/22 0738 08/22/22 1540   BP:  (!) 146/81 124/79 123/74   Pulse:  75 87 82   Resp:  18 18 18   Temp:  98.2 °F (36.8 °C) 98.3 °F (36.8 °C) 98.4 °F (36.9 °C)   SpO2: 98%  97% 96%   Weight:       Height:            Intake and Output:  Current Shift: No intake/output data recorded. Last three shifts: 08/20 1901 - 08/22 0700  In: 400 [P.O.:400]  Out: -     Review of Systems:  ROS     Physical Exam:   Abdomen is soft, probably tender, mildly distended, incisions clean and intact    Lab/Data Review:  No results found for this or any previous visit (from the past 24 hour(s)). Assessment:     Active Problems:    Recurrent ventral hernia (6/30/2021)        Plan:   Doing well  Continue abdominal binder  Plan discharge home tomorrow a.m.     Signed By: Akilah Mccarthy MD     August 22, 2022

## 2022-08-22 NOTE — PROGRESS NOTES
CM reviewed chart. Per attending, likely discharge home self today. Will continue to follow for needs.

## 2022-08-22 NOTE — PROGRESS NOTES
Problem: Pain  Goal: *Control of Pain  8/21/2022 2126 by Madie Gomez RN  Outcome: Progressing Towards Goal  8/21/2022 2123 by Madie Gomez RN  Outcome: Progressing Towards Goal

## 2022-08-23 VITALS
SYSTOLIC BLOOD PRESSURE: 128 MMHG | BODY MASS INDEX: 26.58 KG/M2 | DIASTOLIC BLOOD PRESSURE: 82 MMHG | RESPIRATION RATE: 18 BRPM | OXYGEN SATURATION: 97 % | WEIGHT: 150 LBS | TEMPERATURE: 98.8 F | HEIGHT: 63 IN | HEART RATE: 83 BPM

## 2022-08-23 LAB
GLUCOSE BLD STRIP.AUTO-MCNC: 126 MG/DL (ref 65–117)
PERFORMED BY, TECHID: ABNORMAL

## 2022-08-23 PROCEDURE — 74011250637 HC RX REV CODE- 250/637: Performed by: COLON & RECTAL SURGERY

## 2022-08-23 PROCEDURE — 49566 PR REPAIR RECURR INCIS HERNIA,STRANG: CPT | Performed by: COLON & RECTAL SURGERY

## 2022-08-23 PROCEDURE — G0378 HOSPITAL OBSERVATION PER HR: HCPCS

## 2022-08-23 PROCEDURE — 74011000250 HC RX REV CODE- 250: Performed by: COLON & RECTAL SURGERY

## 2022-08-23 PROCEDURE — 96376 TX/PRO/DX INJ SAME DRUG ADON: CPT

## 2022-08-23 PROCEDURE — 74011250636 HC RX REV CODE- 250/636: Performed by: COLON & RECTAL SURGERY

## 2022-08-23 PROCEDURE — 82962 GLUCOSE BLOOD TEST: CPT

## 2022-08-23 RX ORDER — OXYCODONE AND ACETAMINOPHEN 5; 325 MG/1; MG/1
1 TABLET ORAL
Qty: 30 TABLET | Refills: 0 | Status: SHIPPED | OUTPATIENT
Start: 2022-08-23 | End: 2022-08-30

## 2022-08-23 RX ADMIN — OXYCODONE AND ACETAMINOPHEN 2 TABLET: 5; 325 TABLET ORAL at 07:32

## 2022-08-23 RX ADMIN — CEFAZOLIN 2 G: 1 INJECTION, POWDER, FOR SOLUTION INTRAMUSCULAR; INTRAVENOUS at 03:08

## 2022-08-23 RX ADMIN — OXYCODONE AND ACETAMINOPHEN 2 TABLET: 5; 325 TABLET ORAL at 03:08

## 2022-08-23 NOTE — DISCHARGE INSTRUCTIONS
No lifting greater than 10 pounds, no strenuous activity  Wear abdominal binder  May shower, no tub baths  Follow-up my office 2 weeks

## 2022-08-23 NOTE — PROGRESS NOTES
CM reviewed chart. Orders for discharge home self care. No needs from case management. Discharge plan of care/case management plan validated with provider discharge order.

## 2022-08-23 NOTE — DISCHARGE SUMMARY
Admit date: 8/19/2022   Admitting Provider: Sandra Marrero MD    Discharge date: 8/23/2022  Discharging Provider: Sandra Marrero MD      * Admission Diagnoses: Ventral hernia without obstruction or gangrene [K43.9]; Recurrent ventral hernia [K43.2]    * Discharge Diagnoses: Same as admission diagnosis  Hospital Problems as of 8/23/2022 Date Reviewed: 8/7/2022            Codes Class Noted - Resolved POA    Recurrent ventral hernia ICD-10-CM: K43.2  ICD-9-CM: 553.21  6/30/2021 - Present Unknown           * Hospital Course: Patient is 60-year-old -American female who was admitted on August 19, 2022 for elective repair of recurrent ventral abdominal hernia and excision of abdominal wall stitch. Her surgery was uneventful for full operative details refer to operative notes. Postoperatively she did well. She is slowly advancing her diet on the day of discharge was tolerating a regular diet for over 24 hours. She was having regular bowel movements and passing flatus. Her pain is well managed with oral analgesics. She was discharged instructions to avoid heavy lifting and strenuous activity. She will follow-up in my office in 2 weeks. * Procedures:  Procedure(s):  REPAIR RECURRENT VENTRAL ABDOMINAL HERNIA, EXCISION OF ABDOMINAL WALL STITCH      Consults: None    Discharge Exam:  Physical Exam  Constitutional:       General: She is not in acute distress. Appearance: She is obese. She is not ill-appearing. HENT:      Head: Normocephalic and atraumatic. Cardiovascular:      Rate and Rhythm: Normal rate and regular rhythm. Heart sounds: Normal heart sounds. Pulmonary:      Effort: Pulmonary effort is normal.      Breath sounds: Normal breath sounds. Abdominal:      General: There is no distension. Palpations: Abdomen is soft. Tenderness: There is abdominal tenderness (Appropriately tender at surgical site).       Comments: Midline incision and small lower abdominal incision both clean and intact, no evidence of recurrent or persistent hernia at rest or on Valsalva   Musculoskeletal:         General: Normal range of motion. Cervical back: Normal range of motion and neck supple. Skin:     General: Skin is warm. Neurological:      General: No focal deficit present. Mental Status: She is alert and oriented to person, place, and time. Psychiatric:         Mood and Affect: Mood normal.         Thought Content: Thought content normal.         Judgment: Judgment normal.        * Discharge Condition: good  * Disposition: Home    Discharge Medications:  Current Discharge Medication List        START taking these medications    Details   oxyCODONE-acetaminophen (PERCOCET) 5-325 mg per tablet Take 1 Tablet by mouth every four (4) hours as needed for Pain for up to 7 days. Max Daily Amount: 6 Tablets. Qty: 30 Tablet, Refills: 0  Start date: 8/23/2022, End date: 8/30/2022    Associated Diagnoses: Recurrent ventral hernia           CONTINUE these medications which have NOT CHANGED    Details   nicotine (NICODERM CQ) 14 mg/24 hr patch 1 Patch by TransDERmal route every twenty-four (24) hours for 30 days.   Qty: 30 Patch, Refills: 0      omeprazole (PRILOSEC) 40 mg capsule Take 1 capsule by mouth once daily  Qty: 90 Capsule, Refills: 0    Associated Diagnoses: Gastroesophageal reflux disease without esophagitis      !! QUEtiapine (SEROquel) 25 mg tablet TAKE 1 TABLET BY MOUTH IN THE MORNING FOR 30 DAYS  Qty: 30 Tablet, Refills: 0      albuterol (PROVENTIL HFA, VENTOLIN HFA, PROAIR HFA) 90 mcg/actuation inhaler INHALE 2 PUFFS BY MOUTH EVERY 4 TO 6 HOURS AS NEEDED  Qty: 9 g, Refills: 2    Associated Diagnoses: Chronic obstructive pulmonary disease, unspecified COPD type (HCC)      lisinopril-hydroCHLOROthiazide (PRINZIDE, ZESTORETIC) 20-25 mg per tablet TAKE 1 TABLET BY MOUTH ONCE DAILY IN THE MORNING  Qty: 90 Tablet, Refills: 0      isosorbide mononitrate ER (IMDUR) 30 mg tablet Take 30 mg by mouth in the morning.      !! QUEtiapine (SEROquel) 100 mg tablet Take 100 mg by mouth nightly. !! - Potential duplicate medications found. Please discuss with provider. * Follow-up Care/Patient Instructions:   Activity: No lifting greater than 10 pounds, no strenuous activity; may shower, no tub baths    Diet: Regular Diet    Wound Care: Keep wound clean and dry    Follow-up Information       Follow up With Specialties Details Why Contact Info    Genaro Barros MD Colon and Rectal Surgery Follow up in 2 week(s)  [facility]. [id number]  [address] [place] [address], [address] Nurse Practitioner   [address]  [address]  [phone]              Signed:  Armani Braon MD  8/23/2022  7:32 AM

## 2022-08-23 NOTE — PROGRESS NOTES
Problem: Pain  Goal: *Control of Pain  Outcome: Resolved/Met  Goal: *PALLIATIVE CARE:  Alleviation of Pain  Outcome: Resolved/Met     Problem: Patient Education: Go to Patient Education Activity  Goal: Patient/Family Education  Outcome: Resolved/Met     Problem: Falls - Risk of  Goal: *Absence of Falls  Description: Document Michelle Fall Risk and appropriate interventions in the flowsheet.   Outcome: Resolved/Met     Problem: Patient Education: Go to Patient Education Activity  Goal: Patient/Family Education  Outcome: Resolved/Met

## 2022-08-24 ENCOUNTER — TELEPHONE (OUTPATIENT)
Dept: PRIMARY CARE CLINIC | Age: 62
End: 2022-08-24

## 2022-08-24 ENCOUNTER — TELEPHONE (OUTPATIENT)
Dept: SURGERY | Age: 62
End: 2022-08-24

## 2022-08-24 NOTE — TELEPHONE ENCOUNTER
Care Transitions Initial Follow Up Call    Outreach made within 2 business days of discharge: Yes    Patient: Debbie Johnson Patient : 1960   MRN: [id number]  Reason for Admission: Surgery for hernia per patient. Discharge Date: 22       Spoke with: Patient. Discharge department/facility: [facility]    TCM Interactive Patient Contact:  Was patient able to fill all prescriptions: Yes  Was patient instructed to bring all medications to the follow-up visit: Yes  Is patient taking all medications as directed in the discharge summary? Yes  Does patient understand their discharge instructions: Yes  Does patient have questions or concerns that need addressed prior to 7-14 day follow up office visit: No    Scheduled appointment with PCP within 7-14 days. Patient stated she did not want to make an appointment with JAILYN Smith NP yet because she cannot  move around too good so she will call us back when she wants to come in.     Follow Up  Future Appointments   Date Time Provider Anni Clancy   2022  1:15 PM MD DONELL JacobsP ROSALIA ANGEL   2022 11:30 AM Rick Nesbitt NP SPCPE BS JEANNE Khoury LPN

## 2022-08-24 NOTE — TELEPHONE ENCOUNTER
Pt called and she is in severe pain following a recent surgery . She stated she has contacted Dr Micaela Mason office but she states the medication that was given is not working. She does not wish to go to the ED. Pt states the pain is unbearable and would like to speak with someone in regards to what she should do.  Please call pt

## 2022-08-24 NOTE — TELEPHONE ENCOUNTER
Ms Sourav Davila called this morning and stated she had a painful night last night and the pain medication that Dr Craig Cochran prescribed her isn't working at all, she had to take 3 throughout the night because the pain was so bad. I advised her I would send a message to his nurse to let her know what was going on and have someone call her back.  [phone], thanks

## 2022-08-24 NOTE — TELEPHONE ENCOUNTER
Spoke with  he states that he cannot prescribe anything stronger. She will need to make those last and can discuss with him at her follow up appointment. I called and made  aware.

## 2022-08-30 ENCOUNTER — TELEPHONE (OUTPATIENT)
Dept: SURGERY | Age: 62
End: 2022-08-30

## 2022-08-30 NOTE — TELEPHONE ENCOUNTER
Ms Yoon Press called this afternoon and wanted to know her discharge orders, what she can and can not drink and eat.  Please call patient back [phone]

## 2022-08-31 NOTE — TELEPHONE ENCOUNTER
Spoke with  she states that she ate pizza the other night and suffered from it. I informed her that tomato sauce has a lot of acid in it so it can still trigger reflux. She states she has been taking her omeprazole to help as well. She would like to know if she can drink a beer. I told her that I would need to discuss this with  and get back with her.

## 2022-08-31 NOTE — OP NOTES
Operative Note    Patient: Neri Pod  YOB: 1960  MRN: [id number]    Date of Procedure: 8/19/2022     Pre-Op Diagnosis: Ventral hernia without obstruction or gangrene [K43.9]    Post-Op Diagnosis: Same as preoperative diagnosis. Procedure(s):  REPAIR RECURRENT VENTRAL ABDOMINAL HERNIA, EXCISION OF ABDOMINAL WALL STITCH    Surgeon(s):  Maximino Gomez MD    Surgical Assistant: None    Anesthesia: General     Estimated Blood Loss (mL):  60TK    Complications: None    Specimens:   ID Type Source Tests Collected by Time Destination   1 : omentum  Preservative Omentum  Maixmino Gomez MD 8/19/2022 7585 Pathology        Implants: * No implants in log *    Drains: * No LDAs found *    Findings: Hernia defect between the patient's upper and lower abdominal prior mesh; suture knot at the site of the patient's palpable abnormality in the lower pole of her midline incision where she describes discomfort    Detailed Description of Procedure: The patient was brought to the operating room and positioned on the OR table in the supine position. Following the induction of general tracheal anesthesia the abdomen was prepped and draped out in standard sterile fashion. A surgical timeout was then performed at which time the patient's identity and surgical procedure were once again confirmed. An incision was made the midline overlying the patient's known hernia defect. This was taken down through subcutaneous tissues with electrocautery. The hernia sac was encountered and opened. There was omentum that was incarcerated within it and this was transected along with the hernia sac and passed the specimen. The defect was between the patient's upper and lower mesh. The patient had a upper abdominal hernia repair with mesh as well as a lower hernia repair with mesh. Given the location and the laxity of her abdominal wall decision was made to proceed with a primary closure.   Myofascial flaps were elevated in all directions to allow for tension-free closure. #1 PDS suture was used to close the fascia. The umbilicus was then tacked down and 3-0 Vicryl suture was used to close the Nai's fascia and 3-0 Vicryl deep dermal sutures and 4-0 Monocryl subcu suture was used for the skin. Dermabond dressings applied. With internal attention to the lower pole of the patient's incision where there was a suture knot we will just below the skin and causing irritation and pain to the patient. This was excised and the skin incision closed with 4-0 Monocryl subcuticular suture and Dermabond dressing. 0.5% Marcaine was infiltrated at both sites. At this point the procedure was terminated. Awakened, extubated, taken to recovery in stable condition. All counts were correct at the close of the case.     Electronically Signed by Radha Dong MD on 8/31/2022 at 3:28 PM

## 2022-09-06 ENCOUNTER — TELEPHONE (OUTPATIENT)
Dept: SURGERY | Age: 62
End: 2022-09-06

## 2022-09-06 NOTE — TELEPHONE ENCOUNTER
Patient called again regarding instructions. I told her we are waiting for Dr. Wili Stone to get here to tell us his instructions.

## 2022-09-06 NOTE — TELEPHONE ENCOUNTER
Patient called today regarding her appointment today. She does not have transportation today. She would like to be seen in the [facility] office on next available appointment. I did not see any opening for her. Please call her regarding her appointment.  [phone]

## 2022-09-06 NOTE — TELEPHONE ENCOUNTER
Spoke with . She states that her cousin Chasity Clemente has passed away. He was supposed to bring her to the appointment today. She states that she has no one to bring her so she would like for me to speak with  about adding her to the scheduled on Monday.

## 2022-09-12 ENCOUNTER — OFFICE VISIT (OUTPATIENT)
Dept: SURGERY | Age: 62
End: 2022-09-12
Payer: COMMERCIAL

## 2022-09-12 VITALS
WEIGHT: 153.4 LBS | OXYGEN SATURATION: 98 % | DIASTOLIC BLOOD PRESSURE: 88 MMHG | TEMPERATURE: 97.8 F | BODY MASS INDEX: 28.23 KG/M2 | HEART RATE: 86 BPM | SYSTOLIC BLOOD PRESSURE: 142 MMHG | RESPIRATION RATE: 18 BRPM | HEIGHT: 62 IN

## 2022-09-12 DIAGNOSIS — K43.2 RECURRENT VENTRAL HERNIA: Primary | ICD-10-CM

## 2022-09-12 PROCEDURE — 99024 POSTOP FOLLOW-UP VISIT: CPT | Performed by: COLON & RECTAL SURGERY

## 2022-09-28 ENCOUNTER — OFFICE VISIT (OUTPATIENT)
Dept: PRIMARY CARE CLINIC | Age: 62
End: 2022-09-28
Payer: COMMERCIAL

## 2022-09-28 VITALS
HEIGHT: 62 IN | WEIGHT: 155 LBS | OXYGEN SATURATION: 99 % | TEMPERATURE: 97.6 F | BODY MASS INDEX: 28.52 KG/M2 | RESPIRATION RATE: 18 BRPM | HEART RATE: 81 BPM | DIASTOLIC BLOOD PRESSURE: 75 MMHG | SYSTOLIC BLOOD PRESSURE: 128 MMHG

## 2022-09-28 DIAGNOSIS — I10 ESSENTIAL (PRIMARY) HYPERTENSION: ICD-10-CM

## 2022-09-28 DIAGNOSIS — Z98.890 S/P HERNIA REPAIR: ICD-10-CM

## 2022-09-28 DIAGNOSIS — Z87.19 S/P HERNIA REPAIR: ICD-10-CM

## 2022-09-28 DIAGNOSIS — I83.813 VARICOSE VEINS OF BILATERAL LOWER EXTREMITIES WITH PAIN: ICD-10-CM

## 2022-09-28 DIAGNOSIS — K21.9 GASTROESOPHAGEAL REFLUX DISEASE WITHOUT ESOPHAGITIS: ICD-10-CM

## 2022-09-28 DIAGNOSIS — Z23 NEEDS FLU SHOT: ICD-10-CM

## 2022-09-28 DIAGNOSIS — F31.60 MIXED BIPOLAR DISORDER (HCC): ICD-10-CM

## 2022-09-28 DIAGNOSIS — D50.9 IRON DEFICIENCY ANEMIA, UNSPECIFIED IRON DEFICIENCY ANEMIA TYPE: ICD-10-CM

## 2022-09-28 DIAGNOSIS — F17.200 TOBACCO DEPENDENCE: ICD-10-CM

## 2022-09-28 DIAGNOSIS — F31.60 MIXED BIPOLAR DISORDER (HCC): Primary | ICD-10-CM

## 2022-09-28 DIAGNOSIS — J44.9 CHRONIC OBSTRUCTIVE PULMONARY DISEASE, UNSPECIFIED COPD TYPE (HCC): Primary | ICD-10-CM

## 2022-09-28 PROCEDURE — 90686 IIV4 VACC NO PRSV 0.5 ML IM: CPT | Performed by: NURSE PRACTITIONER

## 2022-09-28 PROCEDURE — 99214 OFFICE O/P EST MOD 30 MIN: CPT | Performed by: NURSE PRACTITIONER

## 2022-09-28 RX ORDER — QUETIAPINE FUMARATE 25 MG/1
TABLET, FILM COATED ORAL
Qty: 90 TABLET | Refills: 0 | Status: SHIPPED | OUTPATIENT
Start: 2022-09-28

## 2022-09-28 RX ORDER — QUETIAPINE FUMARATE 100 MG/1
100 TABLET, FILM COATED ORAL
Qty: 90 TABLET | Refills: 1 | Status: CANCELLED | OUTPATIENT
Start: 2022-09-28

## 2022-09-28 RX ORDER — QUETIAPINE FUMARATE 25 MG/1
25 TABLET, FILM COATED ORAL DAILY
Qty: 90 TABLET | Refills: 1 | Status: CANCELLED | OUTPATIENT
Start: 2022-09-28

## 2022-09-28 RX ORDER — QUETIAPINE FUMARATE 100 MG/1
100 TABLET, FILM COATED ORAL
Qty: 90 TABLET | Refills: 0 | Status: SHIPPED | OUTPATIENT
Start: 2022-09-28 | End: 2022-12-27

## 2022-09-28 RX ORDER — LISINOPRIL AND HYDROCHLOROTHIAZIDE 20; 25 MG/1; MG/1
1 TABLET ORAL DAILY
Qty: 90 TABLET | Refills: 1 | Status: SHIPPED | OUTPATIENT
Start: 2022-09-28

## 2022-09-28 RX ORDER — LANOLIN ALCOHOL/MO/W.PET/CERES
325 CREAM (GRAM) TOPICAL
Qty: 90 TABLET | Refills: 1 | Status: SHIPPED | OUTPATIENT
Start: 2022-09-28

## 2022-09-28 NOTE — PROGRESS NOTES
Maggie Menendez is a 58 y.o. female who presents to the office today for the following:    Chief Complaint   Patient presents with    Surgical Follow-up    Bipolar    Medication Refill       Past Medical History:   Diagnosis Date    Anemia 08/16/2022    Arthritis     Asthma     Bipolar disorder, unspecified ([facility] [address] 01/28/2019    Blood in stool 06/28/2017    BLOOD IN STOOL    DR MATHEW'S NOTE    Chronic obstructive pulmonary disease ([facility] [address]     Per PCP note    Essential (primary) hypertension 01/28/2019    GERD (gastroesophageal reflux disease)     Hx of small bowel obstruction 2014    [address] [place] [address]    Hypercholesteremia 01/28/2019    Ill-defined condition     vein    Rectal bleeding 11/13/2017    Sciatica 01/28/2019    Sleep apnea     Patient stated does not use a CPAP    Stress incontinence     Varicose veins of both lower extremities     PER PCP NOTE    Ventral hernia        Past Surgical History:   Procedure Laterality Date    HX COLONOSCOPY  12/16/2014    RECTAL POLYP, DIVERTICULOSIS    HX COLONOSCOPY  11/17/2017    HX OF COLON POLYPS, DIVERTICULOSIS    HX HERNIA REPAIR      ventral hernia repair times 3 with mesh    HX HERNIA REPAIR  08/19/2022    REPAIR RECURRENT VENTRAL ABDOMINAL HERNIA, EXCISION OF ABDOMINAL WALL STITCH    HX HYSTERECTOMY      partial    HX ORTHOPAEDIC      Bunionectomy right great toe    HX OTHER SURGICAL      Epiglottis repair due to allergic reaction        Family History   Problem Relation Age of Onset    Hypertension Mother     Diabetes Mother     Hypertension Father     High Cholesterol Other         Social History     Tobacco Use    Smoking status: Every Day     Packs/day: 1.00     Years: 21.00     Pack years: 21.00     Types: Cigarettes    Smokeless tobacco: Never   Vaping Use    Vaping Use: Never used   Substance Use Topics    Alcohol use: Yes     Comment: Patient stated has a bourbon drink nightly after dinner    Drug use: Yes     Types: Marijuana        HPI  Patient here today for follow up with [facility] of copd, GERD, bipolar disorder, hypertension and tobacco dependence. States that in 8/2022 had hernia repair with Dr. Eusebio Gutiérrez. Reports this is healed but still has tenderness over area. In past few weeks, ran out of her Seroquel as her psychiatrist told her to have primary care refill since they are leaving. Reports mood swings, trouble sleeping and crying spells since being off medication. No suicidal thoughts. Has not set up appointment yet with new psychiatrist.     Current Outpatient Medications on File Prior to Visit   Medication Sig    omeprazole (PRILOSEC) 40 mg capsule Take 1 capsule by mouth once daily    [DISCONTINUED] QUEtiapine (SEROquel) 25 mg tablet TAKE 1 TABLET BY MOUTH IN THE MORNING FOR 30 DAYS    [DISCONTINUED] QUEtiapine (SEROquel) 100 mg tablet Take 100 mg by mouth nightly. albuterol (PROVENTIL HFA, VENTOLIN HFA, PROAIR HFA) 90 mcg/actuation inhaler INHALE 2 PUFFS BY MOUTH EVERY 4 TO 6 HOURS AS NEEDED    [DISCONTINUED] lisinopril-hydroCHLOROthiazide (PRINZIDE, ZESTORETIC) 20-25 mg per tablet TAKE 1 TABLET BY MOUTH ONCE DAILY IN THE MORNING    isosorbide mononitrate ER (IMDUR) 30 mg tablet Take 30 mg by mouth in the morning. No current facility-administered medications on file prior to visit. Medications Ordered Today   Medications    lisinopril-hydroCHLOROthiazide (PRINZIDE, ZESTORETIC) 20-25 mg per tablet     Sig: Take 1 Tablet by mouth daily. in the morning     Dispense:  90 Tablet     Refill:  1    QUEtiapine (SEROquel) 25 mg tablet     Sig: TAKE 1 TABLET BY MOUTH IN THE MORNING FOR 30 DAYS     Dispense:  90 Tablet     Refill:  0    QUEtiapine (SEROquel) 100 mg tablet     Sig: Take 1 Tablet by mouth nightly for 90 days. Dispense:  90 Tablet     Refill:  0    ferrous sulfate 325 mg (65 mg iron) tablet     Sig: Take 1 Tablet by mouth Daily (before breakfast).      Dispense:  90 Tablet     Refill:  1        Review of Systems   Constitutional: Negative. HENT: Negative. Eyes: Negative. Respiratory:  Negative for cough, sputum production, shortness of breath and wheezing. Cardiovascular: Negative. Gastrointestinal:  Positive for abdominal pain. Negative for blood in stool, constipation, diarrhea, heartburn, melena, nausea and vomiting. Genitourinary: Negative. Musculoskeletal: Negative. Skin: Negative. Neurological: Negative. Visit Vitals  /75 (BP 1 Location: Left upper arm, BP Patient Position: Sitting, BP Cuff Size: Adult)   Pulse 81   Temp 97.6 °F (36.4 °C) (Temporal)   Resp 18   Ht 5' 2\" (1.575 m)   Wt 155 lb (70.3 kg)   SpO2 99%   BMI 28.35 kg/m²       Physical Exam  Vitals and nursing note reviewed. Constitutional:       Appearance: Normal appearance. Eyes:      Pupils: Pupils are equal, round, and reactive to light. Neck:      Vascular: No carotid bruit. Cardiovascular:      Rate and Rhythm: Normal rate and regular rhythm. Pulses: Normal pulses. Heart sounds: Normal heart sounds. Comments: Multiple non-tender varicose veins to bilateral lower extremities  Pulmonary:      Effort: Pulmonary effort is normal.      Breath sounds: Normal breath sounds. Abdominal:      General: Bowel sounds are normal.      Palpations: Abdomen is soft. Tenderness: There is abdominal tenderness (mid-abdomen). There is no guarding or rebound. Musculoskeletal:         General: Normal range of motion. Right lower leg: No edema. Left lower leg: No edema. Lymphadenopathy:      Cervical: No cervical adenopathy. Skin:     General: Skin is warm and dry. Neurological:      Mental Status: She is alert and oriented to person, place, and time. Mental status is at baseline.    Psychiatric:         Mood and Affect: Mood normal.         Behavior: Behavior normal.        1. Chronic obstructive pulmonary disease, unspecified COPD type ([facility] [address]  She reports intermittent use of albuterol inhaler but not > twice weekly    2. Gastroesophageal reflux disease without esophagitis  Continue prilosec as directed    3. Mixed bipolar disorder ([address]  Symptoms have been not been stable due to running out of medication over past 2 weeks  Have crying spells, moods swings, insomnia and depression since being off medicine  Needs refills today and will resume at prior dose with Seroquel 25mg in the morning and 100mg at bedtime. She has been provided information already by office to schedule psychiatry appointment  Denies any suicidal thoughts but advised to seek help immediately if these develops  Will continue twice weekly sessions with counselor    4. Essential (primary) hypertension  Blood pressure is controlled and continue medication as directed  Check readings at home and notify provider if > 140/90    5. Tobacco dependence  Continue to encourage smoking cessation and have counseled on risks with continue use. 6. S/p hernia repair  Has some residual tenderness to surgical site but this is now closed and healed  She does have another follow up with Dr. Nasir Moise who is monitoring    7. Varicose veins of bilateral lower extremities with pain  Encourage compression stockings 25-35mmhg daily and remove at  night    8. Iron deficiency anemia, unspecified iron deficiency anemia type  Lab Results   Component Value Date/Time    WBC 6.7 08/21/2022 07:06 AM    HGB 9.7 (L) 08/21/2022 07:06 AM    HCT 32.5 (L) 08/21/2022 07:06 AM    PLATELET 486 29/69/7076 07:06 AM    MCV 74.7 (L) 08/21/2022 07:06 AM   This has been present since before surgery but did slightly worsen afterwards  Continue ferrous sulfate 325mg daily   Going to refer to GI to evaluate  Repeat cbc in 4-6 weeks  - ferrous sulfate 325 mg (65 mg iron) tablet; Take 1 Tablet by mouth Daily (before breakfast). Dispense: 90 Tablet;  Refill: 1  - REFERRAL TO GASTROENTEROLOGY    9. Needs flu shot  Update annual flu shot  - INFLUENZA, FLUARIX, FLULAVAL, FLUZONE (AGE 6 MO+), AFLURIA(AGE 3Y+) IM, PF, 0.5 ML     Patient verbalizes understanding of plan of care as discussed above    Follow-up and Dispositions    Return in about 3 months (around 12/28/2022), or 4 weeks lab visit, for or sooner for worsening symptoms.

## 2022-09-28 NOTE — PROGRESS NOTES
Chief Complaint   Patient presents with    Surgical Follow-up    Bipolar    Medication Refill     Follow up    1. Have you been to the ER, urgent care clinic since your last visit? Hospitalized since your last visit? In chart     2. Have you seen or consulted any other health care providers outside of the [address] [place] [address] since your last visit? Include any pap smears or colon screening.   In chart

## 2022-09-30 RX ORDER — QUETIAPINE FUMARATE 100 MG/1
100 TABLET, FILM COATED ORAL
Qty: 90 TABLET | Refills: 1 | Status: SHIPPED | OUTPATIENT
Start: 2022-09-30

## 2022-09-30 RX ORDER — QUETIAPINE FUMARATE 25 MG/1
25 TABLET, FILM COATED ORAL DAILY
Qty: 90 TABLET | Refills: 1 | Status: SHIPPED | OUTPATIENT
Start: 2022-09-30

## 2022-10-24 ENCOUNTER — OFFICE VISIT (OUTPATIENT)
Dept: SURGERY | Age: 62
End: 2022-10-24
Payer: COMMERCIAL

## 2022-10-24 VITALS
RESPIRATION RATE: 18 BRPM | DIASTOLIC BLOOD PRESSURE: 80 MMHG | OXYGEN SATURATION: 98 % | BODY MASS INDEX: 29.3 KG/M2 | SYSTOLIC BLOOD PRESSURE: 138 MMHG | HEART RATE: 86 BPM | WEIGHT: 159.2 LBS | HEIGHT: 62 IN | TEMPERATURE: 98.2 F

## 2022-10-24 DIAGNOSIS — Z09 POSTOPERATIVE EXAMINATION: ICD-10-CM

## 2022-10-24 DIAGNOSIS — M94.0 COSTOCHONDRITIS: ICD-10-CM

## 2022-10-24 DIAGNOSIS — K43.2 RECURRENT VENTRAL HERNIA: Primary | ICD-10-CM

## 2022-10-24 PROCEDURE — 99024 POSTOP FOLLOW-UP VISIT: CPT | Performed by: COLON & RECTAL SURGERY

## 2022-10-24 RX ORDER — OXYCODONE AND ACETAMINOPHEN 5; 325 MG/1; MG/1
1 TABLET ORAL
Qty: 24 TABLET | Refills: 0 | Status: SHIPPED | OUTPATIENT
Start: 2022-10-24 | End: 2022-10-31

## 2022-11-03 ENCOUNTER — TELEPHONE (OUTPATIENT)
Dept: SURGERY | Age: 62
End: 2022-11-03

## 2022-11-03 NOTE — TELEPHONE ENCOUNTER
Mrs. Amanda Ruiz called in to remind Dr. Danny Del Toro to bring the Abdominal band to the [facility] office on Monday when he comes. Please remind him and if you have any questions you can give Mrs. Amanda Ruiz a call at [phone]

## 2022-11-28 ENCOUNTER — OFFICE VISIT (OUTPATIENT)
Dept: SURGERY | Age: 62
End: 2022-11-28
Payer: COMMERCIAL

## 2022-11-28 VITALS
HEART RATE: 96 BPM | HEIGHT: 62 IN | OXYGEN SATURATION: 99 % | DIASTOLIC BLOOD PRESSURE: 88 MMHG | TEMPERATURE: 98 F | WEIGHT: 162.8 LBS | BODY MASS INDEX: 29.96 KG/M2 | SYSTOLIC BLOOD PRESSURE: 150 MMHG | RESPIRATION RATE: 18 BRPM

## 2022-11-28 DIAGNOSIS — K43.2 RECURRENT VENTRAL HERNIA: Primary | ICD-10-CM

## 2022-11-28 PROCEDURE — 99213 OFFICE O/P EST LOW 20 MIN: CPT | Performed by: COLON & RECTAL SURGERY

## 2022-11-28 PROCEDURE — 3074F SYST BP LT 130 MM HG: CPT | Performed by: COLON & RECTAL SURGERY

## 2022-11-28 PROCEDURE — 3078F DIAST BP <80 MM HG: CPT | Performed by: COLON & RECTAL SURGERY

## 2022-11-28 RX ORDER — TRAZODONE HYDROCHLORIDE 50 MG/1
1 TABLET ORAL DAILY
COMMUNITY
Start: 2022-11-08

## 2022-11-28 RX ORDER — ARIPIPRAZOLE 10 MG/1
1 TABLET ORAL DAILY
COMMUNITY
Start: 2022-11-22

## 2022-11-28 NOTE — LETTER
11/28/2022    Patient: Yaz Dave   YOB: 1960   Date of Visit: 11/28/2022     Bryce Tapia NP  [id number] [facility]  Via In Basket    Dear Bryce Tapia NP,      Thank you for referring Ms. Yaz Dave to [address] [place] [address] for evaluation. My notes for this consultation are attached. If you have questions, please do not hesitate to call me. I look forward to following your patient along with you.       Sincerely,    Vanessa Marina MD

## 2022-11-28 NOTE — PROGRESS NOTES
OFFICE VISIT NOTE    Loida Avendaño is a 58 y.o. female who presents to the office today for:    Chief Complaint   Patient presents with    Follow-up     Complains of burning and soreness around incision site. 10/10 pain scale     Abdominal Pain       Chani Muller comes in today for follow-up status post repair of recurrent ventral abdominal hernia incisional abdominal wall stitch back in August 2022. She denies any recurrent hernia however continues to complain of pain which she describes as almost a tingling electrical feeling shooting across her abdominal wall and within the abdomen which is worse after eating.       Past Medical History:   Diagnosis Date    Anemia 08/16/2022    Arthritis     Asthma     Bipolar disorder, unspecified ([facility] [address] 01/28/2019    Blood in stool 06/28/2017    BLOOD IN STOOL    DR MATHEW'S NOTE    Chronic obstructive pulmonary disease ([facility]     Per PCP note    Essential (primary) hypertension 01/28/2019    GERD (gastroesophageal reflux disease)     Hx of small bowel obstruction 2014    [address] [place] [address]    Hypercholesteremia 01/28/2019    Ill-defined condition     vein    Rectal bleeding 11/13/2017    Sciatica 01/28/2019    Sleep apnea     Patient stated does not use a CPAP    Stress incontinence     Varicose veins of both lower extremities     PER PCP NOTE    Ventral hernia        Past Surgical History:   Procedure Laterality Date    HX COLONOSCOPY  12/16/2014    RECTAL POLYP, DIVERTICULOSIS    HX COLONOSCOPY  11/17/2017    HX OF COLON POLYPS, DIVERTICULOSIS    HX HERNIA REPAIR      ventral hernia repair times 3 with mesh    HX HERNIA REPAIR  08/19/2022    REPAIR RECURRENT VENTRAL ABDOMINAL HERNIA, EXCISION OF ABDOMINAL WALL STITCH    HX HYSTERECTOMY      partial    HX ORTHOPAEDIC      Bunionectomy right great toe    HX OTHER SURGICAL      Epiglottis repair due to allergic reaction       Family History   Problem Relation Age of Onset    Hypertension Mother     Diabetes Mother     Hypertension Father     High Cholesterol Other        Social History     Socioeconomic History    Marital status:      Spouse name: Not on file    Number of children: Not on file    Years of education: Not on file    Highest education level: Not on file   Occupational History    Not on file   Tobacco Use    Smoking status: Every Day     Packs/day: 1.00     Years: 21.00     Pack years: 21.00     Types: Cigarettes    Smokeless tobacco: Never   Vaping Use    Vaping Use: Never used   Substance and Sexual Activity    Alcohol use: Yes     Comment: Patient stated has a bourbon drink nightly after dinner    Drug use: Yes     Types: Marijuana    Sexual activity: Not Currently   Other Topics Concern    Not on file   Social History Narrative    Not on file     Social Determinants of Health     Financial Resource Strain: Not on file   Food Insecurity: Not on file   Transportation Needs: Not on file   Physical Activity: Not on file   Stress: Not on file   Social Connections: Not on file   Intimate Partner Violence: Not on file   Housing Stability: Not on file       Allergies   Allergen Reactions    Latex Itching    Hismanal Anaphylaxis       Current Outpatient Medications   Medication Sig    ARIPiprazole (ABILIFY) 10 mg tablet Take 1 Tablet by mouth daily. traZODone (DESYREL) 50 mg tablet Take 1 Tablet by mouth daily. lisinopril-hydroCHLOROthiazide (PRINZIDE, ZESTORETIC) 20-25 mg per tablet Take 1 Tablet by mouth daily. in the morning    ferrous sulfate 325 mg (65 mg iron) tablet Take 1 Tablet by mouth Daily (before breakfast).     omeprazole (PRILOSEC) 40 mg capsule Take 1 capsule by mouth once daily    albuterol (PROVENTIL HFA, VENTOLIN HFA, PROAIR HFA) 90 mcg/actuation inhaler INHALE 2 PUFFS BY MOUTH EVERY 4 TO 6 HOURS AS NEEDED    isosorbide mononitrate ER (IMDUR) 30 mg tablet Take 30 mg by mouth in the morning. No current facility-administered medications for this visit. Review of Systems   All other systems reviewed and are negative. BP (!) 150/88 (BP 1 Location: Left upper arm, BP Patient Position: Sitting)   Pulse 96   Temp 98 °F (36.7 °C) (Temporal)   Resp 18   Ht 5' 2\" (1.575 m)   Wt 73.8 kg (162 lb 12.8 oz)   SpO2 99%   BMI 29.78 kg/m²     Physical Exam  Abdominal:      Comments: Abdominal examination her periumbilical incision has healed nicely. There is no evidence of recurrent or persistent hernia either rest or on Valsalva. She is mildly tender palpation periincisional only. Problem List Items Addressed This Visit          Other    Recurrent ventral hernia - Primary       Assessment and Plan:  I told Herminio Ontiveros that I will schedule an CT scan of the abdomen pelvis to better evaluate her pain. At this point are expected to resolve as she is about 3 months out from surgery. She will follow-up with me in 2 weeks once her CT is complete.             Sergio Gilliam MD

## 2022-11-29 ENCOUNTER — TELEPHONE (OUTPATIENT)
Dept: SURGERY | Age: 62
End: 2022-11-29

## 2022-11-29 NOTE — TELEPHONE ENCOUNTER
Daija Spoon called back to see if Dr. Edin Rodriguez has sent over her prescription.  Can you please remind him to send it over and give her a call [phone]

## 2022-11-29 NOTE — TELEPHONE ENCOUNTER
Ms Naga Gill called this morning and asked if Dr Cleveland Gonzalez could sent her prescription that was supposed to be sent yesterday after her appointment, please send to [facility], [address] [facility], please call when able [phone]

## 2022-11-30 RX ORDER — OXYCODONE AND ACETAMINOPHEN 5; 325 MG/1; MG/1
1 TABLET ORAL
Qty: 24 TABLET | Refills: 0 | Status: SHIPPED | OUTPATIENT
Start: 2022-11-30 | End: 2022-12-01 | Stop reason: SDUPTHER

## 2022-12-01 DIAGNOSIS — K43.2 RECURRENT VENTRAL HERNIA: Primary | ICD-10-CM

## 2022-12-01 RX ORDER — OXYCODONE HYDROCHLORIDE 5 MG/1
5 TABLET ORAL
Qty: 24 TABLET | Refills: 0 | Status: SHIPPED | OUTPATIENT
Start: 2022-12-01 | End: 2022-12-08

## 2022-12-01 NOTE — TELEPHONE ENCOUNTER
told me to call [facility] to see if they just have Oxycodone 5mg without acetaminophen. I called and spoke with the pharmacy and they do.  sent [facility] to [facility]. I called and spoke with Ely Steward and made her aware.

## 2022-12-01 NOTE — TELEPHONE ENCOUNTER
Patient called to see if Dr. Eduardo Macdonald could send her a different medication, because the one that was sent over is on back order.  Please give her a call back when able [phone]

## 2022-12-13 PROBLEM — K44.9 HIATAL HERNIA: Status: ACTIVE | Noted: 2022-12-13

## 2022-12-13 PROBLEM — F32.A DEPRESSION: Status: ACTIVE | Noted: 2022-12-13

## 2022-12-16 ENCOUNTER — TELEPHONE (OUTPATIENT)
Dept: SURGERY | Age: 62
End: 2022-12-16

## 2022-12-16 NOTE — TELEPHONE ENCOUNTER
Spoke with Tomasz Desir and cancelled her follow up appointment on Monday since her CT Auth is still pending.

## 2022-12-16 NOTE — TELEPHONE ENCOUNTER
Ms Jabari Guan called this afternoon and stated shes supposed to have a scan done 12/19 and her insurance just called and said there is no supporting docs for the scan.  Please call [phone]

## 2023-01-10 ENCOUNTER — TELEPHONE (OUTPATIENT)
Dept: SURGERY | Age: 63
End: 2023-01-10

## 2023-01-10 NOTE — TELEPHONE ENCOUNTER
Patient called in stating that she is in pain and she need something for pain. Patient also wants to know if there was a update with her scan. She also states that she is very mad and upset.  Please call back when able [phone]

## 2023-01-10 NOTE — TELEPHONE ENCOUNTER
Left a message for Shima Pratt  That it looks like they approved her CT scan. I left her the number to give them a call to schedule.

## 2023-01-18 ENCOUNTER — TRANSCRIBE ORDER (OUTPATIENT)
Dept: REGISTRATION | Age: 63
End: 2023-01-18

## 2023-01-18 ENCOUNTER — HOSPITAL ENCOUNTER (OUTPATIENT)
Dept: CT IMAGING | Age: 63
Discharge: HOME | End: 2023-01-18
Attending: COLON & RECTAL SURGERY
Payer: MEDICAID

## 2023-01-18 ENCOUNTER — HOSPITAL ENCOUNTER (OUTPATIENT)
Dept: LAB | Age: 63
Discharge: HOME | End: 2023-01-18
Attending: COLON & RECTAL SURGERY
Payer: MEDICAID

## 2023-01-18 DIAGNOSIS — K43.2 RECURRENT VENTRAL HERNIA: ICD-10-CM

## 2023-01-18 DIAGNOSIS — K43.2 RECURRENT VENTRAL HERNIA: Primary | ICD-10-CM

## 2023-01-18 LAB — CREAT SERPL-MCNC: 0.94 MG/DL (ref 0.55–1.02)

## 2023-01-18 PROCEDURE — 36415 COLL VENOUS BLD VENIPUNCTURE: CPT

## 2023-01-18 PROCEDURE — 74177 CT ABD & PELVIS W/CONTRAST: CPT

## 2023-01-18 PROCEDURE — 82565 ASSAY OF CREATININE: CPT

## 2023-01-18 PROCEDURE — 74011000636 HC RX REV CODE- 636: Performed by: COLON & RECTAL SURGERY

## 2023-01-18 RX ADMIN — IOPAMIDOL 100 ML: 755 INJECTION, SOLUTION INTRAVENOUS at 10:33

## 2023-01-18 RX ADMIN — DIATRIZOATE MEGLUMINE AND DIATRIZOATE SODIUM 30 ML: 660; 100 LIQUID ORAL; RECTAL at 10:33

## 2023-01-20 ENCOUNTER — TELEPHONE (OUTPATIENT)
Dept: SURGERY | Age: 63
End: 2023-01-20

## 2023-01-20 DIAGNOSIS — K43.2 RECURRENT VENTRAL HERNIA: Primary | ICD-10-CM

## 2023-01-20 RX ORDER — OXYCODONE HYDROCHLORIDE 5 MG/1
5 TABLET ORAL
Qty: 24 TABLET | Refills: 0 | Status: SHIPPED | OUTPATIENT
Start: 2023-01-20 | End: 2023-01-27

## 2023-01-20 NOTE — TELEPHONE ENCOUNTER
Kianna Gaston would like to see her Jan 30th. I called and made her an appointment.   looked at her CT Scan results and will send her in some pain medication in before the end of the day.

## 2023-01-20 NOTE — TELEPHONE ENCOUNTER
Patient called in to get pain medication refilled for oxycodone. Patient stated that she has been taking her dog pain medication.  Please call back when able [phone]

## 2023-01-23 ENCOUNTER — TELEPHONE (OUTPATIENT)
Dept: SURGERY | Age: 63
End: 2023-01-23

## 2023-01-23 NOTE — TELEPHONE ENCOUNTER
Spoke with [facility] Lipscomb and they only gave her 3 tablets. That is all they had in stock at the time. I will discuss with  and see what he would like to do.

## 2023-01-23 NOTE — TELEPHONE ENCOUNTER
Spoke with Neri Cervantes  She states that the medication is on back order at [facility] so she is going to call [facility] to see if it is in stock over there and call us back.

## 2023-01-23 NOTE — TELEPHONE ENCOUNTER
Ms Katja Oar called this morning and stated she picked up 3 pills on Saturday morning but now needs a refill. She is taking her dogs medication she said. She also stated she doesn't mind paying for the prescription.  Please call when able if needed [phone]

## 2023-01-23 NOTE — TELEPHONE ENCOUNTER
Ms Alva Presume called this morning and stated that since [facility] does not have her medication it needs to be sent to [facility], ph# [phone]

## 2023-01-24 NOTE — TELEPHONE ENCOUNTER
Ms Judah Cary called this afternoon and asked if Dr Ayesha Caldera had sent her medication to [facility] yet, I advised her I would put another message in, call when able [phone]

## 2023-01-24 NOTE — TELEPHONE ENCOUNTER
Ms Nida Jeimyelaine called back and told me that she went to [facility] and asked the pharmacy if they had any of the prescription she was supposed to get they told her that had some in stock now, so now instead of [facility], Dr Nasir Moise can continue to fill them at [facility].

## 2023-01-25 ENCOUNTER — TELEPHONE (OUTPATIENT)
Dept: SURGERY | Age: 63
End: 2023-01-25

## 2023-01-25 DIAGNOSIS — K43.2 RECURRENT VENTRAL HERNIA: Primary | ICD-10-CM

## 2023-01-25 RX ORDER — OXYCODONE AND ACETAMINOPHEN 5; 325 MG/1; MG/1
1 TABLET ORAL
Qty: 24 TABLET | Refills: 0 | Status: SHIPPED | OUTPATIENT
Start: 2023-01-25 | End: 2023-02-08

## 2023-01-25 NOTE — TELEPHONE ENCOUNTER
I spoke with [address] they state that since the patient got 3 tablets the rest of the prescription is voided. They cannot give her the rest of the prescription the doctor will need to send in a new prescription.

## 2023-01-30 ENCOUNTER — OFFICE VISIT (OUTPATIENT)
Dept: SURGERY | Age: 63
End: 2023-01-30
Payer: MEDICAID

## 2023-01-30 VITALS
BODY MASS INDEX: 30.73 KG/M2 | SYSTOLIC BLOOD PRESSURE: 160 MMHG | WEIGHT: 167 LBS | HEART RATE: 78 BPM | TEMPERATURE: 97 F | DIASTOLIC BLOOD PRESSURE: 90 MMHG | OXYGEN SATURATION: 97 % | HEIGHT: 62 IN | RESPIRATION RATE: 16 BRPM

## 2023-01-30 DIAGNOSIS — M94.0 COSTOCHONDRITIS: ICD-10-CM

## 2023-01-30 DIAGNOSIS — K43.2 RECURRENT VENTRAL HERNIA: Primary | ICD-10-CM

## 2023-01-30 PROCEDURE — 3077F SYST BP >= 140 MM HG: CPT | Performed by: COLON & RECTAL SURGERY

## 2023-01-30 PROCEDURE — 99213 OFFICE O/P EST LOW 20 MIN: CPT | Performed by: COLON & RECTAL SURGERY

## 2023-01-30 PROCEDURE — 3080F DIAST BP >= 90 MM HG: CPT | Performed by: COLON & RECTAL SURGERY

## 2023-01-30 RX ORDER — OXYCODONE AND ACETAMINOPHEN 5; 325 MG/1; MG/1
1 TABLET ORAL
Qty: 24 TABLET | Refills: 0 | Status: SHIPPED | OUTPATIENT
Start: 2023-01-30 | End: 2023-02-06

## 2023-01-30 RX ORDER — MELOXICAM 15 MG/1
15 TABLET ORAL DAILY
Qty: 14 TABLET | Refills: 0 | Status: SHIPPED | OUTPATIENT
Start: 2023-01-30 | End: 2023-02-13

## 2023-01-30 RX ORDER — HYDROXYZINE 25 MG/1
1 TABLET, FILM COATED ORAL DAILY
COMMUNITY
Start: 2023-01-05

## 2023-01-30 NOTE — PROGRESS NOTES
OFFICE VISIT NOTE    Jey Jmaes is a 58 y.o. female who presents to the office today for:    Chief Complaint   Patient presents with    Follow-up     Pain under ribs        Jenn Timmons comes in today for follow-up after her recent CT scan. She been having some increased abdominal pain and I referred her for CT scan of the abdomen pelvis to evaluate for recurrent ventral hernia. She has had a mesh repair of upper hernia as well as lower hernia and she had developed a hernia between the 2 which had repaired primarily and August 2022. Her repeat CT which was done January 18, 2023 demonstrated:  ABDOMINAL WALL: Anterior abdominal wall mesh hernia repair at apparently 2  sites, one superior and one inferior with small fat-containing hernia between  the 2 sites into which a knuckle of small bowel approaches. Fascial defect at  the site measures approximately 2.8 x 2.4 cm with hernia sac measuring 3.3 x 4.1  x 1.5 cm. She also complains of pain along the right chondral margin lower rib at the rib cage.        Past Medical History:   Diagnosis Date    Anemia 08/16/2022    Arthritis     Asthma     Bipolar disorder, unspecified ([address] 01/28/2019    Blood in stool 06/28/2017    BLOOD IN STOOL    DR MATHEW'S NOTE    Bowel obstruction ([address] 2014    Chronic obstructive pulmonary disease [facility]     Per PCP note    Depression 12/13/2022    CHRONIC    Essential (primary) hypertension 01/28/2019    GERD (gastroesophageal reflux disease)     Hiatal hernia 12/13/2022    Hx of small bowel obstruction 2014    [address] [place] [address] [address], [place]    Hypercholesteremia 01/28/2019    Ill-defined condition     vein    Rectal bleeding 11/13/2017    Sciatica 01/28/2019    Sleep apnea     Patient stated does not use a CPAP    Stress incontinence     Varicose veins of both lower extremities     PER PCP NOTE    Ventral hernia        Past Surgical History:   Procedure Laterality Date    HX COLONOSCOPY  12/16/2014    RECTAL POLYP, DIVERTICULOSIS    HX COLONOSCOPY  11/17/2017    HX OF COLON POLYPS, DIVERTICULOSIS    HX HERNIA REPAIR      ventral hernia repair times 3 with mesh    HX HERNIA REPAIR  08/19/2022    REPAIR RECURRENT VENTRAL ABDOMINAL HERNIA, EXCISION OF ABDOMINAL WALL STITCH    HX HYSTERECTOMY      partial    HX ORTHOPAEDIC      Bunionectomy right great toe    HX OTHER SURGICAL      Epiglottis repair due to allergic reaction       Family History   Problem Relation Age of Onset    Hypertension Mother     Diabetes Mother     Hypertension Father     High Cholesterol Other        Social History     Socioeconomic History    Marital status:      Spouse name: Not on file    Number of children: Not on file    Years of education: Not on file    Highest education level: Not on file   Occupational History    Not on file   Tobacco Use    Smoking status: Every Day     Packs/day: 1.00     Years: 21.00     Pack years: 21.00     Types: Cigarettes    Smokeless tobacco: Never   Vaping Use    Vaping Use: Never used   Substance and Sexual Activity    Alcohol use: Yes     Comment: Patient stated has a bourbon drink nightly after dinner    Drug use: Yes     Types: Marijuana    Sexual activity: Not Currently   Other Topics Concern    Not on file   Social History Narrative    Not on file     Social Determinants of Health     Financial Resource Strain: Not on file   Food Insecurity: Not on file   Transportation Needs: Not on file   Physical Activity: Not on file   Stress: Not on file   Social Connections: Not on file   Intimate Partner Violence: Not on file   Housing Stability: Not on file       Allergies   Allergen Reactions    Latex Itching    Hismanal Anaphylaxis       Current Outpatient Medications   Medication Sig    hydrOXYzine HCL (ATARAX) 25 mg tablet Take 1 Tablet by mouth daily.     oxyCODONE-acetaminophen (PERCOCET) 5-325 mg per tablet Take 1 Tablet by mouth every four (4) hours as needed for Pain for up to 14 days. Max Daily Amount: 6 Tablets. ARIPiprazole (ABILIFY) 10 mg tablet Take 1 Tablet by mouth daily. traZODone (DESYREL) 50 mg tablet Take 1 Tablet by mouth daily. lisinopril-hydroCHLOROthiazide (PRINZIDE, ZESTORETIC) 20-25 mg per tablet Take 1 Tablet by mouth daily. in the morning    ferrous sulfate 325 mg (65 mg iron) tablet Take 1 Tablet by mouth Daily (before breakfast). omeprazole (PRILOSEC) 40 mg capsule Take 1 capsule by mouth once daily    albuterol (PROVENTIL HFA, VENTOLIN HFA, PROAIR HFA) 90 mcg/actuation inhaler INHALE 2 PUFFS BY MOUTH EVERY 4 TO 6 HOURS AS NEEDED    isosorbide mononitrate ER (IMDUR) 30 mg tablet Take 30 mg by mouth in the morning. No current facility-administered medications for this visit. Review of Systems   All other systems reviewed and are negative. BP (!) 160/90 (BP 1 Location: Left upper arm, BP Patient Position: Sitting)   Pulse 78   Temp 97 °F (36.1 °C) (Temporal)   Resp 16   Ht 5' 2\" (1.575 m)   Wt 75.8 kg (167 lb)   SpO2 97%   BMI 30.54 kg/m²     Physical Exam  Abdominal:          Comments: Tender in at costochondral margin  Tender at periumbilical incision, suggestion of recurrent hernia       Problem List Items Addressed This Visit          Skeletal    Costochondritis       Other    Recurrent ventral hernia - Primary       Assessment and Plan:  I told Tushar Olszewski that she will require surgery for her recurrent hernia however she is wishes to wait at this time as she has no one to help her at home postoperatively. I told her there is no urgency as there is no evidence that there is any incarceration or strangulation. In terms of her costochondritis I will prescribe Mobic. She will come back and see me in 1 month.             Vanessa Marina MD

## 2023-01-30 NOTE — LETTER
1/30/2023    Patient: Morteza Cash   YOB: 1960   Date of Visit: 1/30/2023     Sri Stevens NP  [id number] [facility]  Via In Basket    Dear Sri Stevens NP,      Thank you for referring Ms. Morteza Cash to [address] [place] [address] for evaluation. My notes for this consultation are attached. If you have questions, please do not hesitate to call me. I look forward to following your patient along with you.       Sincerely,    Jaya Spears MD

## 2023-01-30 NOTE — PROGRESS NOTES
Identified pt with two pt identifiers (name and ). Reviewed chart in preparation for visit and have obtained necessary documentation. Shima Pratt is a 58 y.o. female  Chief Complaint   Patient presents with    Follow-up     Pain under ribs      Visit Vitals  BP (!) 160/90 (BP 1 Location: Left upper arm, BP Patient Position: Sitting)   Pulse 78   Temp 97 °F (36.1 °C) (Temporal)   Resp 16   Ht 5' 2\" (1.575 m)   Wt 167 lb (75.8 kg)   SpO2 97%   BMI 30.54 kg/m²       1. Have you been to the ER, urgent care clinic since your last visit? Hospitalized since your last visit? No    2. Have you seen or consulted any other health care providers outside of the [address] [place] [address] since your last visit? Include any pap smears or colon screening.  No

## 2023-02-11 DIAGNOSIS — K21.9 GASTROESOPHAGEAL REFLUX DISEASE WITHOUT ESOPHAGITIS: ICD-10-CM

## 2023-02-12 RX ORDER — OMEPRAZOLE 40 MG/1
CAPSULE, DELAYED RELEASE ORAL
Qty: 90 CAPSULE | Refills: 0 | Status: SHIPPED | OUTPATIENT
Start: 2023-02-12

## 2023-04-17 ENCOUNTER — TELEPHONE (OUTPATIENT)
Dept: SURGERY | Age: 63
End: 2023-04-17

## 2023-04-17 NOTE — TELEPHONE ENCOUNTER
Called patient regarding appointment for today patient is having HVAC work at her house and can't come at 1:00PM today.  Patient is requesting antiinflammatory medications states everytime she eats her intestines swell

## 2023-04-21 ENCOUNTER — TELEPHONE (OUTPATIENT)
Dept: SURGERY | Age: 63
End: 2023-04-21

## 2023-04-24 ENCOUNTER — OFFICE VISIT (OUTPATIENT)
Dept: SURGERY | Age: 63
End: 2023-04-24

## 2023-04-24 VITALS
OXYGEN SATURATION: 99 % | WEIGHT: 170 LBS | HEIGHT: 62 IN | HEART RATE: 89 BPM | TEMPERATURE: 96.3 F | SYSTOLIC BLOOD PRESSURE: 163 MMHG | BODY MASS INDEX: 31.28 KG/M2 | DIASTOLIC BLOOD PRESSURE: 88 MMHG

## 2023-04-24 DIAGNOSIS — R10.33 PERIUMBILICAL ABDOMINAL PAIN: ICD-10-CM

## 2023-04-24 DIAGNOSIS — R10.11 RUQ PAIN: Primary | ICD-10-CM

## 2023-04-24 DIAGNOSIS — E66.9 OBESITY (BMI 30-39.9): ICD-10-CM

## 2023-04-24 PROCEDURE — 3078F DIAST BP <80 MM HG: CPT | Performed by: SURGERY

## 2023-04-24 PROCEDURE — 99214 OFFICE O/P EST MOD 30 MIN: CPT | Performed by: SURGERY

## 2023-04-24 PROCEDURE — 3074F SYST BP LT 130 MM HG: CPT | Performed by: SURGERY

## 2023-04-24 RX ORDER — CELECOXIB 200 MG/1
200 CAPSULE ORAL 2 TIMES DAILY
Qty: 60 CAPSULE | Refills: 0 | Status: SHIPPED | OUTPATIENT
Start: 2023-04-24 | End: 2023-07-23

## 2023-04-24 NOTE — PROGRESS NOTES
[facility]      Clinic Note - Follow up    [facility] returns for scheduled follow up today. She had a REPAIR RECURRENT VENTRAL ABDOMINAL HERNIA 08/19/2022 with Dr. Lorelei Stacy. Had 3 prior hernia repairs in [facility] prior to surgery with Lorelei Stacy 08/09/2022. She states she has had Ruq pain since immediately day after surgery 08/19/2022. Pain described as Constant, 8/10, worse after meals, alleviated with pain meds    She also complains of Midline lower abdominal pain since surgery, constant, 8/10, no history of similar symptoms in past, alleviated by pain meds. CT scan performed for workup for this pain and showed hernia in midline. She says she Doesn't want surgery for her hernia. She understands risks, benefits and alternatives. She denies nausea, vomiting, diarrhea,  denies constipation, blood per rectum, no melena. She says she is due for colonoscopy with Qing Bhardwaj- [facility] recommended she follow up and emphasized importance of screening colonoscopy. She denies Fever, chills, chest pain, shortness of breath    Objective     Visit Vitals  BP (!) 163/88   Pulse 89   Temp (!) 96.3 °F (35.7 °C) (Temporal)   Ht 5' 2\" (1.575 m)   Wt 170 lb (77.1 kg)   SpO2 99%   BMI 31.09 kg/m²         PE  GEN - Awake, alert, communicating appropriately. NAD  Pulm - NWAB  Abd - soft, tender midline lower abdomen and RUQ no rebound or guarding, reducible hernia. Predominantly central obesity. Incisions healed  All other systems negative unless indicated above. Labs  None      Imaging  None  CT Results (most recent):  Results from Hospital Encounter encounter on 01/18/23    CT ABD PELV W CONT    Narrative  EXAM: CT ABD PELV W CONT    INDICATION: Evaluate for periumbilical pain    COMPARISON: CT 7/28/2021. CONTRAST: 100 mL of Isovue-370. ORAL CONTRAST: Oral contrast was administered to better evaluate the bowel.     TECHNIQUE:  Following the uneventful intravenous administration of contrast, thin axial  images were obtained through the abdomen and pelvis. Coronal and sagittal  reconstructions were generated. CT dose reduction was achieved through use of a  standardized protocol tailored for this examination and automatic exposure  control for dose modulation. FINDINGS:  LOWER THORAX: The visualized lung bases are clear. The visualized heart is  normal in size without pericardial effusion. Coronary artery calcifications are  noted. LIVER: No mass. BILIARY TREE: Gallbladder is within normal limits. CBD is not dilated. SPLEEN: No significant change in 2.0 cm hypodense/hypoenhancing lesion which  when compared to prior examination shows enhancement pattern compatible with  hemangioma. Otherwise unremarkable. PANCREAS: No mass or ductal dilatation. ADRENALS: Unremarkable. KIDNEYS: No mass, calculus, or hydronephrosis. STOMACH: Postsurgical changes of hiatal hernia repair. Otherwise unremarkable. SMALL BOWEL: No dilatation or wall thickening. COLON: No dilatation or wall thickening. APPENDIX: Unremarkable. PERITONEUM: No ascites or pneumoperitoneum. RETROPERITONEUM: Atherosclerotic calcification without aneurysm or dissection. No enlarged lymphadenopathy. REPRODUCTIVE ORGANS: Uterus and ovaries are surgically absent. URINARY BLADDER: No mass or calculus. BONES: Degenerative spine change. No acute fracture or aggressive lesion. ABDOMINAL WALL: Anterior abdominal wall mesh hernia repair at apparently 2  sites, one superior and one inferior with small fat-containing hernia between  the 2 sites into which a knuckle of small bowel approaches. Fascial defect at  the site measures approximately 2.8 x 2.4 cm with hernia sac measuring 3.3 x 4.1  x 1.5 cm. ADDITIONAL COMMENTS: N/A    Impression  There is a small fat-containing hernia into which small bowel  approaches at the space between upper and lower sites of mesh hernia repair  detailed above.  Incidentals as above with no acute findings or other findings  correlate with pain. Assessment     Makayla Troy is a 58 y. o.yr old female with abdominal pain and recurrent ventral hernia    Plan     RUQ US to r/o symptomatic gallstones as cause of her RUQ pain  Referral to GI for possible EGD to r/o PUD/gastritis/duodenitis as cause of her RUQ pain  She states she does not want surgery at this time for her recurrent ventral hernia. She understands, risks, benefits and alternatives. Referred to nutritionist and psychologist for assistance with weight loss  Follow up in 2 weeks          30 mins of time was spent with the patient of which > 50% of the time involved evaluation, and face-to-face counseling of the patient regarding the proposed treatment plan.       Reagan Wade MD  4/25/2023

## 2023-04-24 NOTE — PROGRESS NOTES
Identified pt with two pt identifiers (name and ). Reviewed chart in preparation for visit and have obtained necessary documentation. Sally Moreira is a 58 y.o. female  Chief Complaint   Patient presents with    Post OP Follow Up     Post op hernia     Visit Vitals  BP (!) 164/87 (BP 1 Location: Left upper arm, BP Patient Position: Sitting, BP Cuff Size: Adult)   Pulse 89   Temp (!) 96.3 °F (35.7 °C) (Temporal)   Ht 5' 2\" (1.575 m)   Wt 170 lb (77.1 kg)   SpO2 99%   BMI 31.09 kg/m²       1. Have you been to the ER, urgent care clinic since your last visit? Hospitalized since your last visit? No    2. Have you seen or consulted any other health care providers outside of the [address] [place] [address] since your last visit? Include any pap smears or colon screening. No    Patient and provider made aware of elevated BP x2. Patient asymptomatic. Patient reminded to monitor BP, continue to take BP medications if prescribed, and follow up with PCP/Cardiologist.  Patient expressed understanding and agreement.

## 2023-04-25 ENCOUNTER — TELEPHONE (OUTPATIENT)
Dept: PRIMARY CARE CLINIC | Age: 63
End: 2023-04-25

## 2023-04-25 ENCOUNTER — OFFICE VISIT (OUTPATIENT)
Dept: PRIMARY CARE CLINIC | Age: 63
End: 2023-04-25
Payer: MEDICAID

## 2023-04-25 VITALS
DIASTOLIC BLOOD PRESSURE: 68 MMHG | WEIGHT: 172.6 LBS | HEART RATE: 87 BPM | SYSTOLIC BLOOD PRESSURE: 117 MMHG | HEIGHT: 62 IN | OXYGEN SATURATION: 98 % | RESPIRATION RATE: 18 BRPM | BODY MASS INDEX: 31.76 KG/M2 | TEMPERATURE: 97.6 F

## 2023-04-25 DIAGNOSIS — K21.9 GASTROESOPHAGEAL REFLUX DISEASE WITHOUT ESOPHAGITIS: ICD-10-CM

## 2023-04-25 DIAGNOSIS — Z12.11 SCREENING FOR MALIGNANT NEOPLASM OF COLON: ICD-10-CM

## 2023-04-25 DIAGNOSIS — Z98.890 S/P HERNIA REPAIR: ICD-10-CM

## 2023-04-25 DIAGNOSIS — F17.200 TOBACCO DEPENDENCE: ICD-10-CM

## 2023-04-25 DIAGNOSIS — D50.9 IRON DEFICIENCY ANEMIA, UNSPECIFIED IRON DEFICIENCY ANEMIA TYPE: ICD-10-CM

## 2023-04-25 DIAGNOSIS — J44.1 CHRONIC OBSTRUCTIVE PULMONARY DISEASE WITH ACUTE EXACERBATION (HCC): Primary | ICD-10-CM

## 2023-04-25 DIAGNOSIS — I10 ESSENTIAL (PRIMARY) HYPERTENSION: ICD-10-CM

## 2023-04-25 DIAGNOSIS — Z87.19 S/P HERNIA REPAIR: ICD-10-CM

## 2023-04-25 DIAGNOSIS — F31.60 MIXED BIPOLAR DISORDER (HCC): ICD-10-CM

## 2023-04-25 PROCEDURE — 3078F DIAST BP <80 MM HG: CPT | Performed by: NURSE PRACTITIONER

## 2023-04-25 PROCEDURE — 99214 OFFICE O/P EST MOD 30 MIN: CPT | Performed by: NURSE PRACTITIONER

## 2023-04-25 PROCEDURE — 3074F SYST BP LT 130 MM HG: CPT | Performed by: NURSE PRACTITIONER

## 2023-04-25 RX ORDER — UREA 10 %
325 LOTION (ML) TOPICAL
Qty: 90 TABLET | Refills: 1 | Status: SHIPPED | OUTPATIENT
Start: 2023-04-25

## 2023-04-25 RX ORDER — OMEPRAZOLE 40 MG/1
40 CAPSULE, DELAYED RELEASE ORAL DAILY
Qty: 90 CAPSULE | Refills: 1 | Status: SHIPPED | OUTPATIENT
Start: 2023-04-25

## 2023-04-25 RX ORDER — LUMATEPERONE 42 MG/1
42 CAPSULE ORAL DAILY
COMMUNITY
Start: 2023-03-24

## 2023-04-25 RX ORDER — QUETIAPINE FUMARATE 25 MG/1
TABLET, FILM COATED ORAL
COMMUNITY
Start: 2023-01-24 | End: 2023-04-25 | Stop reason: ALTCHOICE

## 2023-04-25 RX ORDER — TRAZODONE HYDROCHLORIDE 100 MG/1
TABLET ORAL
COMMUNITY
Start: 2023-03-31

## 2023-04-25 RX ORDER — LISINOPRIL AND HYDROCHLOROTHIAZIDE 20; 25 MG/1; MG/1
1 TABLET ORAL DAILY
Qty: 90 TABLET | Refills: 1 | Status: SHIPPED | OUTPATIENT
Start: 2023-04-25

## 2023-04-25 RX ORDER — PREDNISONE 20 MG/1
TABLET ORAL
Qty: 9 TABLET | Refills: 0 | Status: SHIPPED | OUTPATIENT
Start: 2023-04-25

## 2023-04-25 RX ORDER — FLUTICASONE PROPIONATE AND SALMETEROL 250; 50 UG/1; UG/1
1 POWDER RESPIRATORY (INHALATION) EVERY 12 HOURS
Qty: 60 EACH | Refills: 2 | Status: SHIPPED | OUTPATIENT
Start: 2023-04-25

## 2023-04-25 RX ORDER — ISOSORBIDE MONONITRATE 30 MG/1
30 TABLET, EXTENDED RELEASE ORAL DAILY
Qty: 90 TABLET | Refills: 1 | Status: SHIPPED | OUTPATIENT
Start: 2023-04-25

## 2023-04-25 NOTE — PROGRESS NOTES
Chief Complaint   Patient presents with    Hypertension     Follow up. Pt wants to do the ct lung screening. 1. \"Have you been to the ER, urgent care clinic since your last visit? Hospitalized since your last visit? \" No    2. \"Have you seen or consulted any other health care providers outside of the [address] [place] [address] since your last visit? \" No     3. For patients aged 39-70: Has the patient had a colonoscopy / FIT/ Cologuard? Yes - no Care Gap present      If the patient is female:    4. For patients aged 41-77: Has the patient had a mammogram within the past 2 years? Yes - no Care Gap present      5. For patients aged 21-65: Has the patient had a pap smear?  Yes - no Care Gap present

## 2023-04-25 NOTE — PROGRESS NOTES
Sally Moreira is a 58 y.o. female who presents to the office today for the following:    Chief Complaint   Patient presents with    Hypertension       Past Medical History:   Diagnosis Date    Anemia 08/16/2022    Arthritis     Asthma     Bipolar disorder, unspecified ([address] 01/28/2019    Blood in stool 06/28/2017    BLOOD IN STOOL    DR MATHEW'S NOTE    Bowel obstruction ([facility] 2014    Chronic obstructive pulmonary disease ([facility]     Per PCP note    Depression 12/13/2022    CHRONIC    Essential (primary) hypertension 01/28/2019    GERD (gastroesophageal reflux disease)     Hiatal hernia 12/13/2022    Hx of small bowel obstruction 2014    [address] [place] [address]    Hypercholesteremia 01/28/2019    Ill-defined condition     vein    Rectal bleeding 11/13/2017    Sciatica 01/28/2019    Sleep apnea     Patient stated does not use a CPAP    Stress incontinence     Varicose veins of both lower extremities     PER PCP NOTE    Ventral hernia        Past Surgical History:   Procedure Laterality Date    HX COLONOSCOPY  12/16/2014    RECTAL POLYP, DIVERTICULOSIS    HX COLONOSCOPY  11/17/2017    HX OF COLON POLYPS, DIVERTICULOSIS    HX HERNIA REPAIR      ventral hernia repair times 3 with mesh    HX HERNIA REPAIR  08/19/2022    REPAIR RECURRENT VENTRAL ABDOMINAL HERNIA, EXCISION OF ABDOMINAL WALL STITCH    HX HYSTERECTOMY      partial    HX ORTHOPAEDIC      Bunionectomy right great toe    HX OTHER SURGICAL      Epiglottis repair due to allergic reaction        Family History   Problem Relation Age of Onset    Hypertension Mother     Diabetes Mother     Hypertension Father     High Cholesterol Other         Social History     Tobacco Use    Smoking status: Every Day     Packs/day: 1.00     Years: 21.00     Pack years: 21.00     Types: Cigarettes    Smokeless tobacco: Never   Vaping Use    Vaping Use: Never used   Substance Use Topics    Alcohol use: Yes     Comment: Patient stated has a bourbon drink nightly after dinner    Drug use: Yes     Types: Marijuana        HPI  Patient here today for follow up of chronic conditions with PMH of copd, GERD, bipolar disorder, hypertension and tobacco dependence. Reports taking medications as directed and has been following with surgeon as well as psychiatry. Reports that she saw surgeon 1 day ago due to pain in which has persisted since hernia repair in 8/2022. Has had multiple surgeries with Dr. Ayaan Wesley in the past.  States that pain is usually after meals but improves with pain medication. He is scheduled to have an ultrasound as well as she was recommended to have repeat colonoscopy. Has been having more difficulty with wheezing and cough especially in the last few weeks. Denies any chest pain , fever or breathing difficulty. Does continue to smoke but has been trying to reduce. Current Outpatient Medications on File Prior to Visit   Medication Sig    Caplyta 42 mg capsule Take 1 Capsule by mouth daily. traZODone (DESYREL) 100 mg tablet TAKE 1 TABLET BY MOUTH AT BEDTIME AS NEEDED FOR SLEEP    [DISCONTINUED] QUEtiapine (SEROquel) 25 mg tablet TAKE 1 TABLET BY MOUTH ONCE DAILY AT NIGHT    celecoxib (CELEBREX) 200 mg capsule Take 1 Capsule by mouth two (2) times a day for 90 days. OTHER Abdominal binder    [DISCONTINUED] omeprazole (PRILOSEC) 40 mg capsule Take 1 capsule by mouth once daily    hydrOXYzine HCL (ATARAX) 25 mg tablet Take 1 Tablet by mouth daily. [DISCONTINUED] ARIPiprazole (ABILIFY) 10 mg tablet Take 1 Tablet by mouth daily. (Patient not taking: Reported on 4/24/2023)    [DISCONTINUED] traZODone (DESYREL) 50 mg tablet Take 1 Tablet by mouth daily. [DISCONTINUED] lisinopril-hydroCHLOROthiazide (PRINZIDE, ZESTORETIC) 20-25 mg per tablet Take 1 Tablet by mouth daily. in the morning    [DISCONTINUED] ferrous sulfate 325 mg (65 mg iron) tablet Take 1 Tablet by mouth Daily (before breakfast).     albuterol (PROVENTIL HFA, VENTOLIN HFA, PROAIR HFA) 90 mcg/actuation inhaler INHALE 2 PUFFS BY MOUTH EVERY 4 TO 6 HOURS AS NEEDED    [DISCONTINUED] isosorbide mononitrate ER (IMDUR) 30 mg tablet Take 1 Tablet by mouth daily. No current facility-administered medications on file prior to visit. Medications Ordered Today   Medications    lisinopril-hydroCHLOROthiazide (PRINZIDE, ZESTORETIC) 20-25 mg per tablet     Sig: Take 1 Tablet by mouth daily. in the morning     Dispense:  90 Tablet     Refill:  1    ferrous sulfate 325 mg (65 mg iron) tablet     Sig: Take 1 Tablet by mouth Daily (before breakfast). Dispense:  90 Tablet     Refill:  1    omeprazole (PRILOSEC) 40 mg capsule     Sig: Take 1 Capsule by mouth daily. Dispense:  90 Capsule     Refill:  1    isosorbide mononitrate ER (IMDUR) 30 mg tablet     Sig: Take 1 Tablet by mouth daily. Dispense:  90 Tablet     Refill:  1    predniSONE (DELTASONE) 20 mg tablet     Sig: Take 2 tablets by mouth x 3 days then 1 tablet by mouth x 3 days     Dispense:  9 Tablet     Refill:  0    fluticasone propion-salmeteroL (ADVAIR/WIXELA) 250-50 mcg/dose diskus inhaler     Sig: Take 1 Puff by inhalation every twelve (12) hours. Dispense:  60 Each     Refill:  2        Review of Systems   Constitutional: Negative. HENT: Negative. Eyes: Negative. Respiratory:  Positive for cough, sputum production and wheezing. Negative for shortness of breath. Cardiovascular: Negative. Gastrointestinal:  Positive for abdominal pain. Negative for blood in stool, constipation, diarrhea, heartburn, melena, nausea and vomiting. Genitourinary: Negative. Musculoskeletal: Negative. Skin: Negative. Neurological: Negative. Visit Vitals  /68 (BP 1 Location: Left upper arm, BP Patient Position: Sitting, BP Cuff Size: Large adult)   Pulse 87   Temp 97.6 °F (36.4 °C) (Temporal)   Resp 18   Ht 5' 2\" (1.575 m)   Wt 172 lb 9.6 oz (78.3 kg)   SpO2 98%   BMI 31.57 kg/m²       Physical Exam  Vitals and nursing note reviewed. Constitutional:       Appearance: Normal appearance. Eyes:      Pupils: Pupils are equal, round, and reactive to light. Neck:      Vascular: No carotid bruit. Cardiovascular:      Rate and Rhythm: Normal rate and regular rhythm. Pulses: Normal pulses. Heart sounds: Normal heart sounds. Comments: Multiple non-tender varicose veins to bilateral lower extremities  Pulmonary:      Effort: Pulmonary effort is normal.      Breath sounds: Normal breath sounds. Abdominal:      General: Bowel sounds are normal.      Palpations: Abdomen is soft. Tenderness: There is abdominal tenderness (mid-abdomen). There is no guarding or rebound. Musculoskeletal:         General: Normal range of motion. Right lower leg: No edema. Left lower leg: No edema. Lymphadenopathy:      Cervical: No cervical adenopathy. Skin:     General: Skin is warm and dry. Neurological:      Mental Status: She is alert and oriented to person, place, and time. Mental status is at baseline. Psychiatric:         Mood and Affect: Mood normal.         Behavior: Behavior normal.        1. Chronic obstructive pulmonary disease, unspecified COPD type ([address]  Reports frequent use of albuterol in the last 2 weeks with wheezing noted on exam  Will start on prednisone taper as directed  Start daily Advair as directed. Rinse mouth after each use. Check chest x-ray  Continue albuterol inhaler prn for wheezing shortness of breath  Best if develops any breathing difficulty or chest pain, seek emergency care immediately if provider unavailable. - XR CHEST PA LAT; Future  - predniSONE (DELTASONE) 20 mg tablet; Take 2 tablets by mouth x 3 days then 1 tablet by mouth x 3 days  Dispense: 9 Tablet; Refill: 0  - fluticasone propion-salmeteroL (ADVAIR/WIXELA) 250-50 mcg/dose diskus inhaler; Take 1 Puff by inhalation every twelve (12) hours. Dispense: 60 Each; Refill: 2    2.  Gastroesophageal reflux disease without esophagitis  Continue prilosec as directed    3. Mixed bipolar disorder ([address]  Currently on captlyta and trazodone  Moods have been reportedly stable  She is following with counselor and psychiatry who manages    4. Essential (primary) hypertension  Blood pressure is controlled and continue prinzide and isosorbide as directed  Check readings at home and notify provider if > 579/16  - METABOLIC PANEL, COMPREHENSIVE  - URINALYSIS W/ RFLX MICROSCOPIC  - LIPID PANEL    5. Tobacco dependence  Continue to encourage smoking cessation and have counseled on risks with continue use. 6. S/p hernia repair  Does report recent abdominal pain that she states has been present following repair in 8/2022  She had eval with surgeon on 4/24/23 and has US ordered   Is scheduled for follow up again following testing    7. Varicose veins of bilateral lower extremities with pain  Encourage compression stockings 25-35mmhg daily and remove at  night    8. Iron deficiency anemia, unspecified iron deficiency anemia type  Lab Results   Component Value Date/Time    WBC 6.7 08/21/2022 07:06 AM    HGB 9.7 (L) 08/21/2022 07:06 AM    HCT 32.5 (L) 08/21/2022 07:06 AM    PLATELET 729 86/16/0652 07:06 AM    MCV 74.7 (L) 08/21/2022 07:06 AM     Continue ferrous sulfate 325mg daily   Was referred to GI but did not attend. New referral has been sent as she is due to have repeat colonscopy. Repeat cbc   - ferrous sulfate 325 mg (65 mg iron) tablet; Take 1 Tablet by mouth Daily (before breakfast). Dispense: 90 Tablet; Refill: 1    8. Screening for malignant neoplasm of colon  Due for repeat colonoscopy at 5 years and sending referral today  - COLONOSCOPY; Future   - REFERRAL TO GASTROENTEROLOGY    .diag  Patient verbalizes understanding of plan of care as discussed above    Follow-up and Dispositions    Return in about 6 weeks (around 6/6/2023) for or sooner for worsening symptoms.

## 2023-05-05 ENCOUNTER — HOSPITAL ENCOUNTER (OUTPATIENT)
Dept: GENERAL RADIOLOGY | Age: 63
End: 2023-05-05
Attending: SURGERY
Payer: MEDICAID

## 2023-05-05 ENCOUNTER — HOSPITAL ENCOUNTER (OUTPATIENT)
Dept: ULTRASOUND IMAGING | Age: 63
End: 2023-05-05
Attending: SURGERY
Payer: MEDICAID

## 2023-05-05 DIAGNOSIS — R10.11 RUQ PAIN: ICD-10-CM

## 2023-05-05 DIAGNOSIS — J44.1 CHRONIC OBSTRUCTIVE PULMONARY DISEASE WITH ACUTE EXACERBATION (HCC): ICD-10-CM

## 2023-05-05 PROCEDURE — 71046 X-RAY EXAM CHEST 2 VIEWS: CPT

## 2023-05-05 PROCEDURE — 76705 ECHO EXAM OF ABDOMEN: CPT

## 2023-06-08 ENCOUNTER — OFFICE VISIT (OUTPATIENT)
Facility: CLINIC | Age: 63
End: 2023-06-08
Payer: COMMERCIAL

## 2023-06-08 VITALS
SYSTOLIC BLOOD PRESSURE: 156 MMHG | OXYGEN SATURATION: 98 % | HEIGHT: 62 IN | RESPIRATION RATE: 18 BRPM | HEART RATE: 94 BPM | DIASTOLIC BLOOD PRESSURE: 108 MMHG | TEMPERATURE: 98.7 F | BODY MASS INDEX: 32.02 KG/M2 | WEIGHT: 174 LBS

## 2023-06-08 DIAGNOSIS — M54.2 NECK PAIN: Primary | ICD-10-CM

## 2023-06-08 DIAGNOSIS — I10 ESSENTIAL (PRIMARY) HYPERTENSION: ICD-10-CM

## 2023-06-08 PROCEDURE — 3074F SYST BP LT 130 MM HG: CPT | Performed by: NURSE PRACTITIONER

## 2023-06-08 PROCEDURE — 3078F DIAST BP <80 MM HG: CPT | Performed by: NURSE PRACTITIONER

## 2023-06-08 PROCEDURE — 96372 THER/PROPH/DIAG INJ SC/IM: CPT | Performed by: NURSE PRACTITIONER

## 2023-06-08 PROCEDURE — 99214 OFFICE O/P EST MOD 30 MIN: CPT | Performed by: NURSE PRACTITIONER

## 2023-06-08 RX ORDER — LUMATEPERONE 42 MG/1
42 CAPSULE ORAL DAILY
COMMUNITY
Start: 2023-05-19

## 2023-06-08 RX ORDER — FLUTICASONE PROPIONATE AND SALMETEROL 50; 250 UG/1; UG/1
POWDER RESPIRATORY (INHALATION)
COMMUNITY
Start: 2023-04-25

## 2023-06-08 RX ORDER — METHOCARBAMOL 750 MG/1
750 TABLET, FILM COATED ORAL 3 TIMES DAILY PRN
Qty: 30 TABLET | Refills: 0 | Status: SHIPPED | OUTPATIENT
Start: 2023-06-08 | End: 2023-06-18

## 2023-06-08 RX ORDER — PREDNISONE 20 MG/1
TABLET ORAL
Qty: 9 TABLET | Refills: 0 | Status: SHIPPED | OUTPATIENT
Start: 2023-06-08 | End: 2023-06-14

## 2023-06-08 RX ORDER — KETOROLAC TROMETHAMINE 30 MG/ML
30 INJECTION, SOLUTION INTRAMUSCULAR; INTRAVENOUS ONCE
Status: COMPLETED | OUTPATIENT
Start: 2023-06-08 | End: 2023-06-08

## 2023-06-08 RX ADMIN — KETOROLAC TROMETHAMINE 60 MG: 30 INJECTION, SOLUTION INTRAMUSCULAR; INTRAVENOUS at 14:54

## 2023-06-08 NOTE — PROGRESS NOTES
Subjective  Chief Complaint   Patient presents with    Neck Pain     Pt stated she is in severe pain. Started 2 nights ago      HPI:  Maureen Cesar is a 58 y.o. female with PMH of copd, GERD, bipolar disorder, hypertension and tobacco dependence who presents with neck pain starting 2 days ago while awake. States that she sleeps with multiple pillows under her head and when woke up could not move neck due to pain. Denies any weakness in extremities or numbness. No history of neck injury or known disc disease. Has taken ibuprofen and Tylenol but not helping. States that she was having pain and did not take her medications yet today.     Past Medical History:   Diagnosis Date    Anemia 08/16/2022    Arthritis     Asthma     Bipolar disorder, unspecified ([facility] 01/28/2019    Blood in stool 06/28/2017    BLOOD IN STOOL    DR VERDUGO'S NOTE    Bowel obstruction ([facility] 2014    Chronic obstructive pulmonary disease ([facility]     Per PCP note    Depression 12/13/2022    CHRONIC    Essential (primary) hypertension 01/28/2019    GERD (gastroesophageal reflux disease)     Hiatal hernia 12/13/2022    Hx of small bowel obstruction [address] [place] [address]    Hypercholesteremia 01/28/2019    Ill-defined condition     vein    Rectal bleeding 11/13/2017    Sciatica 01/28/2019    Sleep apnea     Patient stated does not use a CPAP    Stress incontinence     Varicose veins of both lower extremities     PER PCP NOTE    Ventral hernia         Past Surgical History:   Procedure Laterality Date    COLONOSCOPY  12/16/2014    RECTAL POLYP, DIVERTICULOSIS    COLONOSCOPY  11/17/2017    HX OF COLON POLYPS, DIVERTICULOSIS    HERNIA REPAIR  08/19/2022    REPAIR RECURRENT VENTRAL ABDOMINAL HERNIA, EXCISION OF ABDOMINAL WALL STITCH    HERNIA REPAIR      ventral hernia repair times 3 with mesh    HYSTERECTOMY (CERVIX STATUS UNKNOWN)      partial    ORTHOPEDIC SURGERY      Bunionectomy right great toe    OTHER SURGICAL HISTORY      Epiglottis

## 2023-06-08 NOTE — PROGRESS NOTES
Chief Complaint   Patient presents with    Neck Pain     Pt stated she is in severe pain. Started 2 nights ago      Pt did not bring meds, went over list, pt confirmed       1. Have you been to the ER, urgent care clinic since your last visit? Hospitalized since your last visit? No    2. Have you seen or consulted any other health care providers outside of the [address] [place] [address] since your last visit? Include any pap smears or colon screening.  No

## 2023-06-09 ASSESSMENT — ENCOUNTER SYMPTOMS
GASTROINTESTINAL NEGATIVE: 1
RESPIRATORY NEGATIVE: 1

## 2023-07-19 ENCOUNTER — OFFICE VISIT (OUTPATIENT)
Facility: CLINIC | Age: 63
End: 2023-07-19
Payer: COMMERCIAL

## 2023-07-19 VITALS
OXYGEN SATURATION: 99 % | HEART RATE: 102 BPM | SYSTOLIC BLOOD PRESSURE: 123 MMHG | BODY MASS INDEX: 30.36 KG/M2 | HEIGHT: 62 IN | RESPIRATION RATE: 18 BRPM | DIASTOLIC BLOOD PRESSURE: 77 MMHG | WEIGHT: 165 LBS | TEMPERATURE: 97.8 F

## 2023-07-19 DIAGNOSIS — Z12.31 SCREENING MAMMOGRAM, ENCOUNTER FOR: ICD-10-CM

## 2023-07-19 DIAGNOSIS — J44.1 COPD EXACERBATION (HCC): Primary | ICD-10-CM

## 2023-07-19 PROCEDURE — 3074F SYST BP LT 130 MM HG: CPT | Performed by: NURSE PRACTITIONER

## 2023-07-19 PROCEDURE — 99214 OFFICE O/P EST MOD 30 MIN: CPT | Performed by: NURSE PRACTITIONER

## 2023-07-19 PROCEDURE — 3078F DIAST BP <80 MM HG: CPT | Performed by: NURSE PRACTITIONER

## 2023-07-19 RX ORDER — TRAZODONE HYDROCHLORIDE 100 MG/1
100 TABLET ORAL NIGHTLY PRN
COMMUNITY
Start: 2023-06-21

## 2023-07-19 RX ORDER — IPRATROPIUM BROMIDE AND ALBUTEROL SULFATE 2.5; .5 MG/3ML; MG/3ML
1 SOLUTION RESPIRATORY (INHALATION) EVERY 4 HOURS PRN
Qty: 360 ML | Refills: 1 | Status: SHIPPED | OUTPATIENT
Start: 2023-07-19

## 2023-07-19 RX ORDER — PREDNISONE 20 MG/1
TABLET ORAL
Qty: 9 TABLET | Refills: 0 | Status: SHIPPED | OUTPATIENT
Start: 2023-07-19 | End: 2023-07-25

## 2023-07-19 RX ORDER — AZITHROMYCIN 250 MG/1
250 TABLET, FILM COATED ORAL SEE ADMIN INSTRUCTIONS
Qty: 6 TABLET | Refills: 0 | Status: SHIPPED | OUTPATIENT
Start: 2023-07-19 | End: 2023-07-24

## 2023-07-19 RX ORDER — LANOLIN ALCOHOL/MO/W.PET/CERES
CREAM (GRAM) TOPICAL
COMMUNITY
Start: 2023-06-21

## 2023-07-19 RX ORDER — DEXAMETHASONE SODIUM PHOSPHATE 10 MG/ML
10 INJECTION INTRAMUSCULAR; INTRAVENOUS ONCE
Status: COMPLETED | OUTPATIENT
Start: 2023-07-19 | End: 2023-07-19

## 2023-07-19 RX ADMIN — DEXAMETHASONE SODIUM PHOSPHATE 10 MG: 10 INJECTION INTRAMUSCULAR; INTRAVENOUS at 15:56

## 2023-07-19 ASSESSMENT — ENCOUNTER SYMPTOMS
SHORTNESS OF BREATH: 1
SINUS PRESSURE: 1
SORE THROAT: 0
VOICE CHANGE: 0
CHEST TIGHTNESS: 1
CHOKING: 0
WHEEZING: 1
TROUBLE SWALLOWING: 0
COUGH: 1
GASTROINTESTINAL NEGATIVE: 1
SINUS PAIN: 0

## 2023-07-19 NOTE — PROGRESS NOTES
Subjective  Chief Complaint   Patient presents with    Chest Congestion    Cough    Headache    Sinus Problem     HPI:  Malick Wilson is a 58 y.o. female with PMH of copd, GERD, bipolar disorder, hypertension and tobacco dependence who presents with concerns regarding cough, headache, congestion, wheezing and shortness of breath starting in the past 7 days. States that she been unable to sleep  due to cough. Taking her advair daily along with using albuterol inhaler. Has not had any improvement since began. Is current smoker but avoiding since symptoms worsened. No fever, vomiting or diarrhea. Appetite and activity decreased. Not able to go to work due to symptoms.       Past Medical History:   Diagnosis Date    Anemia 08/16/2022    Arthritis     Asthma     Bipolar disorder, unspecified ([address]) 01/28/2019    Blood in stool 06/28/2017    BLOOD IN STOOL    DR VERDUGO'S NOTE    Bowel obstruction ([address]) 2014    Chronic obstructive pulmonary disease ([address])     Per PCP note    Depression 12/13/2022    CHRONIC    Essential (primary) hypertension 01/28/2019    GERD (gastroesophageal reflux disease)     Hiatal hernia 12/13/2022    Hx of small bowel obstruction 2014 [address] [place], [place]    Hypercholesteremia 01/28/2019    Ill-defined condition     vein    Rectal bleeding 11/13/2017    Sciatica 01/28/2019    Sleep apnea     Patient stated does not use a CPAP    Stress incontinence     Varicose veins of both lower extremities     PER PCP NOTE    Ventral hernia         Past Surgical History:   Procedure Laterality Date    COLONOSCOPY  12/16/2014    RECTAL POLYP, DIVERTICULOSIS    COLONOSCOPY  11/17/2017    HX OF COLON POLYPS, DIVERTICULOSIS    HERNIA REPAIR  08/19/2022    REPAIR RECURRENT VENTRAL ABDOMINAL HERNIA, EXCISION OF ABDOMINAL WALL STITCH    HERNIA REPAIR      ventral hernia repair times 3 with mesh    HYSTERECTOMY (CERVIX STATUS UNKNOWN)      partial    ORTHOPEDIC SURGERY      Bunionectomy right great toe    OTHER

## 2023-07-19 NOTE — PROGRESS NOTES
Chief Complaint   Patient presents with    Chest Congestion    Cough    Headache    Sinus Problem     All symptoms since over a week ago started last Sunday     1. Have you been to the ER, urgent care clinic since your last visit? Hospitalized since your last visit? No    2. Have you seen or consulted any other health care providers outside of the [address] [place] [address] Avenue since your last visit? Include any pap smears or colon screening.  No

## 2023-08-27 RX ORDER — ALBUTEROL SULFATE 90 UG/1
AEROSOL, METERED RESPIRATORY (INHALATION)
Qty: 18 G | Refills: 3 | Status: SHIPPED | OUTPATIENT
Start: 2023-08-27

## 2023-09-25 ENCOUNTER — NURSE TRIAGE (OUTPATIENT)
Dept: OTHER | Facility: CLINIC | Age: 63
End: 2023-09-25

## 2023-09-25 NOTE — TELEPHONE ENCOUNTER
Location [place]: [place]    Received call from [place] at [facility] with [employer]. Subjective: Caller states \"I was put on this medicine called Caplyta for my depression. Ever since then, the veins in my legs would throb. As I walk through my house, my legs would start shaking. I have been laying down a lot because of my legs. My hands they get numb, especially a night. My fingers get locked into position, I have to use my other hand to get my fingers to get out of that position. I have this sharp pain under my ribs. I had COPD. I feel like my body is shutting down. I can't even do household chores. \"     Current Symptoms: lower legs/feet swelling-left is worse, left leg pain, nodules on left leg, pain in bilateral hands-fingers locking up, swelling in left hand, left wrist pain/decreased ROM/weakness, pain under right rib with breathing-NO difficulty breathing, lightheaded at times-unable to check BP    NO hx of blood clot    Onset: 1 month ago; worsening    Associated Symptoms: reduced activity    Pain Severity: 8/10; soreness; waxing and waning    Temperature: Denies    What has been tried: Albuterol, Nebulizer, Compression socks    LMP: NA Pregnant: NA    Recommended disposition: Go to [facility]. Patient agreeable. Care advice provided, patient verbalizes understanding; denies any other questions or concerns; instructed to call back for any new or worsening symptoms. Patient/Caller agrees with recommended disposition; writer provided warm transfer to [employer] at [facility] for appointment scheduling. Attention Provider: Thank you for allowing me to participate in the care of your patient. The patient was connected to triage in response to information provided to the ECC/PSC. Please do not respond through this encounter as the response is not directed to a shared pool.       Reason for Disposition   Swelling of face, arm or hands  (Exception: Slight puffiness of fingers during hot

## 2023-09-26 ENCOUNTER — OFFICE VISIT (OUTPATIENT)
Facility: CLINIC | Age: 63
End: 2023-09-26
Payer: COMMERCIAL

## 2023-09-26 VITALS
BODY MASS INDEX: 30.36 KG/M2 | HEART RATE: 78 BPM | WEIGHT: 165 LBS | OXYGEN SATURATION: 99 % | SYSTOLIC BLOOD PRESSURE: 112 MMHG | TEMPERATURE: 96.9 F | DIASTOLIC BLOOD PRESSURE: 73 MMHG | RESPIRATION RATE: 18 BRPM | HEIGHT: 62 IN

## 2023-09-26 DIAGNOSIS — G25.9 EXTRAPYRAMIDAL AND MOVEMENT DISORDER: ICD-10-CM

## 2023-09-26 DIAGNOSIS — F31.62 BIPOLAR DISORDER, CURRENT EPISODE MIXED, MODERATE (HCC): Primary | ICD-10-CM

## 2023-09-26 DIAGNOSIS — R07.81 RIB PAIN ON RIGHT SIDE: ICD-10-CM

## 2023-09-26 PROCEDURE — 99214 OFFICE O/P EST MOD 30 MIN: CPT | Performed by: NURSE PRACTITIONER

## 2023-09-26 PROCEDURE — 3074F SYST BP LT 130 MM HG: CPT | Performed by: NURSE PRACTITIONER

## 2023-09-26 PROCEDURE — 3078F DIAST BP <80 MM HG: CPT | Performed by: NURSE PRACTITIONER

## 2023-09-26 RX ORDER — KETOROLAC TROMETHAMINE 30 MG/ML
60 INJECTION, SOLUTION INTRAMUSCULAR; INTRAVENOUS ONCE
Status: COMPLETED | OUTPATIENT
Start: 2023-09-26 | End: 2023-09-26

## 2023-09-26 RX ORDER — LORAZEPAM 0.5 MG/1
0.5 TABLET ORAL 2 TIMES DAILY PRN
Qty: 60 TABLET | Refills: 0 | Status: SHIPPED | OUTPATIENT
Start: 2023-09-26 | End: 2023-10-26

## 2023-09-26 RX ORDER — LITHIUM CARBONATE 300 MG
300 TABLET ORAL DAILY
COMMUNITY
Start: 2023-09-05

## 2023-09-26 RX ORDER — DIPHENHYDRAMINE HCL 25 MG
25 CAPSULE ORAL EVERY 6 HOURS
Qty: 28 CAPSULE | Refills: 0 | Status: SHIPPED | OUTPATIENT
Start: 2023-09-26 | End: 2023-10-03

## 2023-09-26 RX ADMIN — KETOROLAC TROMETHAMINE 60 MG: 30 INJECTION, SOLUTION INTRAMUSCULAR; INTRAVENOUS at 09:44

## 2023-09-26 ASSESSMENT — ENCOUNTER SYMPTOMS
GASTROINTESTINAL NEGATIVE: 1
RESPIRATORY NEGATIVE: 1
SINUS PAIN: 0
SORE THROAT: 0

## 2023-09-26 NOTE — PROGRESS NOTES
Chief Complaint   Patient presents with    Chronic Pain     Pt states is all over not in one general area      Pt is very emotional and upset. Pt states her legs hurt the most. Pt stopped taking some of her pysch meds 4 days ago by herself to see if that was the issues     Pt did not bring meds, went over list, pt confirmed      1. Have you been to the ER, urgent care clinic since your last visit? Hospitalized since your last visit? No    2. Have you seen or consulted any other health care providers outside of the [address] [place] [address] Avenue since your last visit? Include any pap smears or colon screening.  No

## 2023-09-26 NOTE — PROGRESS NOTES
Subjective  Chief Complaint   Patient presents with    Chronic Pain     Pt states is all over not in one general area      HPI:  Liliya Vergara is a 61 y.o. female with PMH of copd, GERD, bipolar disorder, hypertension and tobacco dependence who presents with concerns regarding pain in shoulders, legs and hands. Also feels like veins in legs are swelling or sticking out. Has had to peel back fingers at times due to feeling \"locked. \" Has had problems with joint pain intermittently but did not notice to this degree until starting calpyta 3-4 months ago. Has had trouble with sitting still and feeling restless. Sometimes feels hard to swallow but no strangling, coughing or choking with eating/drinking. Stopped the calpyta as a results in the past week and has seen improvement in symptoms. Noticed however that moods have worsened again and feeling anxious and again restless. No SI or HI. Denies hallucinations. Is scheduled to see her psychiatrist on 9/28/23.       Past Medical History:   Diagnosis Date    Anemia 08/16/2022    Arthritis     Asthma     Bipolar disorder, unspecified ([address]) 01/28/2019    Blood in stool 06/28/2017    BLOOD IN STOOL    DR VERDUGO'S NOTE    Bowel obstruction ([address]) 2014    Chronic obstructive pulmonary disease ([address])     Per PCP note    Depression 12/13/2022    CHRONIC    Essential (primary) hypertension 01/28/2019    GERD (gastroesophageal reflux disease)     Hiatal hernia 12/13/2022    Hx of small bowel obstruction 2014 [address] [place], [place]    Hypercholesteremia 01/28/2019    Ill-defined condition     vein    Rectal bleeding 11/13/2017    Sciatica 01/28/2019    Sleep apnea     Patient stated does not use a CPAP    Stress incontinence     Varicose veins of both lower extremities     PER PCP NOTE    Ventral hernia         Past Surgical History:   Procedure Laterality Date    COLONOSCOPY  12/16/2014    RECTAL POLYP, DIVERTICULOSIS    COLONOSCOPY  11/17/2017    HX OF COLON POLYPS,

## 2023-09-29 ENCOUNTER — TELEPHONE (OUTPATIENT)
Facility: CLINIC | Age: 63
End: 2023-09-29

## 2023-09-29 NOTE — TELEPHONE ENCOUNTER
Spoke with patient via phone. Is continuing with some pain in her legs but otherwise symptoms better. She was changed to new medication with psychiatrist. Will plan to follow up to re-evaluate pain in legs on 10/10/23 unless concern for worsening symptoms before then.

## 2023-10-06 ENCOUNTER — HOSPITAL ENCOUNTER (OUTPATIENT)
Facility: HOSPITAL | Age: 63
End: 2023-10-06
Payer: COMMERCIAL

## 2023-10-06 PROCEDURE — 71101 X-RAY EXAM UNILAT RIBS/CHEST: CPT

## 2023-10-12 ENCOUNTER — OFFICE VISIT (OUTPATIENT)
Facility: CLINIC | Age: 63
End: 2023-10-12
Payer: COMMERCIAL

## 2023-10-12 VITALS
OXYGEN SATURATION: 98 % | BODY MASS INDEX: 30.22 KG/M2 | HEART RATE: 74 BPM | DIASTOLIC BLOOD PRESSURE: 60 MMHG | WEIGHT: 164.2 LBS | HEIGHT: 62 IN | RESPIRATION RATE: 18 BRPM | TEMPERATURE: 97.1 F | SYSTOLIC BLOOD PRESSURE: 110 MMHG

## 2023-10-12 DIAGNOSIS — J44.9 CHRONIC OBSTRUCTIVE PULMONARY DISEASE, UNSPECIFIED COPD TYPE (HCC): ICD-10-CM

## 2023-10-12 DIAGNOSIS — R07.81 RIB PAIN ON RIGHT SIDE: ICD-10-CM

## 2023-10-12 DIAGNOSIS — K21.9 GASTRO-ESOPHAGEAL REFLUX DISEASE WITHOUT ESOPHAGITIS: ICD-10-CM

## 2023-10-12 DIAGNOSIS — F17.210 CIGARETTE NICOTINE DEPENDENCE WITHOUT COMPLICATION: ICD-10-CM

## 2023-10-12 DIAGNOSIS — M79.662 PAIN OF LEFT CALF: ICD-10-CM

## 2023-10-12 DIAGNOSIS — M10.9 GOUT, UNSPECIFIED CAUSE, UNSPECIFIED CHRONICITY, UNSPECIFIED SITE: ICD-10-CM

## 2023-10-12 DIAGNOSIS — I83.93 VARICOSE VEINS OF BOTH LOWER EXTREMITIES, UNSPECIFIED WHETHER COMPLICATED: ICD-10-CM

## 2023-10-12 DIAGNOSIS — I10 ESSENTIAL (PRIMARY) HYPERTENSION: ICD-10-CM

## 2023-10-12 DIAGNOSIS — G25.9 EXTRAPYRAMIDAL AND MOVEMENT DISORDER: ICD-10-CM

## 2023-10-12 DIAGNOSIS — E55.9 VITAMIN D DEFICIENCY: ICD-10-CM

## 2023-10-12 DIAGNOSIS — R73.03 PREDIABETES: ICD-10-CM

## 2023-10-12 DIAGNOSIS — M25.50 MULTIPLE JOINT PAIN: ICD-10-CM

## 2023-10-12 DIAGNOSIS — F31.62 BIPOLAR DISORDER, CURRENT EPISODE MIXED, MODERATE (HCC): Primary | ICD-10-CM

## 2023-10-12 LAB — HBA1C MFR BLD: 5.8 %

## 2023-10-12 PROCEDURE — 3074F SYST BP LT 130 MM HG: CPT | Performed by: NURSE PRACTITIONER

## 2023-10-12 PROCEDURE — 83036 HEMOGLOBIN GLYCOSYLATED A1C: CPT | Performed by: NURSE PRACTITIONER

## 2023-10-12 PROCEDURE — 99214 OFFICE O/P EST MOD 30 MIN: CPT | Performed by: NURSE PRACTITIONER

## 2023-10-12 PROCEDURE — 3078F DIAST BP <80 MM HG: CPT | Performed by: NURSE PRACTITIONER

## 2023-10-12 ASSESSMENT — ENCOUNTER SYMPTOMS
SORE THROAT: 0
SINUS PAIN: 0
GASTROINTESTINAL NEGATIVE: 1
RESPIRATORY NEGATIVE: 1

## 2023-10-12 NOTE — PROGRESS NOTES
Chief Complaint   Patient presents with    Leg Pain    rib pain      Costochondritis. Dr Dick Santizo had told her she may have had and that what she thinks it is     Arthritis     Hand pain      Pt is very angry that something has to be done about her pain. She wants  more testing done. Pt did not bring meds, went over list, pt confirmed      1. Have you been to the ER, urgent care clinic since your last visit? Hospitalized since your last visit? No    2. Have you seen or consulted any other health care providers outside of the [address] [place] [address] Avenue since your last visit? Include any pap smears or colon screening.  No
Thought content is not paranoid. Thought content does not include homicidal or suicidal ideation. Assessment & Plan   1. Bipolar disorder, current episode mixed, moderate ([address])  Does reports worsened mood swings since stopping the calypta  No SI or HI reported   Changed to divalproex per patient and started in past week  Has another upcoming follow up with psychiatry who is managing  Advised if develops any SI or HI , seek care immediately     2. Extrapyramidal and movement disorder  Sx reported as improved since stoping the calypta  She is following with psychiatry who is monitoring     3. Rib pain on right side  Xray Result (most recent):  XR RIBS RIGHT INCLUDE CHEST (MIN 3 VIEWS) 10/06/2023    Narrative  EXAM:  XR RIBS RIGHT INCLUDE CHEST (MIN 3 VIEWS)    INDICATION: Pain in the right chest wall    COMPARISON: Chest views on 5/20/2023 and 12/1/2020    TECHNIQUE: PA chest and 3 views of the right ribs. Examination presented for my  interpretation on 10/10/2023. FINDINGS: The cardiomediastinal and hilar contours are within normal limits. The  lungs and pleural spaces are clear bilaterally. The bones and upper abdomen are age-appropriate. No displaced rib fracture. Impression  No displaced rib fracture or pneumothorax. No change since May given difference  in technique. Symptoms persistent with xray showing no obvious signs of rib fracture or other acute process     4. Chronic obstructive pulmonary disease, unspecified COPD type ([address])   Not recently stable and has been having increase right sided chest/rib pain   Will check CT of chest to evaluate further given persistent pain and increased risk factors for pathology   Continue daily Advair as directed. Rinse mouth after each use. Continue albuterol inhaler prn for wheezing shortness of breath  - CT CHEST WO CONTRAST; Future     5. Gastroesophageal reflux disease without esophagitis   Continue prilosec as directed      6.  Essential

## 2023-10-14 LAB
25(OH)D3+25(OH)D2 SERPL-MCNC: 13.9 NG/ML (ref 30–100)
ALBUMIN SERPL-MCNC: 4.2 G/DL (ref 3.9–4.9)
ALBUMIN/CREAT UR: <3 MG/G CREAT (ref 0–29)
ALBUMIN/GLOB SERPL: 1.7 {RATIO} (ref 1.2–2.2)
ALP SERPL-CCNC: 102 IU/L (ref 44–121)
ALT SERPL-CCNC: 13 IU/L (ref 0–32)
AST SERPL-CCNC: 12 IU/L (ref 0–40)
BASOPHILS # BLD AUTO: 0.1 X10E3/UL (ref 0–0.2)
BASOPHILS NFR BLD AUTO: 1 %
BILIRUB SERPL-MCNC: <0.2 MG/DL (ref 0–1.2)
BUN SERPL-MCNC: 12 MG/DL (ref 8–27)
BUN/CREAT SERPL: 12 (ref 12–28)
CALCIUM SERPL-MCNC: 9.7 MG/DL (ref 8.7–10.3)
CHLORIDE SERPL-SCNC: 96 MMOL/L (ref 96–106)
CHOLEST SERPL-MCNC: 206 MG/DL (ref 100–199)
CK SERPL-CCNC: 93 U/L (ref 32–182)
CO2 SERPL-SCNC: 26 MMOL/L (ref 20–29)
CREAT SERPL-MCNC: 0.97 MG/DL (ref 0.57–1)
CREAT UR-MCNC: 90.8 MG/DL
CRP SERPL HS-MCNC: 7.37 MG/L (ref 0–3)
EGFRCR SERPLBLD CKD-EPI 2021: 66 ML/MIN/1.73
EOSINOPHIL # BLD AUTO: 0.1 X10E3/UL (ref 0–0.4)
EOSINOPHIL NFR BLD AUTO: 2 %
ERYTHROCYTE [DISTWIDTH] IN BLOOD BY AUTOMATED COUNT: 16.3 % (ref 11.7–15.4)
ERYTHROCYTE [SEDIMENTATION RATE] IN BLOOD BY WESTERGREN METHOD: 32 MM/HR (ref 0–40)
GLOBULIN SER CALC-MCNC: 2.5 G/DL (ref 1.5–4.5)
GLUCOSE SERPL-MCNC: 114 MG/DL (ref 70–99)
HCT VFR BLD AUTO: 39 % (ref 34–46.6)
HDLC SERPL-MCNC: 31 MG/DL
HGB BLD-MCNC: 11.6 G/DL (ref 11.1–15.9)
IMM GRANULOCYTES # BLD AUTO: 0 X10E3/UL (ref 0–0.1)
IMM GRANULOCYTES NFR BLD AUTO: 0 %
LDLC SERPL CALC-MCNC: 113 MG/DL (ref 0–99)
LYMPHOCYTES # BLD AUTO: 3 X10E3/UL (ref 0.7–3.1)
LYMPHOCYTES NFR BLD AUTO: 40 %
MCH RBC QN AUTO: 21.6 PG (ref 26.6–33)
MCHC RBC AUTO-ENTMCNC: 29.7 G/DL (ref 31.5–35.7)
MCV RBC AUTO: 73 FL (ref 79–97)
MICROALBUMIN UR-MCNC: <3 UG/ML
MONOCYTES # BLD AUTO: 0.5 X10E3/UL (ref 0.1–0.9)
MONOCYTES NFR BLD AUTO: 6 %
NEUTROPHILS # BLD AUTO: 3.8 X10E3/UL (ref 1.4–7)
NEUTROPHILS NFR BLD AUTO: 51 %
PLATELET # BLD AUTO: 329 X10E3/UL (ref 150–450)
POTASSIUM SERPL-SCNC: 3.8 MMOL/L (ref 3.5–5.2)
PROT SERPL-MCNC: 6.7 G/DL (ref 6–8.5)
RBC # BLD AUTO: 5.36 X10E6/UL (ref 3.77–5.28)
SODIUM SERPL-SCNC: 138 MMOL/L (ref 134–144)
TRIGL SERPL-MCNC: 358 MG/DL (ref 0–149)
TSH SERPL DL<=0.005 MIU/L-ACNC: 2.42 UIU/ML (ref 0.45–4.5)
URATE SERPL-MCNC: 8.1 MG/DL (ref 3–7.2)
VLDLC SERPL CALC-MCNC: 62 MG/DL (ref 5–40)
WBC # BLD AUTO: 7.4 X10E3/UL (ref 3.4–10.8)

## 2023-10-15 LAB
ANA SER QL: NEGATIVE
FOLATE SERPL-MCNC: 7.3 NG/ML
VIT B12 SERPL-MCNC: 1219 PG/ML (ref 232–1245)

## 2023-10-17 ENCOUNTER — TELEPHONE (OUTPATIENT)
Facility: CLINIC | Age: 63
End: 2023-10-17

## 2023-10-17 NOTE — TELEPHONE ENCOUNTER
Patient left message states that RX for Divalproex 500 mg taken at bedtime was written by psychiatrist, Genny Holm

## 2023-10-19 RX ORDER — ALLOPURINOL 100 MG/1
100 TABLET ORAL DAILY
Qty: 90 TABLET | Refills: 1 | Status: SHIPPED | OUTPATIENT
Start: 2023-10-19

## 2023-10-19 RX ORDER — ERGOCALCIFEROL 1.25 MG/1
1 CAPSULE ORAL
Qty: 8 CAPSULE | Refills: 0 | Status: SHIPPED | OUTPATIENT
Start: 2023-10-19

## 2023-10-23 ENCOUNTER — HOSPITAL ENCOUNTER (OUTPATIENT)
Facility: HOSPITAL | Age: 63
Discharge: HOME | End: 2023-10-25
Payer: COMMERCIAL

## 2023-10-23 ENCOUNTER — HOSPITAL ENCOUNTER (OUTPATIENT)
Facility: HOSPITAL | Age: 63
Discharge: HOME | End: 2023-10-26
Payer: COMMERCIAL

## 2023-10-23 ENCOUNTER — OFFICE VISIT (OUTPATIENT)
Facility: CLINIC | Age: 63
End: 2023-10-23
Payer: COMMERCIAL

## 2023-10-23 VITALS
BODY MASS INDEX: 30.07 KG/M2 | SYSTOLIC BLOOD PRESSURE: 127 MMHG | WEIGHT: 163.4 LBS | HEIGHT: 62 IN | HEART RATE: 87 BPM | TEMPERATURE: 98.6 F | RESPIRATION RATE: 18 BRPM | OXYGEN SATURATION: 98 % | DIASTOLIC BLOOD PRESSURE: 77 MMHG

## 2023-10-23 DIAGNOSIS — M25.551 RIGHT HIP PAIN: ICD-10-CM
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
## 2023-10-23 DIAGNOSIS — M79.662 PAIN OF LEFT CALF: ICD-10-CM

## 2023-10-23 DIAGNOSIS — M25.50 MULTIPLE JOINT PAIN: Primary | ICD-10-CM

## 2023-10-23 DIAGNOSIS — Z74.09 IMPAIRED MOBILITY AND ADLS: ICD-10-CM

## 2023-10-23 DIAGNOSIS — J44.9 CHRONIC OBSTRUCTIVE PULMONARY DISEASE, UNSPECIFIED COPD TYPE (HCC): ICD-10-CM

## 2023-10-23 DIAGNOSIS — Z78.9 IMPAIRED MOBILITY AND ADLS: ICD-10-CM

## 2023-10-23 PROCEDURE — 3078F DIAST BP <80 MM HG: CPT | Performed by: NURSE PRACTITIONER

## 2023-10-23 PROCEDURE — 71250 CT THORAX DX C-: CPT

## 2023-10-23 PROCEDURE — 93971 EXTREMITY STUDY: CPT

## 2023-10-23 PROCEDURE — 3074F SYST BP LT 130 MM HG: CPT | Performed by: NURSE PRACTITIONER

## 2023-10-23 PROCEDURE — 99214 OFFICE O/P EST MOD 30 MIN: CPT | Performed by: NURSE PRACTITIONER

## 2023-10-24 ENCOUNTER — TELEPHONE (OUTPATIENT)
Facility: CLINIC | Age: 63
End: 2023-10-24

## 2023-10-24 DIAGNOSIS — M25.50 MULTIPLE JOINT PAIN: Primary | ICD-10-CM

## 2023-10-24 RX ORDER — TRAMADOL HYDROCHLORIDE 50 MG/1
50 TABLET ORAL EVERY 6 HOURS PRN
Qty: 12 TABLET | Refills: 0 | Status: SHIPPED | OUTPATIENT
Start: 2023-10-24 | End: 2023-10-27

## 2023-10-24 RX ORDER — NALOXONE HYDROCHLORIDE 4 MG/.1ML
1 SPRAY NASAL PRN
Qty: 1 EACH | Refills: 2 | Status: SHIPPED | OUTPATIENT
Start: 2023-10-24

## 2023-10-24 NOTE — TELEPHONE ENCOUNTER
Pt called crying very upset about a medication that was suppose to go the pharmacy yesterday. I informed her that I did not know but would send you a message.

## 2023-10-25 ENCOUNTER — TELEPHONE (OUTPATIENT)
Facility: CLINIC | Age: 63
End: 2023-10-25

## 2023-10-25 ENCOUNTER — HOSPITAL ENCOUNTER (OUTPATIENT)
Facility: HOSPITAL | Age: 63
Discharge: HOME | End: 2023-10-28
Payer: COMMERCIAL

## 2023-10-25 LAB
14-3-3 ETA AG SER IA-MCNC: <0.2 NG/ML
CCP IGA+IGG SERPL IA-ACNC: <20 UNITS
RHEUMATOID FACT SERPL-ACNC: <14 UNITS/ML

## 2023-10-25 PROCEDURE — 73501 X-RAY EXAM HIP UNI 1 VIEW: CPT

## 2023-10-25 ASSESSMENT — ENCOUNTER SYMPTOMS: EYES NEGATIVE: 1

## 2023-10-26 ENCOUNTER — TELEPHONE (OUTPATIENT)
Facility: CLINIC | Age: 63
End: 2023-10-26

## 2023-10-29 ASSESSMENT — ENCOUNTER SYMPTOMS
SINUS PAIN: 0
GASTROINTESTINAL NEGATIVE: 1
RESPIRATORY NEGATIVE: 1
SORE THROAT: 0
EYES NEGATIVE: 1

## 2023-11-02 ENCOUNTER — TELEPHONE (OUTPATIENT)
Facility: CLINIC | Age: 63
End: 2023-11-02

## 2023-11-02 NOTE — TELEPHONE ENCOUNTER
Patient is requesting a call. She has not received her shower chair and cane and she wants the name of the DME company that it was sent.  Also her pain is getting worse and has not heard anything about the referral.

## 2023-11-03 RX ORDER — FLUTICASONE PROPIONATE AND SALMETEROL 50; 250 UG/1; UG/1
POWDER RESPIRATORY (INHALATION)
Qty: 180 EACH | Refills: 0 | Status: SHIPPED | OUTPATIENT
Start: 2023-11-03

## 2023-11-15 ENCOUNTER — NURSE TRIAGE (OUTPATIENT)
Dept: OTHER | Facility: CLINIC | Age: 63
End: 2023-11-15

## 2023-11-17 ENCOUNTER — OFFICE VISIT (OUTPATIENT)
Facility: CLINIC | Age: 63
End: 2023-11-17

## 2023-11-17 VITALS
BODY MASS INDEX: 30.33 KG/M2 | RESPIRATION RATE: 20 BRPM | HEIGHT: 62 IN | HEART RATE: 72 BPM | WEIGHT: 164.8 LBS | OXYGEN SATURATION: 98 % | TEMPERATURE: 97.1 F | DIASTOLIC BLOOD PRESSURE: 60 MMHG | SYSTOLIC BLOOD PRESSURE: 105 MMHG

## 2023-11-17 DIAGNOSIS — Z23 IMMUNIZATION DUE: ICD-10-CM

## 2023-11-17 DIAGNOSIS — H69.81 EUSTACHIAN TUBE DYSFUNCTION, RIGHT: ICD-10-CM

## 2023-11-17 DIAGNOSIS — R09.A2 GLOBUS SENSATION: Primary | ICD-10-CM

## 2023-11-17 RX ORDER — FLUTICASONE PROPIONATE 50 MCG
2 SPRAY, SUSPENSION (ML) NASAL DAILY
Qty: 16 G | Refills: 0 | Status: SHIPPED | OUTPATIENT
Start: 2023-11-17

## 2023-11-17 RX ORDER — DIVALPROEX SODIUM 500 MG/1
500 TABLET, EXTENDED RELEASE ORAL
COMMUNITY
Start: 2023-11-03

## 2023-11-17 ASSESSMENT — ENCOUNTER SYMPTOMS
TROUBLE SWALLOWING: 1
GASTROINTESTINAL NEGATIVE: 1
EYES NEGATIVE: 1
SINUS PAIN: 0
SORE THROAT: 0
VOICE CHANGE: 1
RESPIRATORY NEGATIVE: 1

## 2023-11-17 NOTE — PROGRESS NOTES
Chief Complaint   Patient presents with    swallowing issues      Been going on for a while. . comes and goes getting worse     Ear Fullness     Right side      Denies any pain. Pt wants referral to ENT      1. Have you been to the ER, urgent care clinic since your last visit? Hospitalized since your last visit? No    2. Have you seen or consulted any other health care providers outside of the [address] [place] [address] Avenue since your last visit? Include any pap smears or colon screening.  No
not receive appointment or results within 1-2 weeks. Patient expressed understanding with the diagnosis and plan. Follow-up and Dispositions    Return in about 3 months (around 2/17/2024), or if symptoms worsen or fail to improve.          Kassie Canavan, APRN - NP

## 2023-11-26 RX ORDER — LISINOPRIL AND HYDROCHLOROTHIAZIDE 25; 20 MG/1; MG/1
1 TABLET ORAL EVERY MORNING
Qty: 90 TABLET | Refills: 0 | Status: SHIPPED | OUTPATIENT
Start: 2023-11-26

## 2023-12-08 ENCOUNTER — TELEPHONE (OUTPATIENT)
Facility: CLINIC | Age: 63
End: 2023-12-08

## 2023-12-08 NOTE — TELEPHONE ENCOUNTER
Patient following up on referral to ENT. States she has not heard from anyone. Would like an appointment as soon as possible. Will travel to [facility] also. May Bonilla

## 2024-01-01 NOTE — TELEPHONE ENCOUNTER
Ms Hawkins  called this morning regarding her [facility]. She is highly upset and in pain and wants to know what the hold up is. I advised I would put another message in.  She asked for Dr Alex Vela to call her himself. [phone] Statement Selected

## 2024-01-19 ENCOUNTER — OFFICE VISIT (OUTPATIENT)
Age: 64
End: 2024-01-19
Payer: COMMERCIAL

## 2024-01-19 VITALS
TEMPERATURE: 97.6 F | HEART RATE: 86 BPM | SYSTOLIC BLOOD PRESSURE: 115 MMHG | HEIGHT: 62 IN | BODY MASS INDEX: 31.32 KG/M2 | OXYGEN SATURATION: 93 % | RESPIRATION RATE: 17 BRPM | WEIGHT: 170.2 LBS | DIASTOLIC BLOOD PRESSURE: 63 MMHG

## 2024-01-19 DIAGNOSIS — I87.303 CHRONIC VENOUS HYPERTENSION INVOLVING BOTH SIDES: Primary | ICD-10-CM

## 2024-01-19 PROCEDURE — 3078F DIAST BP <80 MM HG: CPT | Performed by: SURGERY

## 2024-01-19 PROCEDURE — 3074F SYST BP LT 130 MM HG: CPT | Performed by: SURGERY

## 2024-01-19 PROCEDURE — 99203 OFFICE O/P NEW LOW 30 MIN: CPT | Performed by: SURGERY

## 2024-01-19 ASSESSMENT — PATIENT HEALTH QUESTIONNAIRE - PHQ9
SUM OF ALL RESPONSES TO PHQ QUESTIONS 1-9: 0
SUM OF ALL RESPONSES TO PHQ9 QUESTIONS 1 & 2: 0
SUM OF ALL RESPONSES TO PHQ QUESTIONS 1-9: 0
SUM OF ALL RESPONSES TO PHQ QUESTIONS 1-9: 0
2. FEELING DOWN, DEPRESSED OR HOPELESS: 0
1. LITTLE INTEREST OR PLEASURE IN DOING THINGS: 0
SUM OF ALL RESPONSES TO PHQ QUESTIONS 1-9: 0

## 2024-01-26 ENCOUNTER — TELEPHONE (OUTPATIENT)
Facility: CLINIC | Age: 64
End: 2024-01-26

## 2024-01-26 PROBLEM — I87.303 CHRONIC VENOUS HYPERTENSION INVOLVING BOTH SIDES: Status: ACTIVE | Noted: 2024-01-26

## 2024-01-26 NOTE — PROGRESS NOTES
Identified pt with two pt identifiers (name and ). Reviewed chart in preparation for visit and have obtained necessary documentation.    Divya SUÁREZ ShaanAlAmbrose is a 63 y.o. female  Chief Complaint   Patient presents with    New Patient     Complains of bilateral leg and ankle swelling      /63 (Site: Left Upper Arm, Position: Sitting)   Pulse 86   Temp 97.6 °F (36.4 °C) (Temporal)   Resp 17   Ht 1.575 m (5' 2\")   Wt 77.2 kg (170 lb 3.2 oz)   SpO2 93%   BMI 31.13 kg/m²         
Pura MILES et al.     Evaluation, treatment, and prevention of vitamin D     deficiency: an Endocrine Society clinical practice     guideline. JCEM. 2011 Jul; 96(7):1911-30.           Images:      Ross Dudley MD    Assessments:  Patient Active Problem List   Diagnosis    Hypercholesteremia    Essential (primary) hypertension    Bipolar disorder, unspecified (HCC)    Sciatica    Low serum potassium    Other chest pain    Recurrent ventral hernia    Anemia    Personal history of colonic polyps    Bowel obstruction (HCC)    Hiatal hernia    Depression    Costochondritis    Prediabetes    Chronic venous hypertension involving both sides       Plans: Arterial examination shows mild changes noted.  Patient may suffering from mild PAD.  But mostly her symptoms may be related from chronic venous hypertension and edema.  Patient does have CEAP C3 venous disorder.  I will schedule for venous duplex ultrasound with insufficiency test as well as JADEN examination.  Meanwhile patient is recommended compression stocking mid thigh level with a pressure gradient of 20 to 30 mmHg.  Patient will be reassessed after these two  test at the vascular lab.          Ross Dudley MD

## 2024-02-02 ENCOUNTER — OFFICE VISIT (OUTPATIENT)
Facility: CLINIC | Age: 64
End: 2024-02-02
Payer: COMMERCIAL

## 2024-02-02 VITALS
RESPIRATION RATE: 20 BRPM | HEIGHT: 62 IN | OXYGEN SATURATION: 99 % | TEMPERATURE: 96.8 F | DIASTOLIC BLOOD PRESSURE: 74 MMHG | SYSTOLIC BLOOD PRESSURE: 134 MMHG | HEART RATE: 101 BPM | WEIGHT: 168.2 LBS | BODY MASS INDEX: 30.95 KG/M2

## 2024-02-02 DIAGNOSIS — M25.50 MULTIPLE JOINT PAIN: Primary | ICD-10-CM

## 2024-02-02 PROCEDURE — 99213 OFFICE O/P EST LOW 20 MIN: CPT | Performed by: NURSE PRACTITIONER

## 2024-02-02 PROCEDURE — 3075F SYST BP GE 130 - 139MM HG: CPT | Performed by: NURSE PRACTITIONER

## 2024-02-02 PROCEDURE — 3078F DIAST BP <80 MM HG: CPT | Performed by: NURSE PRACTITIONER

## 2024-02-02 RX ORDER — PREDNISONE 20 MG/1
TABLET ORAL
Qty: 20 TABLET | Refills: 0 | Status: SHIPPED | OUTPATIENT
Start: 2024-02-02 | End: 2024-02-13

## 2024-02-02 RX ORDER — QUETIAPINE FUMARATE 150 MG/1
1 TABLET, FILM COATED ORAL NIGHTLY
COMMUNITY
Start: 2023-12-08

## 2024-02-02 RX ORDER — ALLOPURINOL 300 MG/1
300 TABLET ORAL DAILY
Qty: 90 TABLET | Refills: 1 | Status: SHIPPED | OUTPATIENT
Start: 2024-02-02

## 2024-02-02 ASSESSMENT — ENCOUNTER SYMPTOMS
SORE THROAT: 0
EYES NEGATIVE: 1
SINUS PAIN: 0
RESPIRATORY NEGATIVE: 1
GASTROINTESTINAL NEGATIVE: 1

## 2024-02-02 NOTE — PROGRESS NOTES
Subjective  Chief Complaint   Patient presents with    Hand Pain     Pt still having issues with her hands, would like to be referred to Arthritis doctor      HPI:  Divya Borrero is a 63 y.o. female with PMH of copd, GERD, bipolar disorder, hypertension and tobacco dependence who presents for follow up  regarding pain in hands,shoulders and legs. States that she did not end up seeing the rheumatologist due to she was not contacted. Pain remains the same as when she was seen in October. Is taking tylenol and ibuprofen but not controlling symptoms, particularly in hands. Hands are stiff and feel like they swell at various times.  Has intermittent tingling but not constant.    Past Medical History:   Diagnosis Date    Anemia 08/16/2022    Arthritis     Asthma     Bipolar disorder, unspecified (HCC) 01/28/2019    Blood in stool 06/28/2017    BLOOD IN STOOL    DR VERDUGO'S NOTE    Bowel obstruction ([facility]) 2014    Chronic obstructive pulmonary disease ([facility])     Per PCP note    Depression 12/13/2022    CHRONIC    Essential (primary) hypertension 01/28/2019    GERD (gastroesophageal reflux disease)     Hiatal hernia 12/13/2022    Hx of small bowel obstruction 2014    [place], [place]    Hypercholesteremia 01/28/2019    Ill-defined condition     vein    Rectal bleeding 11/13/2017    Sciatica 01/28/2019    Sleep apnea     Patient stated does not use a CPAP    Stress incontinence     Varicose veins of both lower extremities     PER PCP NOTE    Ventral hernia         Past Surgical History:   Procedure Laterality Date    COLONOSCOPY  12/16/2014    RECTAL POLYP, DIVERTICULOSIS    COLONOSCOPY  11/17/2017    HX OF COLON POLYPS, DIVERTICULOSIS    HERNIA REPAIR  08/19/2022    REPAIR RECURRENT VENTRAL ABDOMINAL HERNIA, EXCISION OF ABDOMINAL WALL STITCH    HERNIA REPAIR      ventral hernia repair times 3 with mesh    HYSTERECTOMY (CERVIX STATUS UNKNOWN)      partial    ORTHOPEDIC SURGERY      Bunionectomy right great toe

## 2024-02-02 NOTE — PROGRESS NOTES
Chief Complaint   Patient presents with    Hand Pain     Pt still having issues with her hands, would like to be referred to Arthritis doctor      Pt still having soreness in her arm wear she got her flu shot       1. Have you been to the ER, urgent care clinic since your last visit?  Hospitalized since your last visit?No    2. Have you seen or consulted any other health care providers outside of the [facility] since your last visit?  Include any pap smears or colon screening. No

## 2024-02-05 ENCOUNTER — APPOINTMENT (OUTPATIENT)
Facility: HOSPITAL | Age: 64
End: 2024-02-05
Attending: SURGERY
Payer: COMMERCIAL

## 2024-02-05 ENCOUNTER — HOSPITAL ENCOUNTER (OUTPATIENT)
Facility: HOSPITAL | Age: 64
Discharge: HOME | End: 2024-02-08
Payer: COMMERCIAL

## 2024-02-05 ENCOUNTER — HOSPITAL ENCOUNTER (OUTPATIENT)
Facility: HOSPITAL | Age: 64
Discharge: HOME | End: 2024-02-07
Attending: SURGERY
Payer: COMMERCIAL

## 2024-02-05 VITALS — BODY MASS INDEX: 30.91 KG/M2 | WEIGHT: 168 LBS | HEIGHT: 62 IN

## 2024-02-05 DIAGNOSIS — I87.303 CHRONIC VENOUS HYPERTENSION INVOLVING BOTH SIDES: ICD-10-CM

## 2024-02-05 DIAGNOSIS — Z12.31 SCREENING MAMMOGRAM, ENCOUNTER FOR: ICD-10-CM

## 2024-02-05 LAB
ECHO BSA: 1.83 M2
VAS LEFT ABI: 1.34
VAS LEFT ANKLE BP: 162 MMHG
VAS LEFT ARM BP: 114 MMHG
VAS LEFT CALF PRESSURE: 177 MMHG
VAS LEFT DORSALIS PEDIS BP: 156 MMHG
VAS LEFT HIGH THIGH PRESSURE: 185 MMHG
VAS LEFT LOW THIGH PRESSURE: 173 MMHG
VAS LEFT PTA BP: 162 MMHG
VAS LEFT TBI: 0.95
VAS LEFT TOE PRESSURE: 115 MMHG
VAS RIGHT ABI: 1.24
VAS RIGHT ANKLE BP: 150 MMHG
VAS RIGHT ARM BP: 121 MMHG
VAS RIGHT CALF PRESSURE: 159 MMHG
VAS RIGHT DORSALIS PEDIS BP: 144 MMHG
VAS RIGHT HIGH THIGH PRESSURE: 183 MMHG
VAS RIGHT LOW THIGH PRESSURE: 168 MMHG
VAS RIGHT PTA BP: 150 MMHG
VAS RIGHT TBI: 0.95
VAS RIGHT TOE PRESSURE: 115 MMHG

## 2024-02-05 PROCEDURE — 77063 BREAST TOMOSYNTHESIS BI: CPT

## 2024-02-05 PROCEDURE — 93923 UPR/LXTR ART STDY 3+ LVLS: CPT

## 2024-03-01 DIAGNOSIS — E55.9 VITAMIN D DEFICIENCY: ICD-10-CM

## 2024-03-01 RX ORDER — ISOSORBIDE MONONITRATE 30 MG/1
30 TABLET, EXTENDED RELEASE ORAL DAILY
Qty: 90 TABLET | Refills: 0 | Status: SHIPPED | OUTPATIENT
Start: 2024-03-01

## 2024-03-01 RX ORDER — ERGOCALCIFEROL 1.25 MG/1
50000 CAPSULE ORAL WEEKLY
Qty: 8 CAPSULE | Refills: 0 | Status: SHIPPED | OUTPATIENT
Start: 2024-03-01

## 2024-03-21 ENCOUNTER — OFFICE VISIT (OUTPATIENT)
Facility: CLINIC | Age: 64
End: 2024-03-21

## 2024-03-21 VITALS
BODY MASS INDEX: 30.36 KG/M2 | RESPIRATION RATE: 18 BRPM | TEMPERATURE: 98.2 F | HEART RATE: 90 BPM | HEIGHT: 62 IN | DIASTOLIC BLOOD PRESSURE: 69 MMHG | WEIGHT: 165 LBS | SYSTOLIC BLOOD PRESSURE: 108 MMHG | OXYGEN SATURATION: 98 %

## 2024-03-21 DIAGNOSIS — F31.62 BIPOLAR DISORDER, CURRENT EPISODE MIXED, MODERATE (HCC): ICD-10-CM

## 2024-03-21 DIAGNOSIS — E78.00 HYPERCHOLESTEREMIA: ICD-10-CM

## 2024-03-21 DIAGNOSIS — Z12.11 SCREENING FOR COLON CANCER: ICD-10-CM

## 2024-03-21 DIAGNOSIS — G47.33 OBSTRUCTIVE SLEEP APNEA: ICD-10-CM

## 2024-03-21 DIAGNOSIS — I25.83 CORONARY ARTERY DISEASE DUE TO LIPID RICH PLAQUE: ICD-10-CM

## 2024-03-21 DIAGNOSIS — R73.03 PREDIABETES: Primary | ICD-10-CM

## 2024-03-21 DIAGNOSIS — K21.9 GASTRO-ESOPHAGEAL REFLUX DISEASE WITHOUT ESOPHAGITIS: ICD-10-CM

## 2024-03-21 DIAGNOSIS — I10 ESSENTIAL (PRIMARY) HYPERTENSION: ICD-10-CM

## 2024-03-21 DIAGNOSIS — I25.10 CORONARY ARTERY DISEASE DUE TO LIPID RICH PLAQUE: ICD-10-CM

## 2024-03-21 DIAGNOSIS — M10.9 GOUT, UNSPECIFIED CAUSE, UNSPECIFIED CHRONICITY, UNSPECIFIED SITE: ICD-10-CM

## 2024-03-21 DIAGNOSIS — J44.9 CHRONIC OBSTRUCTIVE PULMONARY DISEASE, UNSPECIFIED COPD TYPE (HCC): ICD-10-CM

## 2024-03-21 DIAGNOSIS — E55.9 VITAMIN D DEFICIENCY: ICD-10-CM

## 2024-03-21 DIAGNOSIS — I83.93 VARICOSE VEINS OF BOTH LOWER EXTREMITIES, UNSPECIFIED WHETHER COMPLICATED: ICD-10-CM

## 2024-03-21 DIAGNOSIS — F17.210 CIGARETTE NICOTINE DEPENDENCE WITHOUT COMPLICATION: ICD-10-CM

## 2024-03-21 DIAGNOSIS — T78.40XD ALLERGY, SUBSEQUENT ENCOUNTER: ICD-10-CM

## 2024-03-21 DIAGNOSIS — M25.50 MULTIPLE JOINT PAIN: ICD-10-CM

## 2024-03-21 LAB — HBA1C MFR BLD: 6.2 %

## 2024-03-21 RX ORDER — FLUTICASONE PROPIONATE 50 MCG
2 SPRAY, SUSPENSION (ML) NASAL DAILY
Qty: 16 G | Refills: 2 | Status: SHIPPED | OUTPATIENT
Start: 2024-03-21

## 2024-03-21 ASSESSMENT — ENCOUNTER SYMPTOMS
RESPIRATORY NEGATIVE: 1
SORE THROAT: 0
GASTROINTESTINAL NEGATIVE: 1
SINUS PAIN: 0
EYES NEGATIVE: 1

## 2024-03-21 NOTE — PROGRESS NOTES
Subjective  Chief Complaint   Patient presents with    Diabetes     HPI:  Divya Borrero is a 63 y.o. female with PMH of copd, GERD, bipolar disorder, hypertension, joint pain and tobacco dependence who presents for follow up of chronic conditions. States that she is taking medications as directed. Has been following with psychiatry and counselor since last visit. Reports that she did not end up seeing the rheumatologist after last referred. Still has concerns regarding pain in bilateral hands as well intermittent swelling in joints of hands. Pain in legs and shoulders has been recently better. Experiencing persistent fatigue despite sleeping. Does not think it is related to her psychiatric meds. Does report habitual snoring but has not used a cpap due to difficulty wearing. May want to try again given her fatigue. Denies any recent chest pain, shortness of breath, headaches or dizziness.     Past Medical History:   Diagnosis Date    Anemia 08/16/2022    Arthritis     Asthma     Bipolar disorder, unspecified (HCC) 01/28/2019    Blood in stool 06/28/2017    BLOOD IN STOOL    DR VERDUGO'S NOTE    Bowel obstruction (HCC) 2014    Chronic obstructive pulmonary disease (HCC)     Per PCP note    Depression 12/13/2022    CHRONIC    Essential (primary) hypertension 01/28/2019    GERD (gastroesophageal reflux disease)     Hiatal hernia 12/13/2022    Hx of small bowel obstruction 2014    [place], [place]    Hypercholesteremia 01/28/2019    Ill-defined condition     vein    Rectal bleeding 11/13/2017    Sciatica 01/28/2019    Sleep apnea     Patient stated does not use a CPAP    Stress incontinence     Varicose veins of both lower extremities     PER PCP NOTE    Ventral hernia         Past Surgical History:   Procedure Laterality Date    COLONOSCOPY  12/16/2014    RECTAL POLYP, DIVERTICULOSIS    COLONOSCOPY  11/17/2017    HX OF COLON POLYPS, DIVERTICULOSIS    HERNIA REPAIR  08/19/2022    REPAIR RECURRENT VENTRAL ABDOMINAL

## 2024-03-21 NOTE — PROGRESS NOTES
Chief Complaint   Patient presents with    Diabetes     Follow up     Pt did not bring meds, went over list, pt confirmed       1. Have you been to the ER, urgent care clinic since your last visit?  Hospitalized since your last visit?No    2. Have you seen or consulted any other health care providers outside of the [facility] since your last visit?  Include any pap smears or colon screening. No

## 2024-03-22 RX ORDER — ATORVASTATIN CALCIUM 20 MG/1
20 TABLET, FILM COATED ORAL DAILY
Qty: 90 TABLET | Refills: 0 | Status: SHIPPED | OUTPATIENT
Start: 2024-03-22

## 2024-04-01 ENCOUNTER — TELEPHONE (OUTPATIENT)
Age: 64
End: 2024-04-01

## 2024-04-01 NOTE — TELEPHONE ENCOUNTER
Spoke with patient with two identifiers told her order was changed so she could go to [place] to get vascular duplex done.  She was going to call central scheduling and get it scheduled

## 2024-04-01 NOTE — TELEPHONE ENCOUNTER
Spoke with patient with two identifiers and told her about her appt on 04/08/2024 and that she needed to have her test done before her appt.  She understood and was calling to schedule it and will call us back with the date

## 2024-04-01 NOTE — TELEPHONE ENCOUNTER
Aria from central scheduling calling [phone] calling to let us know vas duplex order was put in incorrectly. Needs to be put in correctly, Call back.

## 2024-04-06 DIAGNOSIS — E55.9 VITAMIN D DEFICIENCY: ICD-10-CM

## 2024-04-07 RX ORDER — ERGOCALCIFEROL 1.25 MG/1
50000 CAPSULE ORAL WEEKLY
Qty: 8 CAPSULE | Refills: 0 | Status: SHIPPED | OUTPATIENT
Start: 2024-04-07

## 2024-04-07 RX ORDER — LISINOPRIL AND HYDROCHLOROTHIAZIDE 25; 20 MG/1; MG/1
1 TABLET ORAL EVERY MORNING
Qty: 90 TABLET | Refills: 1 | Status: SHIPPED | OUTPATIENT
Start: 2024-04-07

## 2024-04-08 ENCOUNTER — TELEPHONE (OUTPATIENT)
Age: 64
End: 2024-04-08

## 2024-04-08 NOTE — TELEPHONE ENCOUNTER
Suzette gave patient wrong info about where to get vas duplex done, [facility] does not do the testing for this.  needs to also fix the order.

## 2024-04-08 NOTE — TELEPHONE ENCOUNTER
Spoke with the patient and she is willing to come to [place] to have imaging done, she requested that central scheduling give her a call to set it up because she \"is tired of doing their job\". I spoke with scheduling and asked that they give her a call and get her scheduled in [place] and they said they would give her a call.

## 2024-04-21 RX ORDER — OMEPRAZOLE 40 MG/1
40 CAPSULE, DELAYED RELEASE ORAL DAILY
Qty: 90 CAPSULE | Refills: 0 | Status: SHIPPED | OUTPATIENT
Start: 2024-04-21

## 2024-04-26 ENCOUNTER — HOSPITAL ENCOUNTER (OUTPATIENT)
Facility: HOSPITAL | Age: 64
End: 2024-04-26
Attending: SURGERY
Payer: COMMERCIAL

## 2024-04-26 DIAGNOSIS — I87.303 CHRONIC VENOUS HYPERTENSION INVOLVING BOTH SIDES: ICD-10-CM

## 2024-04-26 PROCEDURE — 93970 EXTREMITY STUDY: CPT

## 2024-04-27 LAB
VAS LEFT GSV THIGH DIST RFX: 5.9 S
VAS LEFT GSV THIGH PROX RFX: 5.7 S
VAS LEFT GSV THIGHT MID RFX: 6 S
VAS LEFT SSV DIST RFX: 2.3 S
VAS LEFT SSV MID RFX: 1.8 S
VAS LEFT SSV PROX RFX: 2.2 S
VAS RIGHT GSV THIGH DIST RFX: 5.8 S
VAS RIGHT GSV THIGH MID RFX: 5.3 S

## 2024-04-27 PROCEDURE — 93970 EXTREMITY STUDY: CPT | Performed by: SURGERY

## 2024-05-06 ENCOUNTER — OFFICE VISIT (OUTPATIENT)
Age: 64
End: 2024-05-06
Payer: COMMERCIAL

## 2024-05-06 VITALS
HEIGHT: 62 IN | WEIGHT: 170 LBS | DIASTOLIC BLOOD PRESSURE: 75 MMHG | SYSTOLIC BLOOD PRESSURE: 130 MMHG | TEMPERATURE: 98.8 F | RESPIRATION RATE: 20 BRPM | HEART RATE: 88 BPM | OXYGEN SATURATION: 91 % | BODY MASS INDEX: 31.28 KG/M2

## 2024-05-06 DIAGNOSIS — I87.303 CHRONIC VENOUS HYPERTENSION INVOLVING BOTH SIDES: Primary | ICD-10-CM

## 2024-05-06 PROCEDURE — 99212 OFFICE O/P EST SF 10 MIN: CPT | Performed by: SURGERY

## 2024-05-06 PROCEDURE — 3078F DIAST BP <80 MM HG: CPT | Performed by: SURGERY

## 2024-05-06 PROCEDURE — 3075F SYST BP GE 130 - 139MM HG: CPT | Performed by: SURGERY

## 2024-05-06 ASSESSMENT — PATIENT HEALTH QUESTIONNAIRE - PHQ9
SUM OF ALL RESPONSES TO PHQ QUESTIONS 1-9: 2
SUM OF ALL RESPONSES TO PHQ QUESTIONS 1-9: 2
2. FEELING DOWN, DEPRESSED OR HOPELESS: SEVERAL DAYS
SUM OF ALL RESPONSES TO PHQ QUESTIONS 1-9: 2
SUM OF ALL RESPONSES TO PHQ QUESTIONS 1-9: 2
1. LITTLE INTEREST OR PLEASURE IN DOING THINGS: SEVERAL DAYS
SUM OF ALL RESPONSES TO PHQ9 QUESTIONS 1 & 2: 2

## 2024-05-06 NOTE — PROGRESS NOTES
Identified pt with two pt identifiers (name and ). Reviewed chart in preparation for visit and have obtained necessary documentation.    Divya Borrero is a 63 y.o. female Results (F/u test results venus duplex )  .    Vitals:    24 1406   BP: 130/75   Site: Right Upper Arm   Position: Sitting   Cuff Size: Medium Adult   Pulse: 88   Resp: 20   Temp: 98.8 °F (37.1 °C)   TempSrc: Oral   SpO2: 91%   Weight: 77.1 kg (170 lb)   Height: 1.575 m (5' 2\")          1. Have you been to the ER, urgent care clinic since your last visit?  Hospitalized since your last visit?  no     2. Have you seen or consulted any other health care providers outside of the [facility] since your last visit?  Include any pap smears or colon screening.  No    Pt depression stems from being in constant pain.

## 2024-05-09 NOTE — PROGRESS NOTES
Vascular History and Physical    Patient: Divya Borrero  MRN: [id number]    YOB: 1960  Age: 63 y.o.  Sex: female     Chief Complaint:  Chief Complaint   Patient presents with    Results     F/u test results venus duplex        History of Present Illness: Divya Borrero is a 63 y.o. very pleasant woman is here today for follow-up discuss test result.  Patient was diagnosed with chronic venous hypertension and leg pain.  Patient had venous duplex ultrasound insufficiency test as well as JADEN.  Patient was recommended compression stocking last visit.    Social History:  Social Connections: Not on file       Past Medical History:  Past Medical History:   Diagnosis Date    Anemia 08/16/2022    Arthritis     Asthma     Bipolar disorder, unspecified (HCC) 01/28/2019    Blood in stool 06/28/2017    BLOOD IN STOOL    DR VERDUGO'S NOTE    Bowel obstruction (HCC) 2014    Chronic obstructive pulmonary disease (HCC)     Per PCP note    Depression 12/13/2022    CHRONIC    Essential (primary) hypertension 01/28/2019    GERD (gastroesophageal reflux disease)     Hiatal hernia 12/13/2022    Hx of small bowel obstruction 2014    [place], [place]    Hypercholesteremia 01/28/2019    Ill-defined condition     vein    Rectal bleeding 11/13/2017    Sciatica 01/28/2019    Sleep apnea     Patient stated does not use a CPAP    Stress incontinence     Varicose veins of both lower extremities     PER PCP NOTE    Ventral hernia        Surgical History:  Past Surgical History:   Procedure Laterality Date    COLONOSCOPY  12/16/2014    RECTAL POLYP, DIVERTICULOSIS    COLONOSCOPY  11/17/2017    HX OF COLON POLYPS, DIVERTICULOSIS    HERNIA REPAIR  08/19/2022    REPAIR RECURRENT VENTRAL ABDOMINAL HERNIA, EXCISION OF ABDOMINAL WALL STITCH    HERNIA REPAIR      ventral hernia repair times 3 with mesh    HYSTERECTOMY (CERVIX STATUS UNKNOWN)      partial    ORTHOPEDIC SURGERY      Bunionectomy right great toe    OTHER

## 2024-05-15 ENCOUNTER — TELEPHONE (OUTPATIENT)
Age: 64
End: 2024-05-15

## 2024-05-16 ENCOUNTER — OFFICE VISIT (OUTPATIENT)
Age: 64
End: 2024-05-16
Payer: COMMERCIAL

## 2024-05-16 VITALS
TEMPERATURE: 98.3 F | OXYGEN SATURATION: 97 % | HEART RATE: 88 BPM | BODY MASS INDEX: 30.95 KG/M2 | RESPIRATION RATE: 18 BRPM | DIASTOLIC BLOOD PRESSURE: 83 MMHG | WEIGHT: 168.2 LBS | SYSTOLIC BLOOD PRESSURE: 126 MMHG | HEIGHT: 62 IN

## 2024-05-16 DIAGNOSIS — R14.0 ABDOMINAL BLOATING: ICD-10-CM

## 2024-05-16 DIAGNOSIS — D64.9 ANEMIA, UNSPECIFIED TYPE: ICD-10-CM

## 2024-05-16 DIAGNOSIS — Z86.010 PERSONAL HISTORY OF COLONIC POLYPS: Primary | ICD-10-CM

## 2024-05-16 PROCEDURE — 99214 OFFICE O/P EST MOD 30 MIN: CPT | Performed by: INTERNAL MEDICINE

## 2024-05-16 PROCEDURE — 3079F DIAST BP 80-89 MM HG: CPT | Performed by: INTERNAL MEDICINE

## 2024-05-16 PROCEDURE — 3074F SYST BP LT 130 MM HG: CPT | Performed by: INTERNAL MEDICINE

## 2024-05-16 RX ORDER — POLYETHYLENE GLYCOL 3350 17 G/17G
POWDER, FOR SOLUTION ORAL
Qty: 510 G | Refills: 0 | Status: SHIPPED | OUTPATIENT
Start: 2024-05-16

## 2024-05-16 ASSESSMENT — PATIENT HEALTH QUESTIONNAIRE - PHQ9
2. FEELING DOWN, DEPRESSED OR HOPELESS: NOT AT ALL
SUM OF ALL RESPONSES TO PHQ9 QUESTIONS 1 & 2: 0
SUM OF ALL RESPONSES TO PHQ QUESTIONS 1-9: 0
SUM OF ALL RESPONSES TO PHQ QUESTIONS 1-9: 0
1. LITTLE INTEREST OR PLEASURE IN DOING THINGS: NOT AT ALL
SUM OF ALL RESPONSES TO PHQ QUESTIONS 1-9: 0
SUM OF ALL RESPONSES TO PHQ QUESTIONS 1-9: 0

## 2024-05-17 ENCOUNTER — PREP FOR PROCEDURE (OUTPATIENT)
Age: 64
End: 2024-05-17

## 2024-05-17 NOTE — PROGRESS NOTES
Pt seen in the office scheduled a colonoscopy for 6/27/24 at 9:00 am. Discussed CS instructions. Pt verbalized understanding.     [facility] AUTH REQUEST FAXED . PENDING

## 2024-05-27 ASSESSMENT — ENCOUNTER SYMPTOMS
NAUSEA: 0
RECTAL PAIN: 0
DIARRHEA: 0
BACK PAIN: 1
CONSTIPATION: 0
ABDOMINAL PAIN: 1
BLOOD IN STOOL: 0
VOMITING: 0
ANAL BLEEDING: 0
ALLERGIC/IMMUNOLOGIC NEGATIVE: 1
WHEEZING: 1
ABDOMINAL DISTENTION: 0

## 2024-05-28 NOTE — PROGRESS NOTES
Divya SUÁREZ Gissell is a 63 y.o. female who presents today for the following:  Chief Complaint   Patient presents with    Colonoscopy     Pt would like to discuss a colonscopy         Allergies   Allergen Reactions    Latex Itching    Astemizole Anaphylaxis    Caplyta [Lumateperone]      EPS movement disorder       Current Outpatient Medications   Medication Sig Dispense Refill    polyethylene glycol (GLYCOLAX) 17 GM/SCOOP powder Use as directed by physician. 510 g 0    omeprazole (PRILOSEC) 40 MG delayed release capsule Take 1 capsule by mouth once daily 90 capsule 0    vitamin D (ERGOCALCIFEROL) 1.25 MG (97681 UT) CAPS capsule Take 1 capsule by mouth once a week 8 capsule 0    lisinopril-hydroCHLOROthiazide (PRINZIDE;ZESTORETIC) 20-25 MG per tablet TAKE 1 TABLET BY MOUTH ONCE DAILY IN THE MORNING 90 tablet 1    atorvastatin (LIPITOR) 20 MG tablet Take 1 tablet by mouth daily 90 tablet 0    fluticasone (FLONASE) 50 MCG/ACT nasal spray 2 sprays by Each Nostril route daily 16 g 2    isosorbide mononitrate (IMDUR) 30 MG extended release tablet Take 1 tablet by mouth once daily 90 tablet 0    QUEtiapine Fumarate 150 MG TABS Take 1 tablet by mouth nightly at bedtime      allopurinol (ZYLOPRIM) 300 MG tablet Take 1 tablet by mouth daily 90 tablet 1    divalproex (DEPAKOTE ER) 500 MG extended release tablet Take 1 tablet by mouth at bedtime.      ADVAIR DISKUS 250-50 MCG/ACT AEPB diskus inhaler INHALE 1 DOSE BY MOUTH EVERY 12 HOURS 180 each 0    albuterol sulfate HFA (PROVENTIL;VENTOLIN;PROAIR) 108 (90 Base) MCG/ACT inhaler INHALE 2 PUFFS BY MOUTH EVERY 4 TO 6 HOURS AS NEEDED 18 g 3    ferrous sulfate (FE TABS 325) 325 (65 Fe) MG EC tablet TAKE 1 TABLET BY MOUTH ONCE DAILY BEFORE BREAKFAST      ipratropium 0.5 mg-albuterol 2.5 mg (DUONEB) 0.5-2.5 (3) MG/3ML SOLN nebulizer solution Inhale 3 mLs into the lungs every 4 hours as needed for Shortness of Breath 360 mL 1    hydrOXYzine HCl (ATARAX) 25 MG tablet Take 1 tablet

## 2024-06-05 ENCOUNTER — OFFICE VISIT (OUTPATIENT)
Facility: CLINIC | Age: 64
End: 2024-06-05
Payer: COMMERCIAL

## 2024-06-05 VITALS
HEART RATE: 98 BPM | SYSTOLIC BLOOD PRESSURE: 139 MMHG | BODY MASS INDEX: 30.47 KG/M2 | OXYGEN SATURATION: 99 % | DIASTOLIC BLOOD PRESSURE: 84 MMHG | HEIGHT: 62 IN | RESPIRATION RATE: 18 BRPM | WEIGHT: 165.6 LBS | TEMPERATURE: 98.1 F

## 2024-06-05 DIAGNOSIS — U07.1 COVID-19: Primary | ICD-10-CM

## 2024-06-05 LAB
EXP DATE SOLUTION: NORMAL
EXP DATE SWAB: NORMAL
EXPIRATION DATE: NORMAL
LOT NUMBER POC: NORMAL
LOT NUMBER SOLUTION: NORMAL
LOT NUMBER SWAB: NORMAL
SARS-COV-2 RNA, POC: POSITIVE

## 2024-06-05 PROCEDURE — 3075F SYST BP GE 130 - 139MM HG: CPT | Performed by: NURSE PRACTITIONER

## 2024-06-05 PROCEDURE — 87635 SARS-COV-2 COVID-19 AMP PRB: CPT | Performed by: NURSE PRACTITIONER

## 2024-06-05 PROCEDURE — 3079F DIAST BP 80-89 MM HG: CPT | Performed by: NURSE PRACTITIONER

## 2024-06-05 PROCEDURE — 99213 OFFICE O/P EST LOW 20 MIN: CPT | Performed by: NURSE PRACTITIONER

## 2024-06-05 RX ORDER — PREDNISONE 20 MG/1
TABLET ORAL
Qty: 9 TABLET | Refills: 0 | Status: SHIPPED | OUTPATIENT
Start: 2024-06-05 | End: 2024-06-10

## 2024-06-05 SDOH — ECONOMIC STABILITY: HOUSING INSECURITY
IN THE LAST 12 MONTHS, WAS THERE A TIME WHEN YOU DID NOT HAVE A STEADY PLACE TO SLEEP OR SLEPT IN A SHELTER (INCLUDING NOW)?: NO

## 2024-06-05 SDOH — ECONOMIC STABILITY: INCOME INSECURITY: HOW HARD IS IT FOR YOU TO PAY FOR THE VERY BASICS LIKE FOOD, HOUSING, MEDICAL CARE, AND HEATING?: NOT HARD AT ALL

## 2024-06-05 SDOH — ECONOMIC STABILITY: FOOD INSECURITY: WITHIN THE PAST 12 MONTHS, THE FOOD YOU BOUGHT JUST DIDN'T LAST AND YOU DIDN'T HAVE MONEY TO GET MORE.: NEVER TRUE

## 2024-06-05 SDOH — ECONOMIC STABILITY: FOOD INSECURITY: WITHIN THE PAST 12 MONTHS, YOU WORRIED THAT YOUR FOOD WOULD RUN OUT BEFORE YOU GOT MONEY TO BUY MORE.: NEVER TRUE

## 2024-06-05 ASSESSMENT — ENCOUNTER SYMPTOMS
NAUSEA: 0
VOMITING: 0
SHORTNESS OF BREATH: 0
SINUS PAIN: 1
WHEEZING: 0
SINUS PRESSURE: 0
DIARRHEA: 0
COUGH: 1
CHEST TIGHTNESS: 0
ABDOMINAL PAIN: 0
SORE THROAT: 0

## 2024-06-05 NOTE — PROGRESS NOTES
Subjective  Chief Complaint   Patient presents with    Sinus Problem    Cough    Chest Congestion     HPI:  Divya Borrero is a 63 y.o. female who presents with concerns regarding 4 days of cough, congestion, sinus pain and chills. States that she was in contact with someone last week who had similar symptoms. Denies any known fever but did have chills when symptoms began. Appetite and activity have been decreased.       Past Medical History:   Diagnosis Date    Anemia 08/16/2022    Arthritis     Asthma     Bipolar disorder, unspecified (HCC) 01/28/2019    Blood in stool 06/28/2017    BLOOD IN STOOL    DR VERDUGO'S NOTE    Bowel obstruction (HCC) 2014    Chronic obstructive pulmonary disease (HCC)     Per PCP note    Depression 12/13/2022    CHRONIC    Essential (primary) hypertension 01/28/2019    GERD (gastroesophageal reflux disease)     Hiatal hernia 12/13/2022    Hx of small bowel obstruction 2014    [place]    Hypercholesteremia 01/28/2019    Ill-defined condition     vein    Rectal bleeding 11/13/2017    Sciatica 01/28/2019    Sleep apnea     Patient stated does not use a CPAP    Stress incontinence     Varicose veins of both lower extremities     PER PCP NOTE    Ventral hernia         Past Surgical History:   Procedure Laterality Date    COLONOSCOPY  12/16/2014    RECTAL POLYP, DIVERTICULOSIS    COLONOSCOPY  11/17/2017    HX OF COLON POLYPS, DIVERTICULOSIS    HERNIA REPAIR  08/19/2022    REPAIR RECURRENT VENTRAL ABDOMINAL HERNIA, EXCISION OF ABDOMINAL WALL STITCH    HERNIA REPAIR      ventral hernia repair times 3 with mesh    HYSTERECTOMY (CERVIX STATUS UNKNOWN)      partial    ORTHOPEDIC SURGERY      Bunionectomy right great toe    OTHER SURGICAL HISTORY      Epiglottis repair due to allergic reaction        Family History   Problem Relation Age of Onset    Hypertension Father     Diabetes Mother     Hypertension Mother     High Cholesterol Other         Social History     Socioeconomic

## 2024-06-05 NOTE — PROGRESS NOTES
Chief Complaint   Patient presents with    Sinus Problem    Cough    Chest Congestion             \"Have you been to the ER, urgent care clinic since your last visit?  Hospitalized since your last visit?\"    NO    “Have you seen or consulted any other health care providers outside of [facility] since your last visit?”    NO            Click Here for Release of Records Request

## 2024-06-05 NOTE — H&P (VIEW-ONLY)
Subjective  Chief Complaint   Patient presents with    Sinus Problem    Cough    Chest Congestion     HPI:  Divya Borrero is a 63 y.o. female who presents with concerns regarding 4 days of cough, congestion, sinus pain and chills. States that she was in contact with someone last week who had similar symptoms. Denies any known fever but did have chills when symptoms began. Appetite and activity have been decreased.       Past Medical History:   Diagnosis Date    Anemia 08/16/2022    Arthritis     Asthma     Bipolar disorder, unspecified (HCC) 01/28/2019    Blood in stool 06/28/2017    BLOOD IN STOOL    DR VERDUGO'S NOTE    Bowel obstruction (HCC) 2014    Chronic obstructive pulmonary disease (HCC)     Per PCP note    Depression 12/13/2022    CHRONIC    Essential (primary) hypertension 01/28/2019    GERD (gastroesophageal reflux disease)     Hiatal hernia 12/13/2022    Hx of small bowel obstruction 2014    [place]    Hypercholesteremia 01/28/2019    Ill-defined condition     vein    Rectal bleeding 11/13/2017    Sciatica 01/28/2019    Sleep apnea     Patient stated does not use a CPAP    Stress incontinence     Varicose veins of both lower extremities     PER PCP NOTE    Ventral hernia         Past Surgical History:   Procedure Laterality Date    COLONOSCOPY  12/16/2014    RECTAL POLYP, DIVERTICULOSIS    COLONOSCOPY  11/17/2017    HX OF COLON POLYPS, DIVERTICULOSIS    HERNIA REPAIR  08/19/2022    REPAIR RECURRENT VENTRAL ABDOMINAL HERNIA, EXCISION OF ABDOMINAL WALL STITCH    HERNIA REPAIR      ventral hernia repair times 3 with mesh    HYSTERECTOMY (CERVIX STATUS UNKNOWN)      partial    ORTHOPEDIC SURGERY      Bunionectomy right great toe    OTHER SURGICAL HISTORY      Epiglottis repair due to allergic reaction        Family History   Problem Relation Age of Onset    Hypertension Father     Diabetes Mother     Hypertension Mother     High Cholesterol Other         Social History     Socioeconomic  TABS Take 1 tablet by mouth nightly at bedtime      allopurinol (ZYLOPRIM) 300 MG tablet Take 1 tablet by mouth daily 90 tablet 1    divalproex (DEPAKOTE ER) 500 MG extended release tablet Take 1 tablet by mouth at bedtime.      ADVAIR DISKUS 250-50 MCG/ACT AEPB diskus inhaler INHALE 1 DOSE BY MOUTH EVERY 12 HOURS 180 each 0    albuterol sulfate HFA (PROVENTIL;VENTOLIN;PROAIR) 108 (90 Base) MCG/ACT inhaler INHALE 2 PUFFS BY MOUTH EVERY 4 TO 6 HOURS AS NEEDED 18 g 3    ferrous sulfate (FE TABS 325) 325 (65 Fe) MG EC tablet TAKE 1 TABLET BY MOUTH ONCE DAILY BEFORE BREAKFAST      ipratropium 0.5 mg-albuterol 2.5 mg (DUONEB) 0.5-2.5 (3) MG/3ML SOLN nebulizer solution Inhale 3 mLs into the lungs every 4 hours as needed for Shortness of Breath 360 mL 1    hydrOXYzine HCl (ATARAX) 25 MG tablet Take 1 tablet by mouth daily       No current facility-administered medications on file prior to visit.       Orders Placed This Encounter   Medications    nirmatrelvir/ritonavir 300/100 (PAXLOVID, 300/100,) 20 x 150 MG & 10 x 100MG TBPK     Sig: Take 3 tablets (two 150 mg nirmatrelvir and one 100 mg ritonavir tablets) by mouth every 12 hours for 5 days.     Dispense:  30 tablet     Refill:  0     Order Specific Question:   Does this patient qualify for COVID-19 antIviral therapy based on criteria for treatment?     Answer:   Yes    predniSONE (DELTASONE) 20 MG tablet     Sig: Take 2 tablets by mouth daily for 3 days, THEN 1 tablet daily for 3 days.     Dispense:  9 tablet     Refill:  0        Allergies   Allergen Reactions    Latex Itching    Astemizole Anaphylaxis    Caplyta [Lumateperone]      EPS movement disorder       Review of Systems   Constitutional:  Positive for activity change, appetite change, chills and fatigue. Negative for fever.   HENT:  Positive for congestion and sinus pain. Negative for ear pain, sinus pressure, sneezing and sore throat.    Respiratory:  Positive for cough. Negative for chest tightness, shortness

## 2024-06-10 ENCOUNTER — TELEPHONE (OUTPATIENT)
Facility: CLINIC | Age: 64
End: 2024-06-10

## 2024-06-11 ENCOUNTER — PATIENT MESSAGE (OUTPATIENT)
Facility: CLINIC | Age: 64
End: 2024-06-11

## 2024-06-11 DIAGNOSIS — M25.50 MULTIPLE JOINT PAIN: Primary | ICD-10-CM

## 2024-06-13 RX ORDER — ISOSORBIDE MONONITRATE 30 MG/1
30 TABLET, EXTENDED RELEASE ORAL DAILY
Qty: 90 TABLET | Refills: 0 | Status: SHIPPED | OUTPATIENT
Start: 2024-06-13

## 2024-06-13 RX ORDER — UREA 10 %
LOTION (ML) TOPICAL
Qty: 90 TABLET | Refills: 0 | Status: SHIPPED | OUTPATIENT
Start: 2024-06-13

## 2024-06-27 ENCOUNTER — ANESTHESIA EVENT (OUTPATIENT)
Facility: HOSPITAL | Age: 64
End: 2024-06-27
Payer: COMMERCIAL

## 2024-06-27 ENCOUNTER — HOSPITAL ENCOUNTER (OUTPATIENT)
Facility: HOSPITAL | Age: 64
Setting detail: OUTPATIENT SURGERY
Discharge: HOME | End: 2024-06-27
Attending: INTERNAL MEDICINE | Admitting: INTERNAL MEDICINE
Payer: COMMERCIAL

## 2024-06-27 ENCOUNTER — ANESTHESIA (OUTPATIENT)
Facility: HOSPITAL | Age: 64
End: 2024-06-27
Payer: COMMERCIAL

## 2024-06-27 VITALS
HEIGHT: 61 IN | HEART RATE: 79 BPM | RESPIRATION RATE: 20 BRPM | OXYGEN SATURATION: 100 % | BODY MASS INDEX: 31.89 KG/M2 | TEMPERATURE: 97.3 F | WEIGHT: 168.9 LBS | DIASTOLIC BLOOD PRESSURE: 59 MMHG | SYSTOLIC BLOOD PRESSURE: 105 MMHG

## 2024-06-27 PROCEDURE — 88305 TISSUE EXAM BY PATHOLOGIST: CPT

## 2024-06-27 PROCEDURE — 7100000010 HC PHASE II RECOVERY - FIRST 15 MIN: Performed by: INTERNAL MEDICINE

## 2024-06-27 PROCEDURE — 7100000011 HC PHASE II RECOVERY - ADDTL 15 MIN: Performed by: INTERNAL MEDICINE

## 2024-06-27 PROCEDURE — 3700000001 HC ADD 15 MINUTES (ANESTHESIA): Performed by: INTERNAL MEDICINE

## 2024-06-27 PROCEDURE — 6360000002 HC RX W HCPCS: Performed by: NURSE ANESTHETIST, CERTIFIED REGISTERED

## 2024-06-27 PROCEDURE — 2580000003 HC RX 258: Performed by: INTERNAL MEDICINE

## 2024-06-27 PROCEDURE — 45380 COLONOSCOPY AND BIOPSY: CPT | Performed by: INTERNAL MEDICINE

## 2024-06-27 PROCEDURE — 3700000000 HC ANESTHESIA ATTENDED CARE: Performed by: INTERNAL MEDICINE

## 2024-06-27 PROCEDURE — 2500000003 HC RX 250 WO HCPCS: Performed by: NURSE ANESTHETIST, CERTIFIED REGISTERED

## 2024-06-27 PROCEDURE — 3600007512: Performed by: INTERNAL MEDICINE

## 2024-06-27 PROCEDURE — 2709999900 HC NON-CHARGEABLE SUPPLY: Performed by: INTERNAL MEDICINE

## 2024-06-27 PROCEDURE — 3600007502: Performed by: INTERNAL MEDICINE

## 2024-06-27 RX ORDER — GLYCOPYRROLATE 0.2 MG/ML
INJECTION INTRAMUSCULAR; INTRAVENOUS PRN
Status: DISCONTINUED | OUTPATIENT
Start: 2024-06-27 | End: 2024-06-27 | Stop reason: SDUPTHER

## 2024-06-27 RX ORDER — PROPOFOL 10 MG/ML
INJECTION, EMULSION INTRAVENOUS PRN
Status: DISCONTINUED | OUTPATIENT
Start: 2024-06-27 | End: 2024-06-27 | Stop reason: SDUPTHER

## 2024-06-27 RX ORDER — SODIUM CHLORIDE 9 MG/ML
INJECTION, SOLUTION INTRAVENOUS CONTINUOUS
Status: DISCONTINUED | OUTPATIENT
Start: 2024-06-27 | End: 2024-06-27 | Stop reason: HOSPADM

## 2024-06-27 RX ORDER — SODIUM CHLORIDE 9 MG/ML
25 INJECTION, SOLUTION INTRAVENOUS PRN
Status: DISCONTINUED | OUTPATIENT
Start: 2024-06-27 | End: 2024-06-27 | Stop reason: HOSPADM

## 2024-06-27 RX ORDER — LIDOCAINE HYDROCHLORIDE 20 MG/ML
INJECTION, SOLUTION EPIDURAL; INFILTRATION; INTRACAUDAL; PERINEURAL PRN
Status: DISCONTINUED | OUTPATIENT
Start: 2024-06-27 | End: 2024-06-27 | Stop reason: SDUPTHER

## 2024-06-27 RX ADMIN — GLYCOPYRROLATE 0.2 MG: 0.2 INJECTION INTRAMUSCULAR; INTRAVENOUS at 10:23

## 2024-06-27 RX ADMIN — PROPOFOL 100 MG: 10 INJECTION, EMULSION INTRAVENOUS at 10:40

## 2024-06-27 RX ADMIN — PROPOFOL 50 MG: 10 INJECTION, EMULSION INTRAVENOUS at 10:32

## 2024-06-27 RX ADMIN — SODIUM CHLORIDE: 9 INJECTION, SOLUTION INTRAVENOUS at 08:31

## 2024-06-27 RX ADMIN — SODIUM CHLORIDE: 9 INJECTION, SOLUTION INTRAVENOUS at 10:20

## 2024-06-27 RX ADMIN — LIDOCAINE HYDROCHLORIDE 60 MG: 20 INJECTION, SOLUTION EPIDURAL; INFILTRATION; INTRACAUDAL; PERINEURAL at 10:23

## 2024-06-27 RX ADMIN — PROPOFOL 30 MG: 10 INJECTION, EMULSION INTRAVENOUS at 10:36

## 2024-06-27 RX ADMIN — PROPOFOL 50 MG: 10 INJECTION, EMULSION INTRAVENOUS at 10:43

## 2024-06-27 ASSESSMENT — PAIN - FUNCTIONAL ASSESSMENT
PAIN_FUNCTIONAL_ASSESSMENT: 0-10

## 2024-06-27 NOTE — ANESTHESIA POSTPROCEDURE EVALUATION
Department of Anesthesiology  Postprocedure Note    Patient: Divya Borrero  MRN: [id number]  YOB: 1960  Date of evaluation: 6/27/2024    Procedure Summary       Date: 06/27/24 Room / Location: [facility] ENDO 01 / [facility] ENDOSCOPY    Anesthesia Start: 1024 Anesthesia Stop: 1055    Procedure: COLONOSCOPY biopsy (Anus) Diagnosis:       Personal history of colonic polyps      (Personal history of colonic polyps [Z86.010])    Surgeons: Dg Butcher Jr., MD Responsible Provider: Orlando Marquez APRN - CRNA    Anesthesia Type: MAC ASA Status: 3            Anesthesia Type: MAC    Elenita Phase I: Elenita Score: 10    Elenita Phase II:      Anesthesia Post Evaluation    Patient location during evaluation: bedside  Patient participation: complete - patient participated  Level of consciousness: sleepy but conscious  Pain score: 0  Airway patency: patent  Nausea & Vomiting: no vomiting and no nausea  Cardiovascular status: blood pressure returned to baseline  Respiratory status: room air  Hydration status: stable    No notable events documented.

## 2024-06-27 NOTE — ANESTHESIA PRE PROCEDURE
Department of Anesthesiology  Preprocedure Note       Name:  Divya Borrero   Age:  63 y.o.  :  1960                                          MRN:  [id number]         Date:  2024      Surgeon: Surgeon(s):  Dg Butcher Jr., MD    Procedure: Procedure(s):  COLONOSCOPY    Medications prior to admission:   Prior to Admission medications    Medication Sig Start Date End Date Taking? Authorizing Provider   ferrous sulfate (FE TABS 325) 325 (65 Fe) MG EC tablet TAKE 1 TABLET BY MOUTH ONCE DAILY BEFORE BREAKFAST 24   Cecilia Castro APRN - NP   isosorbide mononitrate (IMDUR) 30 MG extended release tablet Take 1 tablet by mouth once daily 24   Cecilia Castro APRN - NP   polyethylene glycol (GLYCOLAX) 17 GM/SCOOP powder Use as directed by physician. 24   Dg Butcher Jr., MD   omeprazole (PRILOSEC) 40 MG delayed release capsule Take 1 capsule by mouth once daily 24   Cecilia Castro APRN - NP   vitamin D (ERGOCALCIFEROL) 1.25 MG (06516 UT) CAPS capsule Take 1 capsule by mouth once a week 24   Cecilia Castro APRN - NP   lisinopril-hydroCHLOROthiazide (PRINZIDE;ZESTORETIC) 20-25 MG per tablet TAKE 1 TABLET BY MOUTH ONCE DAILY IN THE MORNING 24   Cecilia Castro APRN - NP   atorvastatin (LIPITOR) 20 MG tablet Take 1 tablet by mouth daily 3/22/24   Cecilia Castro APRN - NP   fluticasone (FLONASE) 50 MCG/ACT nasal spray 2 sprays by Each Nostril route daily 3/21/24   Cecilia Castro APRN - NP   QUEtiapine Fumarate 150 MG TABS Take 1 tablet by mouth nightly at bedtime 23   Tatianna Hewitt MD   allopurinol (ZYLOPRIM) 300 MG tablet Take 1 tablet by mouth daily 24   Cecilia Castro APRN - NP   divalproex (DEPAKOTE ER) 500 MG extended release tablet Take 1 tablet by mouth at bedtime. 11/3/23   Provider, Historical, MD   ADVAIR DISKUS 250-50 MCG/ACT AEPB diskus inhaler INHALE 1 DOSE BY MOUTH EVERY 12

## 2024-06-27 NOTE — OP NOTE
Colonoscopy Procedure Note      Patient: Divya Borrero MRN: [id number]  SSN: [id number]    YOB: 1960  Age: 63 y.o.  Sex: female      Date of Procedure: 6/27/2024  Date/Time:  6/27/2024 10:54 AM       IMPRESSION:     1.  Rectal polyp   2.  Sigmoid diverticulosis         RECOMMENDATIONS:     1) Check biopsy results.  2) Await pathology report. Call me in 2 weeks if you have not received any information from my office regarding your results.  3) Repeat colonoscopy in 2 to 3 years or as determined by the pathology report.       INDICATION: History of colon polyps    PROCEDURE PERFORMED: Colonoscopy with cold biopsies     DESCRIPTION OF PROCEDURE: An informed consent was obtained.  The patient was placed in left lateral position.  Perianal inspection and a digital rectal exam was performed.  Video colonoscope was introduced into the rectum and advanced under direct vision up to the terminal ileum.  With adequate insufflation and maneuvering of the withdrawing scope, the colonic mucosa was visualized carefully.  Retroflexion was performed in the rectum to see the anorectum and also in the ascending colon to look behind the folds.  Vital signs, pulse oximetry, single lead cardiac monitor were monitored throughout the procedure as the sedation was titrated to the desired effect ensuring patient comfort and safety.  The patient tolerated the procedure very well and was transferred to the recovery area. Following is the summary of findings: In the rectum is a polyp measuring 0.4 cm that was removed via cold biopsies.  Patient diverticulosis involving the sigmoid colon.  It was moderate in size and number.      ENDOSCOPIST: Dg Butcher Jr, MD     ENDOSCOPE: Olympus video colonoscope     ASSISTANT:Circulator: Shivam Carbone RN              Scrub Person First: Cathleen Alexander     ANESTHESIA: TIVA      QUALITY OF PREPARATION: Good      FINDINGS:   Rectal polyp  Sigmoid

## 2024-06-27 NOTE — DISCHARGE INSTRUCTIONS
For the next 12 hours you should not:   1. drive   2. drink alcohol   3. operate any machinery   4. engage in activities that require mental sharpness or manual dexterity such as     cooking   5. take any drugs other than those prescribed by a physician   6. make any legal or financial decisions    Call your doctor's office immediately, if there is is anything unusual:   1. increased and continuing rectal bleeding   2. fever   3. Unusual abdominal pain    Take it easy today and resume normal activity tomorrow.It is common to have gas and mild bloating for a few hours. Pain is NOT normal. You may be groggy off and on for a few hours.    Resume previous diet.        Dg Butcher Jr, MD  6/27/2024  10:58 AM

## 2024-06-27 NOTE — INTERVAL H&P NOTE
Update History & Physical    The patient's History and Physical of June 27, 2024 was reviewed with the patient and I examined the patient. There was no change. The surgical site was confirmed by the patient and me.     Plan: The risks, benefits, expected outcome, and alternative to the recommended procedure have been discussed with the patient. Patient understands and wants to proceed with the procedure.     Electronically signed by Dg Butcher Jr, MD on 6/27/2024 at 10:18 AM

## 2024-07-11 ENCOUNTER — TELEPHONE (OUTPATIENT)
Age: 64
End: 2024-07-11

## 2024-07-20 DIAGNOSIS — E55.9 VITAMIN D DEFICIENCY: ICD-10-CM

## 2024-07-21 RX ORDER — ERGOCALCIFEROL 1.25 MG/1
50000 CAPSULE ORAL WEEKLY
Qty: 8 CAPSULE | Refills: 0 | Status: SHIPPED | OUTPATIENT
Start: 2024-07-21

## 2024-07-25 NOTE — PROGRESS NOTES
Chart review completed all necessary documentation is present.  Identified patient with two patient identifiers (name and ). Reviewed chart in preparation for visit and have obtained necessary documentation.    Divya GardunoAlAmbrose is a 63 y.o. female  Chief Complaint   Patient presents with    Post-Op Check     Hernia repair     BP (!) 159/82 (Site: Left Upper Arm, Position: Sitting, Cuff Size: Small Adult)   Pulse 71   Temp 98.5 °F (36.9 °C) (Oral)   Resp 18   Ht 1.549 m (5' 1\")   Wt 78.8 kg (173 lb 12.8 oz)   SpO2 96%   BMI 32.84 kg/m²     1. Have you been to the ER, urgent care clinic since your last visit?  Hospitalized since your last visit?no    2. Have you seen or consulted any other health care providers outside of the [facility] since your last visit?  Include any pap smears or colon screening. No  Patient and provider made aware of elevated BP x2. Patient asymptomatic. Patient reminded to monitor BP, continue to take BP medications if prescribed, and follow up with PCP/Cardiologist.  Patient expressed understanding and agreement.

## 2024-07-29 ENCOUNTER — OFFICE VISIT (OUTPATIENT)
Age: 64
End: 2024-07-29
Payer: COMMERCIAL

## 2024-07-29 VITALS
SYSTOLIC BLOOD PRESSURE: 159 MMHG | HEART RATE: 71 BPM | DIASTOLIC BLOOD PRESSURE: 82 MMHG | BODY MASS INDEX: 32.81 KG/M2 | OXYGEN SATURATION: 96 % | HEIGHT: 61 IN | TEMPERATURE: 98.5 F | RESPIRATION RATE: 18 BRPM | WEIGHT: 173.8 LBS

## 2024-07-29 DIAGNOSIS — Z98.890 S/P HERNIA REPAIR: ICD-10-CM

## 2024-07-29 DIAGNOSIS — R10.31 RIGHT LOWER QUADRANT ABDOMINAL PAIN: ICD-10-CM

## 2024-07-29 DIAGNOSIS — R13.10 DYSPHAGIA, UNSPECIFIED TYPE: Primary | ICD-10-CM

## 2024-07-29 DIAGNOSIS — Z87.19 S/P HERNIA REPAIR: ICD-10-CM

## 2024-07-29 PROCEDURE — 99213 OFFICE O/P EST LOW 20 MIN: CPT | Performed by: SURGERY

## 2024-07-29 PROCEDURE — 3078F DIAST BP <80 MM HG: CPT | Performed by: SURGERY

## 2024-07-29 PROCEDURE — 3077F SYST BP >= 140 MM HG: CPT | Performed by: SURGERY

## 2024-07-29 ASSESSMENT — PATIENT HEALTH QUESTIONNAIRE - PHQ9
4. FEELING TIRED OR HAVING LITTLE ENERGY: NEARLY EVERY DAY
3. TROUBLE FALLING OR STAYING ASLEEP: NEARLY EVERY DAY
1. LITTLE INTEREST OR PLEASURE IN DOING THINGS: NEARLY EVERY DAY
2. FEELING DOWN, DEPRESSED OR HOPELESS: NEARLY EVERY DAY
SUM OF ALL RESPONSES TO PHQ9 QUESTIONS 1 & 2: 6
8. MOVING OR SPEAKING SO SLOWLY THAT OTHER PEOPLE COULD HAVE NOTICED. OR THE OPPOSITE, BEING SO FIGETY OR RESTLESS THAT YOU HAVE BEEN MOVING AROUND A LOT MORE THAN USUAL: NOT AT ALL
SUM OF ALL RESPONSES TO PHQ QUESTIONS 1-9: 13
9. THOUGHTS THAT YOU WOULD BE BETTER OFF DEAD, OR OF HURTING YOURSELF: NOT AT ALL
10. IF YOU CHECKED OFF ANY PROBLEMS, HOW DIFFICULT HAVE THESE PROBLEMS MADE IT FOR YOU TO DO YOUR WORK, TAKE CARE OF THINGS AT HOME, OR GET ALONG WITH OTHER PEOPLE: VERY DIFFICULT
SUM OF ALL RESPONSES TO PHQ QUESTIONS 1-9: 13
5. POOR APPETITE OR OVEREATING: SEVERAL DAYS
SUM OF ALL RESPONSES TO PHQ QUESTIONS 1-9: 13
7. TROUBLE CONCENTRATING ON THINGS, SUCH AS READING THE NEWSPAPER OR WATCHING TELEVISION: NOT AT ALL
SUM OF ALL RESPONSES TO PHQ QUESTIONS 1-9: 13

## 2024-07-29 NOTE — PROGRESS NOTES
[facility]      Clinic Note - Follow up    Subjective     Divya Borrero returns for scheduled follow up today.  She has a recurrent ventral hernia repair 08/19/2022 by Dr. Tse. She had 3 prior hernia repairs in [place] prior to surgery with [facility] 08/09/2022.   She Feels like food is getting stuck for 3 years both solid and liquid  She says she has had RLQ abdominal pain since 2019 since her hernia surgery; at night only 10/10 when present then moves to RUQ Currently she does not have abdominal pain. She has had extensive workup for this including CT scan and Ultrasound (below)  She denies nausea, vomiting, diarrhea, constipation, blood per rectum, melena, chest pain, shortness of breath, fever, chills    Objective     BP (!) 159/82 (Site: Left Upper Arm, Position: Sitting, Cuff Size: Small Adult)   Pulse 71   Temp 98.5 °F (36.9 °C) (Oral)   Resp 18   Ht 1.549 m (5' 1\")   Wt 78.8 kg (173 lb 12.8 oz)   SpO2 96%   BMI 32.84 kg/m²       PE  GEN - Awake, alert, communicating appropriately.  NAD  Pulm - NWAB  CV - RRR  Abd - soft, NT, ND.        Imaging  US Result (most recent):  US ABDOMEN LIMITED 05/05/2023    Narrative  EXAM: US ABD LTD    INDICATION: Right upper quadrant abdominal pain. No recent laboratory values.    COMPARISON: CT abdomen on 1/18/2023 7/20/2021.    TECHNIQUE: Limited abdominal ultrasound.    FINDINGS:    Liver: The echogenicity of the liver is increased. This is a nonspecific finding  but is most commonly seen with fatty infiltration of the liver. There are no  obvious focal liver lesions.    Main portal vein flow: Hepatopetal and within normal limits.    Fluid: No ascites.    Gallbladder: Within normal limits. No gallstones. No gallbladder wall thickening  or pericholecystic fluid. Negative sonographic Uriostegui sign.    Bile ducts: There is no intra or extrahepatic biliary ductal dilatation.  The  visible extrahepatic bile duct measures 4 mm.    Pancreas:

## 2024-08-06 ENCOUNTER — OFFICE VISIT (OUTPATIENT)
Facility: CLINIC | Age: 64
End: 2024-08-06
Payer: COMMERCIAL

## 2024-08-06 VITALS
HEART RATE: 93 BPM | RESPIRATION RATE: 18 BRPM | DIASTOLIC BLOOD PRESSURE: 69 MMHG | SYSTOLIC BLOOD PRESSURE: 123 MMHG | WEIGHT: 172.6 LBS | TEMPERATURE: 97.3 F | HEIGHT: 61 IN | BODY MASS INDEX: 32.59 KG/M2 | OXYGEN SATURATION: 97 %

## 2024-08-06 DIAGNOSIS — I10 ESSENTIAL (PRIMARY) HYPERTENSION: ICD-10-CM

## 2024-08-06 DIAGNOSIS — E78.00 HYPERCHOLESTEREMIA: ICD-10-CM

## 2024-08-06 DIAGNOSIS — E55.9 VITAMIN D DEFICIENCY: ICD-10-CM

## 2024-08-06 DIAGNOSIS — K21.9 GASTRO-ESOPHAGEAL REFLUX DISEASE WITHOUT ESOPHAGITIS: ICD-10-CM

## 2024-08-06 DIAGNOSIS — I25.10 CORONARY ARTERY DISEASE DUE TO LIPID RICH PLAQUE: ICD-10-CM

## 2024-08-06 DIAGNOSIS — I83.93 VARICOSE VEINS OF BOTH LOWER EXTREMITIES, UNSPECIFIED WHETHER COMPLICATED: ICD-10-CM

## 2024-08-06 DIAGNOSIS — J44.9 CHRONIC OBSTRUCTIVE PULMONARY DISEASE, UNSPECIFIED COPD TYPE (HCC): ICD-10-CM

## 2024-08-06 DIAGNOSIS — T78.40XD ALLERGY, SUBSEQUENT ENCOUNTER: ICD-10-CM

## 2024-08-06 DIAGNOSIS — M25.50 MULTIPLE JOINT PAIN: ICD-10-CM

## 2024-08-06 DIAGNOSIS — M54.9 CHRONIC NECK AND BACK PAIN: ICD-10-CM

## 2024-08-06 DIAGNOSIS — M54.2 CHRONIC NECK AND BACK PAIN: ICD-10-CM

## 2024-08-06 DIAGNOSIS — F17.210 CIGARETTE NICOTINE DEPENDENCE WITHOUT COMPLICATION: ICD-10-CM

## 2024-08-06 DIAGNOSIS — G89.29 CHRONIC NECK AND BACK PAIN: ICD-10-CM

## 2024-08-06 DIAGNOSIS — R73.03 PREDIABETES: Primary | ICD-10-CM

## 2024-08-06 DIAGNOSIS — I25.83 CORONARY ARTERY DISEASE DUE TO LIPID RICH PLAQUE: ICD-10-CM

## 2024-08-06 DIAGNOSIS — Z86.010 HISTORY OF COLON POLYPS: ICD-10-CM

## 2024-08-06 DIAGNOSIS — G47.33 OBSTRUCTIVE SLEEP APNEA: ICD-10-CM

## 2024-08-06 DIAGNOSIS — F31.62 BIPOLAR DISORDER, CURRENT EPISODE MIXED, MODERATE (HCC): ICD-10-CM

## 2024-08-06 DIAGNOSIS — M10.9 GOUT, UNSPECIFIED CAUSE, UNSPECIFIED CHRONICITY, UNSPECIFIED SITE: ICD-10-CM

## 2024-08-06 LAB — HBA1C MFR BLD: 6.1 %

## 2024-08-06 PROCEDURE — 3074F SYST BP LT 130 MM HG: CPT | Performed by: NURSE PRACTITIONER

## 2024-08-06 PROCEDURE — 83036 HEMOGLOBIN GLYCOSYLATED A1C: CPT | Performed by: NURSE PRACTITIONER

## 2024-08-06 PROCEDURE — 99214 OFFICE O/P EST MOD 30 MIN: CPT | Performed by: NURSE PRACTITIONER

## 2024-08-06 PROCEDURE — 3078F DIAST BP <80 MM HG: CPT | Performed by: NURSE PRACTITIONER

## 2024-08-06 RX ORDER — PREDNISONE 20 MG/1
TABLET ORAL
Qty: 9 TABLET | Refills: 0 | Status: SHIPPED | OUTPATIENT
Start: 2024-08-06 | End: 2024-08-11

## 2024-08-06 RX ORDER — METHOCARBAMOL 500 MG/1
500 TABLET, FILM COATED ORAL 3 TIMES DAILY PRN
Qty: 40 TABLET | Refills: 0 | Status: SHIPPED | OUTPATIENT
Start: 2024-08-06 | End: 2024-08-16

## 2024-08-06 ASSESSMENT — ENCOUNTER SYMPTOMS
EYES NEGATIVE: 1
RESPIRATORY NEGATIVE: 1
BACK PAIN: 1
SORE THROAT: 0
SINUS PAIN: 0
GASTROINTESTINAL NEGATIVE: 1

## 2024-08-06 NOTE — PROGRESS NOTES
Chief Complaint   Patient presents with    Diabetes     Follow up       \"Have you been to the ER, urgent care clinic since your last visit?  Hospitalized since your last visit?\"    NO    “Have you seen or consulted any other health care providers outside of [facility] since your last visit?”    NO            Click Here for Release of Records Request    
develops any SI or HI , seek care immediately.    2. Chronic obstructive pulmonary disease, unspecified COPD type (HCC)   Continue daily Advair as directed.  Rinse mouth after each use.   Continue albuterol inhaler prn for wheezing shortness of breath.    3. Gastroesophageal reflux disease without esophagitis   Continue prilosec as directed.      4. Essential (primary) hypertension   Blood pressure is controlled and continue prinzide and isosorbide as directed.   Check readings at home and notify provider if > 140/90.  - Lipid Panel  - Thyroid Cascade Profile  - Comprehensive Metabolic Panel  - Microalbumin / Creatinine Urine Ratio  - CBC with Auto Differential     5. Tobacco dependence   Continue to encourage smoking cessation and have counseled on risks with continued    use.      6.Varicose veins of bilateral lower extremities with pain   Encourage compression stockings 25-35mmhg daily and remove at  night.      7. Prediabetes  Hemoglobin A1C, POC   Date Value Ref Range Status   08/06/2024 6.1 % Final    Encourage following diabetic diet along with getting regular exercise 3-5 times weekly    for 30-45 minutes.  - AMB POC HEMOGLOBIN A1C     8. Multiple joint pain   Pain primarily now located in neck and low back. Work up as noted below.   No personal or family hx of RA.   Arthritis panel ok except elevated CRP and uric acid. Allopurinol started.   May continue tylenol and ibuprofen w/ food prn. Advised to avoid excess or daily use    of NSAIDS due to risk of side effects with long term use.   Encourage heat and range of motion exercises.   Defer new referral to rheumatology as not joint swelling or pain outside of neck and back concerns.    9. Obstructive sleep apnea   Reports daytime fatigue and habitual snoring.   Tx in past with cpap but did not use long.    Last sleep study > 2 years ago and have sent referral to sleep medicine to evaluate and treat.   Provided contact information to schedule.    10.

## 2024-08-07 LAB
25(OH)D3+25(OH)D2 SERPL-MCNC: 52.7 NG/ML (ref 30–100)
ALBUMIN SERPL-MCNC: 4.3 G/DL (ref 3.9–4.9)
ALBUMIN/CREAT UR: <4 MG/G CREAT (ref 0–29)
ALP SERPL-CCNC: 108 IU/L (ref 44–121)
ALT SERPL-CCNC: 25 IU/L (ref 0–32)
AST SERPL-CCNC: 18 IU/L (ref 0–40)
BASOPHILS # BLD AUTO: 0 X10E3/UL (ref 0–0.2)
BASOPHILS NFR BLD AUTO: 0 %
BILIRUB SERPL-MCNC: 0.3 MG/DL (ref 0–1.2)
BUN SERPL-MCNC: 10 MG/DL (ref 8–27)
BUN/CREAT SERPL: 12 (ref 12–28)
CALCIUM SERPL-MCNC: 9.9 MG/DL (ref 8.7–10.3)
CHLORIDE SERPL-SCNC: 97 MMOL/L (ref 96–106)
CHOLEST SERPL-MCNC: 128 MG/DL (ref 100–199)
CO2 SERPL-SCNC: 25 MMOL/L (ref 20–29)
CREAT SERPL-MCNC: 0.83 MG/DL (ref 0.57–1)
CREAT UR-MCNC: 75 MG/DL
EGFRCR SERPLBLD CKD-EPI 2021: 79 ML/MIN/1.73
EOSINOPHIL # BLD AUTO: 0.1 X10E3/UL (ref 0–0.4)
EOSINOPHIL NFR BLD AUTO: 1 %
ERYTHROCYTE [DISTWIDTH] IN BLOOD BY AUTOMATED COUNT: 18 % (ref 11.7–15.4)
GLOBULIN SER CALC-MCNC: 2.9 G/DL (ref 1.5–4.5)
GLUCOSE SERPL-MCNC: 100 MG/DL (ref 70–99)
HCT VFR BLD AUTO: 39.7 % (ref 34–46.6)
HDLC SERPL-MCNC: 35 MG/DL
HGB BLD-MCNC: 12.2 G/DL (ref 11.1–15.9)
IMM GRANULOCYTES # BLD AUTO: 0 X10E3/UL (ref 0–0.1)
IMM GRANULOCYTES NFR BLD AUTO: 0 %
LDLC SERPL CALC-MCNC: 61 MG/DL (ref 0–99)
LYMPHOCYTES # BLD AUTO: 2.1 X10E3/UL (ref 0.7–3.1)
LYMPHOCYTES NFR BLD AUTO: 27 %
MCH RBC QN AUTO: 22.6 PG (ref 26.6–33)
MCHC RBC AUTO-ENTMCNC: 30.7 G/DL (ref 31.5–35.7)
MCV RBC AUTO: 73 FL (ref 79–97)
MICROALBUMIN UR-MCNC: <3 UG/ML
MONOCYTES # BLD AUTO: 0.4 X10E3/UL (ref 0.1–0.9)
MONOCYTES NFR BLD AUTO: 6 %
NEUTROPHILS # BLD AUTO: 5 X10E3/UL (ref 1.4–7)
NEUTROPHILS NFR BLD AUTO: 66 %
PLATELET # BLD AUTO: 337 X10E3/UL (ref 150–450)
POTASSIUM SERPL-SCNC: 4.5 MMOL/L (ref 3.5–5.2)
PROT SERPL-MCNC: 7.2 G/DL (ref 6–8.5)
RBC # BLD AUTO: 5.41 X10E6/UL (ref 3.77–5.28)
SODIUM SERPL-SCNC: 138 MMOL/L (ref 134–144)
TRIGL SERPL-MCNC: 192 MG/DL (ref 0–149)
TSH SERPL DL<=0.005 MIU/L-ACNC: 0.76 UIU/ML (ref 0.45–4.5)
VLDLC SERPL CALC-MCNC: 32 MG/DL (ref 5–40)
WBC # BLD AUTO: 7.7 X10E3/UL (ref 3.4–10.8)

## 2024-08-08 ENCOUNTER — TELEPHONE (OUTPATIENT)
Facility: CLINIC | Age: 64
End: 2024-08-08

## 2024-08-13 ENCOUNTER — APPOINTMENT (OUTPATIENT)
Facility: HOSPITAL | Age: 64
End: 2024-08-13
Payer: COMMERCIAL

## 2024-08-14 ENCOUNTER — HOSPITAL ENCOUNTER (OUTPATIENT)
Facility: HOSPITAL | Age: 64
Setting detail: RECURRING SERIES
Discharge: HOME | End: 2024-08-17
Payer: COMMERCIAL

## 2024-08-14 PROCEDURE — 97110 THERAPEUTIC EXERCISES: CPT

## 2024-08-14 PROCEDURE — 97161 PT EVAL LOW COMPLEX 20 MIN: CPT

## 2024-08-14 PROCEDURE — G0283 ELEC STIM OTHER THAN WOUND: HCPCS

## 2024-08-14 NOTE — THERAPY EVALUATION
treatments:  Patient will demonstrate independence and compliance with HEP in order to increase mobility and assist with carryover from PT services.  Status at last Eval/Progress Note: HEP issued today  Current Status: see above, Initial Evaluation completed today  Goal Met?  No    2.  Pt to improve cervical rotation to the left by 10 degrees so she can be more aware of her surroundings  Status at last Eval/Progress Note: 63 degrees  Current Status: see above, Initial Evaluation completed today  Goal Met?  No      Long Term Goals, to be met within 10 treatments:  1.Pt improve lumbar flexion by 10 degrees so she can bend down to clean at home  Status at last Eval/Progress Note: currently at 44 degrees  Current Status: see above, Initial Evaluation completed today  Goal Met?  No    2.  Pt to improve UE and LE strength to 5/5 so she can independently clean her home  Status at last Eval/Progress Note: currently limitations at 4/5  Current Status: see above, Initial Evaluation completed today  Goal Met?  No    Frequency / Duration: Patient to be seen 2 times a week for 10 treatments.    Patient/ Caregiver education and instruction: Diagnosis, prognosis, exercises   [x]  Plan of care has been reviewed with PTA        Frnak Buchanan, PT, DPT       2024       2:11 PM        ===================================================================  I certify that the above Therapy Services are being furnished while the patient is under my care. I agree with the treatment plan and certify that this therapy is necessary.    Physician's Signature:_________________________   DATE:_________   TIME:________                           Cecilia Castro, *    ** Signature, Date and Time must be completed for valid certification **  Please sign and fax to [phone].  Thank you      Patient Name:  Divya GardunoDmitri :  1960

## 2024-08-16 ENCOUNTER — APPOINTMENT (OUTPATIENT)
Facility: HOSPITAL | Age: 64
End: 2024-08-16
Payer: COMMERCIAL

## 2024-08-20 ENCOUNTER — HOSPITAL ENCOUNTER (OUTPATIENT)
Facility: HOSPITAL | Age: 64
Setting detail: RECURRING SERIES
Discharge: HOME | End: 2024-08-23
Payer: COMMERCIAL

## 2024-08-20 PROCEDURE — 97110 THERAPEUTIC EXERCISES: CPT

## 2024-08-20 PROCEDURE — G0283 ELEC STIM OTHER THAN WOUND: HCPCS

## 2024-08-20 NOTE — PROGRESS NOTES
PHYSICAL THERAPY - DAILY TREATMENT NOTE (updated 3/23)    Date of Service: 2024        Patient Name:  Divya Borrero :  1960   Medical   Diagnosis:  Chronic neck and back pain [M54.2, M54.9, G89.29] Treatment Diagnosis:  M54.2  NECK PAIN, M54.59  OTHER LOWER BACK PAIN, M54.59, G89.29  CHRONIC LOWER BACK PAIN, and M54.89  OTHER DORSALGIA    Referral Source:  Cecilia Castro, * Insurance:   Payor: [employer] [facility] [facility] [place] / Plan: [employer] / Product Type: *No Product type* /     [x] Patient's date of birth verified                Visit #   Current  / Total 2 10   Time   In / Out 1303 1402   Total Treatment Time (in minutes) 59    Total Timed Codes  (in minutes) 49    [facility] Totals Reminder:    8-22 min = 1 unit; 23-37 min = 2 units; 38-52 min = 3 units;  53-67 min = 4 units; 68-82 min = 5 units    SUBJECTIVE  Pain Level (0-10 scale): 6/10    Any medication changes, allergies to medications, adverse drug reactions, diagnosis change, or new procedure performed?: No  Medications: [x]  Verified on Patient Summary List    Subjective functional status/changes:     \"It's a 6 - it's always a 6 because it never goes away.\"    OBJECTIVE  Therapeutic Procedures:  Tx Min Billable or 1:1 Min (if diff from Tx Min) Procedure, Rationale, Specifics   49  12411 Therapeutic Exercise (timed):  increase ROM, strength, coordination, balance, and proprioception to improve patient's ability to progress to PLOF and address remaining functional goals. (see flow sheet as applicable)   49     Total Total   [x] Patient Education billed concurrently with other procedures    Modalities Rationale:     decrease pain and increase tissue extensibility to improve patient's ability to progress to PLOF and address remaining functional goals.    10   min [x] Estim Unattended,             []  NMES  [x] PreMod       []  IFC  [] Other:  []w/US   []w/ice   [x]w/heat  Position: seated  Location: neck/low back

## 2024-08-22 ENCOUNTER — HOSPITAL ENCOUNTER (OUTPATIENT)
Facility: HOSPITAL | Age: 64
Setting detail: RECURRING SERIES
Discharge: HOME | End: 2024-08-25
Payer: COMMERCIAL

## 2024-08-22 PROCEDURE — G0283 ELEC STIM OTHER THAN WOUND: HCPCS

## 2024-08-22 PROCEDURE — 97110 THERAPEUTIC EXERCISES: CPT

## 2024-08-22 NOTE — PROGRESS NOTES
PHYSICAL THERAPY - DAILY TREATMENT NOTE (updated 3/23)    Date of Service: 2024        Patient Name:  Divya Borrero :  1960   Medical   Diagnosis:  Chronic neck and back pain [M54.2, M54.9, G89.29] Treatment Diagnosis:  M54.2  NECK PAIN and M54.59  OTHER LOWER BACK PAIN    Referral Source:  Cecilia Castro, * Insurance:   Payor: [employer] [facility] / Plan: [employer] [facility] [facility] / Product Type: *No Product type* /     [x] Patient's date of birth verified                Visit #   Current  / Total 3 10   Time   In / Out 14:54 16:16   Total Treatment Time (in minutes) 82    Total Timed Codes  (in minutes) 67    [facility] Totals Reminder:    8-22 min = 1 unit; 23-37 min = 2 units; 38-52 min = 3 units;  53-67 min = 4 units; 68-82 min = 5 units      SUBJECTIVE  Pain Level (0-10 scale): 9/10    Any medication changes, allergies to medications, adverse drug reactions, diagnosis change, or new procedure performed?: No  Medications: [x]  Verified on Patient Summary List    Subjective functional status/changes:     \"I am in a lot of pain.I had a hard time getting out of bed yesterday. I felt good when I left therapy on Tuesday.\"    OBJECTIVE  Therapeutic Procedures:  Tx Min Billable or 1:1 Min (if diff from Tx Min) Procedure, Rationale, Specifics   67 [id number] Therapeutic Exercise (timed):  increase ROM, strength, coordination, balance, and proprioception to improve patient's ability to progress to PLOF and address remaining functional goals. (see flow sheet as applicable)                  67 67    Total Total   [x] Patient Education billed concurrently with other procedures    Modalities Rationale:     decrease edema, decrease inflammation, decrease pain, and increase tissue extensibility to improve patient's ability to progress to PLOF and address remaining functional goals.    15   min [x] Estim Unattended,             []  NMES  [] PreMod       [x]  IFC  [] Other:  []w/US   []w/ice

## 2024-08-27 ENCOUNTER — HOSPITAL ENCOUNTER (OUTPATIENT)
Facility: HOSPITAL | Age: 64
Setting detail: RECURRING SERIES
End: 2024-08-27
Payer: COMMERCIAL

## 2024-08-28 ENCOUNTER — HOSPITAL ENCOUNTER (OUTPATIENT)
Facility: HOSPITAL | Age: 64
Setting detail: RECURRING SERIES
Discharge: HOME | End: 2024-08-31
Payer: COMMERCIAL

## 2024-08-28 ENCOUNTER — HOSPITAL ENCOUNTER (OUTPATIENT)
Facility: HOSPITAL | Age: 64
Discharge: HOME | End: 2024-08-31
Payer: COMMERCIAL

## 2024-08-28 PROCEDURE — G0283 ELEC STIM OTHER THAN WOUND: HCPCS

## 2024-08-28 PROCEDURE — 72050 X-RAY EXAM NECK SPINE 4/5VWS: CPT

## 2024-08-28 PROCEDURE — 72110 X-RAY EXAM L-2 SPINE 4/>VWS: CPT

## 2024-08-28 PROCEDURE — 97110 THERAPEUTIC EXERCISES: CPT

## 2024-08-28 NOTE — PROGRESS NOTES
PHYSICAL THERAPY - DAILY TREATMENT NOTE (updated 3/23)    Date of Service: 2024        Patient Name:  Divya Borrero :  1960   Medical   Diagnosis:  Chronic neck and back pain [M54.2, M54.9, G89.29] Treatment Diagnosis:  M54.2  NECK PAIN and M54.59  OTHER LOWER BACK PAIN    Referral Source:  Cecilia Castro, * Insurance:   Payor: [employer] [facility] / Plan: [employer] [facility] / Product Type: *No Product type* /     [x] Patient's date of birth verified                Visit #   Current  / Total 4 10   Time   In / Out 1039 am 1139 am   Total Treatment Time (in minutes) 60    Total Timed Codes  (in minutes) 48    [facility] Totals Reminder:    8-22 min = 1 unit; 23-37 min = 2 units; 38-52 min = 3 units;  53-67 min = 4 units; 68-82 min = 5 units      SUBJECTIVE  Pain Level (0-10 scale): 10/10    Any medication changes, allergies to medications, adverse drug reactions, diagnosis change, or new procedure performed?: No  Medications: [x]  Verified on Patient Summary List    Subjective functional status/changes:     \"Pt noted that she could not move for at least 3 days after her last session because her back \"locked\" up so much and she hurt..\"    OBJECTIVE  Therapeutic Procedures:  Tx Min Billable or 1:1 Min (if diff from Tx Min) Procedure, Rationale, Specifics   48 48 [id number] Therapeutic Exercise (timed):  increase ROM, strength, coordination, balance, and proprioception to improve patient's ability to progress to PLOF and address remaining functional goals. (see flow sheet as applicable)   48 48    Total Total   [x] Patient Education billed concurrently with other procedures    Modalities Rationale:     decrease pain, increase tissue extensibility, and increase muscle contraction/control to improve patient's ability to progress to PLOF and address remaining functional goals.    12   min [x] Estim Unattended,             []  NMES  [] PreMod       [x]  IFC  [] Other:  []w/US   []w/ice                   Running Visit #    EXERCISE   1   2   3   4   5   6   7   8   9   10      Nustep          Lvl 2 x12'     L2 X 11'  held                  L UT st          B 30\"x2     B 3/30\"                    L levator st         B 30\"x2     B 3/30\"                       Chin tucks          5\"x10    5\" x 10   10\"/5                  Scapular retractions              ---                  Piriformis  str    2 ways 3/20\"            SKTC               ---- 3/20\"                   LTR              ----- X 20                    PPT with bridging              -----  with ball x 2/10               Cervical retraction               10/5\"   with AROM rotation x 10 B                  3 way ball roll      30\"x2    3/30\"   3/320\"               [] Eval only for Running Visit #1, no treatment provided     Modalities to be included future treatment sessions: Vasopneumatic Compression, Electrical Stimulation, Heat Pack, Cold Pack, Cervical Traction, and Ultrasound  Has HEP been provided to patient? [] No    [x] Yes [id number]                                     Access Code:                    Date Provided: 8/14/24  [] Reviewed HEP    [] Progressed/Changed HEP, details:      GOALS  Short Term Goals, to be met within 5 treatments:  Patient will demonstrate independence and compliance with HEP in order to increase mobility and assist with carryover from PT services.  Status at last Eval/Progress Note: HEP issued today  Current Status: HEP issued today  Goal Met?  No     2.  Pt to improve cervical rotation to the left by 10 degrees so she can be more aware of her surroundings  Status at last Eval/Progress Note: 63 degrees  Current Status: 63 degrees  Goal Met?  No     Long Term Goals, to be met within 10 treatments:  1.Pt improve lumbar flexion by 10 degrees so she can bend down to clean at home  Status at last Eval/Progress Note: currently at 44 degrees  Current Status: currently at 44 degrees  Goal Met?  No     2.  Pt to improve UE and

## 2024-08-29 DIAGNOSIS — M54.2 CHRONIC NECK AND BACK PAIN: ICD-10-CM

## 2024-08-29 DIAGNOSIS — G89.29 CHRONIC NECK AND BACK PAIN: ICD-10-CM

## 2024-08-29 DIAGNOSIS — M54.9 CHRONIC NECK AND BACK PAIN: ICD-10-CM

## 2024-08-29 RX ORDER — METHOCARBAMOL 500 MG/1
500 TABLET, FILM COATED ORAL 3 TIMES DAILY PRN
Qty: 40 TABLET | Refills: 0 | Status: SHIPPED | OUTPATIENT
Start: 2024-08-29

## 2024-08-30 ENCOUNTER — HOSPITAL ENCOUNTER (OUTPATIENT)
Facility: HOSPITAL | Age: 64
Setting detail: RECURRING SERIES
End: 2024-08-30
Payer: COMMERCIAL

## 2024-09-03 ENCOUNTER — HOSPITAL ENCOUNTER (OUTPATIENT)
Facility: HOSPITAL | Age: 64
Setting detail: RECURRING SERIES
End: 2024-09-03
Payer: COMMERCIAL

## 2024-09-13 ENCOUNTER — HOSPITAL ENCOUNTER (OUTPATIENT)
Facility: HOSPITAL | Age: 64
Setting detail: RECURRING SERIES
Discharge: HOME | End: 2024-09-16
Payer: COMMERCIAL

## 2024-09-13 PROCEDURE — 97110 THERAPEUTIC EXERCISES: CPT

## 2024-09-13 PROCEDURE — G0283 ELEC STIM OTHER THAN WOUND: HCPCS

## 2024-09-17 ENCOUNTER — HOSPITAL ENCOUNTER (OUTPATIENT)
Facility: HOSPITAL | Age: 64
Setting detail: RECURRING SERIES
End: 2024-09-17
Payer: COMMERCIAL

## 2024-09-18 ENCOUNTER — APPOINTMENT (OUTPATIENT)
Facility: HOSPITAL | Age: 64
End: 2024-09-18
Payer: COMMERCIAL

## 2024-09-19 DIAGNOSIS — G89.29 CHRONIC NECK AND BACK PAIN: ICD-10-CM

## 2024-09-19 DIAGNOSIS — M54.2 CHRONIC NECK AND BACK PAIN: ICD-10-CM

## 2024-09-19 DIAGNOSIS — M54.9 CHRONIC NECK AND BACK PAIN: ICD-10-CM

## 2024-09-19 RX ORDER — METHOCARBAMOL 500 MG/1
500 TABLET, FILM COATED ORAL 3 TIMES DAILY PRN
Qty: 40 TABLET | Refills: 0 | Status: SHIPPED | OUTPATIENT
Start: 2024-09-19

## 2024-09-19 RX ORDER — OMEPRAZOLE 40 MG/1
40 CAPSULE, DELAYED RELEASE ORAL DAILY
Qty: 90 CAPSULE | Refills: 1 | Status: SHIPPED | OUTPATIENT
Start: 2024-09-19

## 2024-09-23 ENCOUNTER — HOSPITAL ENCOUNTER (OUTPATIENT)
Facility: HOSPITAL | Age: 64
Setting detail: RECURRING SERIES
Discharge: HOME | End: 2024-09-26
Payer: COMMERCIAL

## 2024-09-23 PROCEDURE — G0283 ELEC STIM OTHER THAN WOUND: HCPCS

## 2024-09-23 PROCEDURE — 97110 THERAPEUTIC EXERCISES: CPT

## 2024-10-02 ENCOUNTER — APPOINTMENT (OUTPATIENT)
Facility: HOSPITAL | Age: 64
End: 2024-10-02
Payer: COMMERCIAL

## 2024-10-02 ENCOUNTER — OFFICE VISIT (OUTPATIENT)
Facility: CLINIC | Age: 64
End: 2024-10-02

## 2024-10-02 VITALS
OXYGEN SATURATION: 94 % | DIASTOLIC BLOOD PRESSURE: 68 MMHG | SYSTOLIC BLOOD PRESSURE: 112 MMHG | HEART RATE: 74 BPM | TEMPERATURE: 98.1 F | RESPIRATION RATE: 18 BRPM

## 2024-10-02 DIAGNOSIS — M54.12 CERVICAL RADICULOPATHY: Primary | ICD-10-CM

## 2024-10-02 DIAGNOSIS — M54.16 LUMBAR RADICULOPATHY: ICD-10-CM

## 2024-10-02 DIAGNOSIS — Z23 NEEDS FLU SHOT: ICD-10-CM

## 2024-10-02 RX ORDER — DIVALPROEX SODIUM 500 MG/1
500 TABLET, DELAYED RELEASE ORAL DAILY
COMMUNITY

## 2024-10-02 RX ORDER — TRAZODONE HYDROCHLORIDE 150 MG/1
TABLET ORAL
COMMUNITY
Start: 2024-09-20

## 2024-10-02 RX ORDER — PREDNISONE 20 MG/1
20 TABLET ORAL 2 TIMES DAILY
Qty: 10 TABLET | Refills: 0 | Status: SHIPPED | OUTPATIENT
Start: 2024-10-02 | End: 2024-10-07

## 2024-10-02 RX ORDER — DIVALPROEX SODIUM 250 MG/1
250 TABLET, DELAYED RELEASE ORAL DAILY
COMMUNITY

## 2024-10-02 ASSESSMENT — ENCOUNTER SYMPTOMS
EYES NEGATIVE: 1
BACK PAIN: 1
RESPIRATORY NEGATIVE: 1
SINUS PAIN: 0
GASTROINTESTINAL NEGATIVE: 1
SORE THROAT: 0

## 2024-10-02 NOTE — PROGRESS NOTES
Subjective  Chief Complaint   Patient presents with    Joint Pain     HPI:  Divya Borrero is a 64 y.o. female with PMH of copd, GERD, bipolar disorder, hypertension, joint pain and tobacco dependence who presents for follow up of both neck and back pain. Has completed 6 weeks of physical therapy but reports no improvement. Feels that pain, especially in low back has possibly worsened. Reports numbness and pain that radiates down both legs. Also experiencing pain in neck along with intermittent numbness in left arm.No significant weakness or bowel/bladder changes. Requires use of cane or walker when ambulating.  Attempted to schedule with spine surgeon but was told she had to complete physical therapy as well as have MRI first. Taking tylenol and robaxin prn for pain. Also using heat prn. Has difficulty sleeping due to pain.      Past Medical History:   Diagnosis Date    Anemia 08/16/2022    Anxiety 2009    Upon 's death    Arthritis     Asthma     Bipolar disorder, unspecified (HCC) 01/28/2019    Blood in stool 06/28/2017    BLOOD IN STOOL    DR VERDUGO'S NOTE    Bowel obstruction (HCC) 2014    Chronic back pain 1997    DJD    Chronic obstructive pulmonary disease (HCC)     Per PCP note    Depression 12/13/2022    CHRONIC    Essential (primary) hypertension 01/28/2019    GERD (gastroesophageal reflux disease)     Hiatal hernia 12/13/2022    Hx of small bowel obstruction 2014    [place]    Hypercholesteremia 01/28/2019    Ill-defined condition     vein    Rectal bleeding 11/13/2017    Restless legs syndrome 2023    Sciatica 01/28/2019    Sleep apnea     Patient stated does not use a CPAP    Stress incontinence     Varicose veins of both lower extremities     PER PCP NOTE    Ventral hernia         Past Surgical History:   Procedure Laterality Date    COLONOSCOPY  12/16/2014    RECTAL POLYP, DIVERTICULOSIS    COLONOSCOPY  11/17/2017    HX OF COLON POLYPS, DIVERTICULOSIS    COLONOSCOPY N/A

## 2024-10-02 NOTE — PROGRESS NOTES
Chief Complaint   Patient presents with    prediabetes    Joint Pain       6 week follow up     Still having pain and has done PT. Pt wants a MRI          \"Have you been to the ER, urgent care clinic since your last visit?  Hospitalized since your last visit?\"    NO    “Have you seen or consulted any other health care providers outside our system since your last visit?”    NO

## 2024-10-03 ENCOUNTER — HOSPITAL ENCOUNTER (OUTPATIENT)
Facility: HOSPITAL | Age: 64
Setting detail: RECURRING SERIES
Discharge: HOME | End: 2024-10-06
Payer: COMMERCIAL

## 2024-10-03 PROCEDURE — 97110 THERAPEUTIC EXERCISES: CPT

## 2024-10-03 PROCEDURE — G0283 ELEC STIM OTHER THAN WOUND: HCPCS

## 2024-10-03 NOTE — PROGRESS NOTES
PHYSICAL THERAPY - DAILY TREATMENT NOTE (updated 3/23)    Date of Service: 10/3/2024        Patient Name:  Divya Borrero :  1960   Medical   Diagnosis:  Chronic neck and back pain [M54.2, M54.9, G89.29] Treatment Diagnosis:  M54.2  NECK PAIN and M54.59  OTHER LOWER BACK PAIN    Referral Source:  Cecilia Castro, * Insurance:   Payor: [employer] [facility] / Plan: [employer] [facility] [facility] / Product Type: *No Product type* /     [x] Patient's date of birth verified                Visit #   Current  / Total 7 10   Time   In / Out 1430 1524   Total Treatment Time (in minutes) 54    Total Timed Codes  (in minutes) 44    [facility] Totals Reminder:    8-22 min = 1 unit; 23-37 min = 2 units; 38-52 min = 3 units;  53-67 min = 4 units; 68-82 min = 5 units      SUBJECTIVE  Pain Level (0-10 scale): 5/10    Any medication changes, allergies to medications, adverse drug reactions, diagnosis change, or new procedure performed?: No  Medications: [x]  Verified on Patient Summary List    Subjective functional status/changes:     \"Pt reports she was felling better and able to get up and move today.\"    OBJECTIVE  Therapeutic Procedures:  Tx Min Billable or 1:1 Min (if diff from Tx Min) Procedure, Rationale, Specifics   44 44 [id number] Therapeutic Exercise (timed):  increase ROM, strength, coordination, balance, and proprioception to improve patient's ability to progress to PLOF and address remaining functional goals. (see flow sheet as applicable)   44 44    Total Total   [x] Patient Education billed concurrently with other procedures    Modalities Rationale:     decrease pain, increase tissue extensibility, and increase muscle contraction/control to improve patient's ability to progress to PLOF and address remaining functional goals.    10   min [x] Estim Unattended,             []  NMES  [x] PreMod       []  IFC  [] Other:  []w/US   []w/ice   [x]w/heat  Position: seated   Location: neck and back

## 2024-10-07 ENCOUNTER — HOSPITAL ENCOUNTER (OUTPATIENT)
Facility: HOSPITAL | Age: 64
Setting detail: RECURRING SERIES
Discharge: HOME | End: 2024-10-10
Payer: COMMERCIAL

## 2024-10-07 PROCEDURE — G0283 ELEC STIM OTHER THAN WOUND: HCPCS

## 2024-10-07 PROCEDURE — 97110 THERAPEUTIC EXERCISES: CPT

## 2024-10-07 NOTE — PROGRESS NOTES
PHYSICAL THERAPY - DAILY TREATMENT NOTE (updated 3/23)    Date of Service: 10/7/2024        Patient Name:  Divya Borrero :  1960   Medical   Diagnosis:  Chronic neck and back pain [M54.2, M54.9, G89.29] Treatment Diagnosis:  M54.2  NECK PAIN and M54.59  OTHER LOWER BACK PAIN    Referral Source:  Cecilia Castro, * Insurance:   Payor: [employer] [facility] / Plan: [employer] [facility] [facility] / Product Type: *No Product type* /     [x] Patient's date of birth verified                Visit #   Current  / Total 8 10   Time   In / Out 1506 1600   Total Treatment Time (in minutes) 54    Total Timed Codes  (in minutes) 44    [facility] Totals Reminder:    8-22 min = 1 unit; 23-37 min = 2 units; 38-52 min = 3 units;  53-67 min = 4 units; 68-82 min = 5 units      SUBJECTIVE  Pain Level (0-10 scale): 2/10    Any medication changes, allergies to medications, adverse drug reactions, diagnosis change, or new procedure performed?: No  Medications: [x]  Verified on Patient Summary List    Subjective functional status/changes:     \"I am feeling a lot better today. I might even try cutting the grass today.\"    OBJECTIVE  Therapeutic Procedures:  Tx Min Billable or 1:1 Min (if diff from Tx Min) Procedure, Rationale, Specifics   44 44 60900 Therapeutic Exercise (timed):  increase ROM, strength, coordination, balance, and proprioception to improve patient's ability to progress to PLOF and address remaining functional goals. (see flow sheet as applicable)   44 44    Total Total   [x] Patient Education billed concurrently with other procedures    Modalities Rationale:     decrease pain, increase tissue extensibility, and increase muscle contraction/control to improve patient's ability to progress to PLOF and address remaining functional goals.    10   min [x] Estim Unattended,             []  NMES  [x] PreMod       []  IFC  [] Other:  []w/US   []w/ice   [x]w/heat  Position: seated   Location: neck and back

## 2024-10-08 ENCOUNTER — HOSPITAL ENCOUNTER (OUTPATIENT)
Facility: HOSPITAL | Age: 64
Setting detail: RECURRING SERIES
End: 2024-10-08
Payer: COMMERCIAL

## 2024-10-08 DIAGNOSIS — M54.9 CHRONIC NECK AND BACK PAIN: ICD-10-CM

## 2024-10-08 DIAGNOSIS — G89.29 CHRONIC NECK AND BACK PAIN: ICD-10-CM

## 2024-10-08 DIAGNOSIS — M54.2 CHRONIC NECK AND BACK PAIN: ICD-10-CM

## 2024-10-08 RX ORDER — METHOCARBAMOL 500 MG/1
500 TABLET, FILM COATED ORAL 3 TIMES DAILY PRN
Qty: 40 TABLET | Refills: 0 | Status: SHIPPED | OUTPATIENT
Start: 2024-10-08

## 2024-10-10 ENCOUNTER — HOSPITAL ENCOUNTER (OUTPATIENT)
Facility: HOSPITAL | Age: 64
Setting detail: RECURRING SERIES
Discharge: HOME | End: 2024-10-13
Payer: COMMERCIAL

## 2024-10-10 PROCEDURE — 97110 THERAPEUTIC EXERCISES: CPT

## 2024-10-10 NOTE — PROGRESS NOTES
Running Visit #    EXERCISE   1   2   3   4   5   6   7   8   9   10      Nustep          Lvl 2 x12'     L2 X 11'  held   D/C use   Cardiac rehab one lvl1 x7 min post stretches  Cardiac rehab one lvl1 x9 min post stretches          L UT st          B 30\"x2     B 3/30\"      B 3/20\"      B 30\"x3   B 20\"x3    B 30\"x3        L levator st         B 30\"x2     B 3/30\"           --     B 30\"x3   B 20\"x3   B 30\"x3           Chin tucks          5\"x10    5\" x 10   10\"/5    10x5 sec hold  AROM x 10 ea + CT       AROM x 10 ea + CT      pulleys         Flex / HAB x 3' ea       Scapular retractions              ---       x 20       x20  X5' hold x 10       Piriformis  str       2 ways 3/20\"    3x30 sec B 2 ways   3x30 sec B              SKTC               ---- 3/20\"   3x30 sec B towel 3x30 sec B       3x30 sec B          LTR              ----- X 20   X 20  with ball   X 20  with ball     X 20          PPT with bridging              -----  with ball x 2/10  with ball x 2/10  with ball x 2/10        2/10     Cervical retraction               10/5\"   with AROM rotation x 10 B Supine x10 ea  With AROM         --        3 way ball roll      30\"x2    3/30\"   3/320\" Hold per pt request   3 way prayer str 3/20\"   3 way prayer str 3x30 sec   3 way prayer str 3x30 sec 3 way prayer str 3x30 sec      [] Eval only for Running Visit #1, no treatment provided     Modalities to be included future treatment sessions: Vasopneumatic Compression, Electrical Stimulation, Heat Pack, Cold Pack, Cervical Traction, and Ultrasound  Has HEP been provided to patient? [] No    [x] Yes            Access Code:[email or web address]    Date Provided: 8/14/24  [] Reviewed HEP    [] Progressed/Changed HEP, details:      GOALS  Short Term Goals, to be met within 5 treatments:  Patient will demonstrate independence and compliance with HEP in order to increase mobility and assist with carryover from PT services.  Status at last Eval/Progress Note: HEP issued today  Current

## 2024-10-13 DIAGNOSIS — E55.9 VITAMIN D DEFICIENCY: ICD-10-CM

## 2024-10-13 RX ORDER — ERGOCALCIFEROL 1.25 MG/1
50000 CAPSULE, LIQUID FILLED ORAL WEEKLY
Qty: 8 CAPSULE | Refills: 0 | Status: SHIPPED | OUTPATIENT
Start: 2024-10-13

## 2024-10-16 ENCOUNTER — HOSPITAL ENCOUNTER (OUTPATIENT)
Facility: HOSPITAL | Age: 64
Setting detail: RECURRING SERIES
Discharge: HOME | End: 2024-10-19
Payer: COMMERCIAL

## 2024-10-16 PROCEDURE — 97110 THERAPEUTIC EXERCISES: CPT

## 2024-10-16 NOTE — PROGRESS NOTES
PHYSICAL THERAPY - DAILY TREATMENT NOTE (updated 3/23)    Date of Service: 10/16/2024        Patient Name:  Divya Borrero :  1960   Medical   Diagnosis:  Chronic neck and back pain [M54.2, M54.9, G89.29] Treatment Diagnosis:  M54.2  NECK PAIN and M54.59  OTHER LOWER BACK PAIN    Referral Source:  Cecilia Castro, * Insurance:   Payor: [employer] [facility] / Plan: [employer] [facility] [facility] / Product Type: *No Product type* /     [x] Patient's date of birth verified                Visit #   Current  / Total 10 10   Time   In / Out 1500 1545   Total Treatment Time (in minutes) 45    Total Timed Codes  (in minutes) 45    [facility] Totals Reminder:    8-22 min = 1 unit; 23-37 min = 2 units; 38-52 min = 3 units;  53-67 min = 4 units; 68-82 min = 5 units      SUBJECTIVE  Pain Level (0-10 scale): 1/10 neck and 3/10 back     Any medication changes, allergies to medications, adverse drug reactions, diagnosis change, or new procedure performed?: No  Medications: [x]  Verified on Patient Summary List    Subjective functional status/changes:     \"I am doing not too bad today. I will be getting a MRI on 10/22/2024.\"    OBJECTIVE  Therapeutic Procedures:  Tx Min Billable or 1:1 Min (if diff from Tx Min) Procedure, Rationale, Specifics   45  [id number] Therapeutic Exercise (timed):  increase ROM, strength, coordination, balance, and proprioception to improve patient's ability to progress to PLOF and address remaining functional goals. (see flow sheet as applicable)   45     Total Total   [x] Patient Education billed concurrently with other procedures                                             Cervical AROM:                           Flexion                                                    50 degrees                                                   Extension                                                40 degrees

## 2024-10-16 NOTE — THERAPY DISCHARGE
[facility]  [address] [place] [id number]  Ph: [phone]     Fax: [phone]    PHYSICAL THERAPY DISCHARGE SUMMARY  Patient Name: Divya Borrero : 1960   Treatment/Medical Diagnosis: Chronic neck and back pain [M54.2, M54.9, G89.29]   Referral Source: Cecilia Castro, *     Date of Initial Visit: 2024 Attended Visits: 10 Missed Visits: 5     SUMMARY OF TREATMENT/CURRENT STATUS  Patient tolerated treatment fair today with updated measurements obtained. She has demonstrated improvements in ROM of the cervical and lumbar spine. She still has pain, but it is not as severe now. She is doing more at home, but still has limited tolerance to standing for more than a short stent of time. She will be getting a MRI on 10/22/2024 and returning to [place] for follow up. She is hoping to see a specialist after the MRI results are finalized. She was encouraged to continue with HEP to maintain current progress. She also was encouraged to contact us with any questions or concerns. She is DC from PT services at this time.     STATUS OF GOALS  Short Term Goals, to be met within 5 treatments:  Patient will demonstrate independence and compliance with HEP in order to increase mobility and assist with carryover from PT services.  Status at last Eval/Progress Note: HEP issued today  Current Status: performing when she can  Goal Met?  In Progress     2.  Pt to improve cervical rotation to the left by 10 degrees so she can be more aware of her surroundings.  Status at last Eval/Progress Note: 63 degrees  Current Status: 70 degrees  Goal Met?  Yes     Long Term Goals, to be met within 10 treatments:  1.Pt improve lumbar flexion by 10 degrees so she can bend down to clean at home.  Status at last Eval/Progress Note: currently at 44 degrees  Current Status: 50 deg  Goal Met?  In Progress     2.  Pt to improve UE and LE strength to 5/5 so she can independently clean

## 2024-10-22 ENCOUNTER — TELEPHONE (OUTPATIENT)
Facility: CLINIC | Age: 64
End: 2024-10-22

## 2024-10-22 NOTE — TELEPHONE ENCOUNTER
Patient would like for you to give her a call.  It is in regards to the MRI that was attempted today.  She had a panic attack and could not complete.

## 2024-11-05 ENCOUNTER — TELEPHONE (OUTPATIENT)
Facility: CLINIC | Age: 64
End: 2024-11-05

## 2024-11-05 ENCOUNTER — OFFICE VISIT (OUTPATIENT)
Age: 64
End: 2024-11-05
Payer: COMMERCIAL

## 2024-11-05 VITALS
HEART RATE: 80 BPM | HEIGHT: 61 IN | WEIGHT: 167.8 LBS | BODY MASS INDEX: 31.68 KG/M2 | OXYGEN SATURATION: 97 % | RESPIRATION RATE: 14 BRPM | DIASTOLIC BLOOD PRESSURE: 80 MMHG | SYSTOLIC BLOOD PRESSURE: 122 MMHG | TEMPERATURE: 97.3 F

## 2024-11-05 DIAGNOSIS — R09.A2 GLOBUS SENSATION: Primary | ICD-10-CM

## 2024-11-05 DIAGNOSIS — E78.00 HYPERCHOLESTEREMIA: ICD-10-CM

## 2024-11-05 DIAGNOSIS — K21.9 GASTROESOPHAGEAL REFLUX DISEASE, UNSPECIFIED WHETHER ESOPHAGITIS PRESENT: ICD-10-CM

## 2024-11-05 DIAGNOSIS — H69.91 EUSTACHIAN TUBE DYSFUNCTION, RIGHT: ICD-10-CM

## 2024-11-05 DIAGNOSIS — R13.19 ESOPHAGEAL DYSPHAGIA: Primary | ICD-10-CM

## 2024-11-05 PROCEDURE — 99214 OFFICE O/P EST MOD 30 MIN: CPT | Performed by: INTERNAL MEDICINE

## 2024-11-05 PROCEDURE — 3074F SYST BP LT 130 MM HG: CPT | Performed by: INTERNAL MEDICINE

## 2024-11-05 PROCEDURE — 3079F DIAST BP 80-89 MM HG: CPT | Performed by: INTERNAL MEDICINE

## 2024-11-05 RX ORDER — FLUTICASONE PROPIONATE AND SALMETEROL 50; 250 UG/1; UG/1
POWDER RESPIRATORY (INHALATION)
Qty: 60 EACH | Refills: 2 | Status: SHIPPED | OUTPATIENT
Start: 2024-11-05

## 2024-11-05 RX ORDER — LISINOPRIL AND HYDROCHLOROTHIAZIDE 20; 25 MG/1; MG/1
1 TABLET ORAL EVERY MORNING
Qty: 90 TABLET | Refills: 1 | Status: SHIPPED | OUTPATIENT
Start: 2024-11-05

## 2024-11-05 RX ORDER — ATORVASTATIN CALCIUM 20 MG/1
20 TABLET, FILM COATED ORAL DAILY
Qty: 90 TABLET | Refills: 1 | Status: SHIPPED | OUTPATIENT
Start: 2024-11-05

## 2024-11-05 ASSESSMENT — PATIENT HEALTH QUESTIONNAIRE - PHQ9
1. LITTLE INTEREST OR PLEASURE IN DOING THINGS: SEVERAL DAYS
SUM OF ALL RESPONSES TO PHQ9 QUESTIONS 1 & 2: 1
SUM OF ALL RESPONSES TO PHQ QUESTIONS 1-9: 1
2. FEELING DOWN, DEPRESSED OR HOPELESS: NOT AT ALL
SUM OF ALL RESPONSES TO PHQ QUESTIONS 1-9: 1

## 2024-11-05 ASSESSMENT — ENCOUNTER SYMPTOMS
NAUSEA: 0
ALLERGIC/IMMUNOLOGIC NEGATIVE: 1
DIARRHEA: 0
CONSTIPATION: 0
BLOOD IN STOOL: 0
VOMITING: 0
RESPIRATORY NEGATIVE: 1
RECTAL PAIN: 0
ABDOMINAL DISTENTION: 0
ABDOMINAL PAIN: 1
ANAL BLEEDING: 0

## 2024-11-06 ENCOUNTER — PREP FOR PROCEDURE (OUTPATIENT)
Age: 64
End: 2024-11-06

## 2024-11-06 DIAGNOSIS — R13.19 ESOPHAGEAL DYSPHAGIA: ICD-10-CM

## 2024-11-06 NOTE — PROGRESS NOTES
1. Have you been to the ER, urgent care clinic since your last visit?  Hospitalized since your last visit?no    2. Have you seen or consulted any other health care providers outside of the [facility] since your last visit?  Include any pap smears or colon screening.  No   Chief Complaint   Patient presents with    Dysphagia     Referred by Dr Joe.    Problems swallowing for a long time     /80 (Site: Left Upper Arm, Position: Sitting, Cuff Size: Medium Adult)   Pulse 80   Temp 97.3 °F (36.3 °C) (Temporal)   Resp 14   Ht 1.549 m (5' 1\")   Wt 76.1 kg (167 lb 12.8 oz)   SpO2 97% Comment: room air  BMI 31.71 kg/m²       
EGD AND ED ARE SCHEDULED FOR 11-15-24.  NO PA REQUIRED  
31.71 kg/m²     Physical Exam  Vitals and nursing note reviewed.   Constitutional:       Appearance: Normal appearance. She is obese.   HENT:      Head: Normocephalic and atraumatic.      Nose: Nose normal.   Eyes:      General: No scleral icterus.  Cardiovascular:      Rate and Rhythm: Normal rate and regular rhythm.      Pulses: Normal pulses.      Heart sounds: Normal heart sounds.   Pulmonary:      Effort: Pulmonary effort is normal.      Breath sounds: Normal breath sounds.   Abdominal:      General: Abdomen is flat. There is no distension.      Palpations: Abdomen is soft. There is no mass.      Tenderness: There is abdominal tenderness. There is no right CVA tenderness, left CVA tenderness, guarding or rebound.      Hernia: No hernia is present.   Musculoskeletal:      Right lower leg: No edema.      Left lower leg: No edema.   Skin:     General: Skin is warm and dry.   Neurological:      General: No focal deficit present.      Mental Status: She is alert and oriented to person, place, and time. Mental status is at baseline.   Psychiatric:         Mood and Affect: Mood normal.         Behavior: Behavior normal.         Thought Content: Thought content normal.         Judgment: Judgment normal.        1. Esophageal dysphagia  We will schedule patient for an upper endoscopy with esophageal dilatation.  - UPPER GI ENDOSCOPY,DIAGNOSIS    2. Gastroesophageal reflux disease, unspecified whether esophagitis present  Continue the omeprazole 40 mg daily.  - UPPER GI ENDOSCOPY,DIAGNOSIS

## 2024-11-08 ENCOUNTER — HOSPITAL ENCOUNTER (OUTPATIENT)
Age: 64
Discharge: HOME | End: 2024-11-11
Payer: COMMERCIAL

## 2024-11-08 DIAGNOSIS — M54.16 LUMBAR RADICULOPATHY: ICD-10-CM

## 2024-11-08 DIAGNOSIS — M54.12 CERVICAL RADICULOPATHY: ICD-10-CM

## 2024-11-08 PROCEDURE — 72148 MRI LUMBAR SPINE W/O DYE: CPT

## 2024-11-08 PROCEDURE — 72141 MRI NECK SPINE W/O DYE: CPT

## 2024-11-11 DIAGNOSIS — G89.29 CHRONIC NECK AND BACK PAIN: ICD-10-CM

## 2024-11-11 DIAGNOSIS — M54.9 CHRONIC NECK AND BACK PAIN: ICD-10-CM

## 2024-11-11 DIAGNOSIS — M54.2 CHRONIC NECK AND BACK PAIN: ICD-10-CM

## 2024-11-11 RX ORDER — METHOCARBAMOL 500 MG/1
500 TABLET, FILM COATED ORAL 3 TIMES DAILY PRN
Qty: 40 TABLET | Refills: 0 | Status: SHIPPED | OUTPATIENT
Start: 2024-11-11

## 2024-11-11 RX ORDER — ISOSORBIDE MONONITRATE 30 MG/1
30 TABLET, EXTENDED RELEASE ORAL DAILY
Qty: 90 TABLET | Refills: 0 | Status: SHIPPED | OUTPATIENT
Start: 2024-11-11

## 2024-11-13 ENCOUNTER — TELEPHONE (OUTPATIENT)
Age: 64
End: 2024-11-13

## 2024-11-13 NOTE — TELEPHONE ENCOUNTER
I spoke with Divya, she said the stress test is in hold, pending medicaid auth. She said to cancel the EGD, and she will call back to reschedule.  Need cardiac clearance first.

## 2024-11-21 ENCOUNTER — OFFICE VISIT (OUTPATIENT)
Facility: CLINIC | Age: 64
End: 2024-11-21
Payer: COMMERCIAL

## 2024-11-21 VITALS
BODY MASS INDEX: 32.1 KG/M2 | TEMPERATURE: 99 F | HEART RATE: 92 BPM | HEIGHT: 61 IN | DIASTOLIC BLOOD PRESSURE: 75 MMHG | OXYGEN SATURATION: 96 % | SYSTOLIC BLOOD PRESSURE: 149 MMHG | RESPIRATION RATE: 18 BRPM | WEIGHT: 170 LBS

## 2024-11-21 DIAGNOSIS — J06.9 URI WITH COUGH AND CONGESTION: Primary | ICD-10-CM

## 2024-11-21 LAB
EXP DATE SOLUTION: NORMAL
EXP DATE SWAB: NORMAL
EXPIRATION DATE: NORMAL
LOT NUMBER POC: NORMAL
LOT NUMBER SOLUTION: NORMAL
LOT NUMBER SWAB: NORMAL
SARS-COV-2 RNA, POC: NEGATIVE

## 2024-11-21 PROCEDURE — 3077F SYST BP >= 140 MM HG: CPT

## 2024-11-21 PROCEDURE — 87635 SARS-COV-2 COVID-19 AMP PRB: CPT

## 2024-11-21 PROCEDURE — 3078F DIAST BP <80 MM HG: CPT

## 2024-11-21 PROCEDURE — 99213 OFFICE O/P EST LOW 20 MIN: CPT

## 2024-11-21 RX ORDER — GUAIFENESIN 600 MG/1
600 TABLET, EXTENDED RELEASE ORAL 2 TIMES DAILY
Qty: 20 TABLET | Refills: 0 | Status: SHIPPED | OUTPATIENT
Start: 2024-11-21 | End: 2024-12-01

## 2024-11-21 RX ORDER — PREDNISONE 20 MG/1
TABLET ORAL
Qty: 9 TABLET | Refills: 0 | Status: SHIPPED | OUTPATIENT
Start: 2024-11-21

## 2024-11-21 RX ORDER — DOXYCYCLINE HYCLATE 100 MG
100 TABLET ORAL 2 TIMES DAILY
Qty: 14 TABLET | Refills: 0 | Status: SHIPPED | OUTPATIENT
Start: 2024-11-21 | End: 2024-11-28

## 2024-11-21 ASSESSMENT — PATIENT HEALTH QUESTIONNAIRE - PHQ9
SUM OF ALL RESPONSES TO PHQ QUESTIONS 1-9: 1
2. FEELING DOWN, DEPRESSED OR HOPELESS: SEVERAL DAYS
1. LITTLE INTEREST OR PLEASURE IN DOING THINGS: NOT AT ALL
SUM OF ALL RESPONSES TO PHQ9 QUESTIONS 1 & 2: 1
SUM OF ALL RESPONSES TO PHQ QUESTIONS 1-9: 1

## 2024-11-21 ASSESSMENT — ENCOUNTER SYMPTOMS
COUGH: 1
SINUS PRESSURE: 1
WHEEZING: 0
GASTROINTESTINAL NEGATIVE: 1
SORE THROAT: 0
CHEST TIGHTNESS: 0
SHORTNESS OF BREATH: 0

## 2024-11-21 NOTE — PROGRESS NOTES
Chief Complaint   Patient presents with    Pain     Hurts to cough    Cough     \"Have you been to the ER, urgent care clinic since your last visit?  Hospitalized since your last visit?\"    NO    “Have you seen or consulted any other health care providers outside our system since your last visit?”    NO    BP (!) 149/75 (Site: Right Upper Arm, Position: Sitting, Cuff Size: Medium Adult)   Pulse 92   Temp 99 °F (37.2 °C) (Oral)   Resp 18   Ht 1.549 m (5' 1\")   Wt 77.1 kg (170 lb)   SpO2 96%   BMI 32.12 kg/m²

## 2024-11-21 NOTE — PROGRESS NOTES
Divya Borrero is a 64 y.o. female who presents to the office today for the following:    Chief Complaint   Patient presents with    Pain     Hurts to cough    Cough       Past Medical History:   Diagnosis Date    Anemia 08/16/2022    Anxiety 2009    Upon 's death    Arthritis     Asthma     Bipolar disorder, unspecified (HCC) 01/28/2019    Blood in stool 06/28/2017    BLOOD IN STOOL    DR VERDUGO'S NOTE    Bowel obstruction (HCC) 2014    Chronic back pain 1997    DJD    Chronic obstructive pulmonary disease (HCC)     Per PCP note    Depression 12/13/2022    CHRONIC    Dysphagia     Essential (primary) hypertension 01/28/2019    GERD (gastroesophageal reflux disease)     Hiatal hernia 12/13/2022    Hx of small bowel obstruction 2014    [place]    Hypercholesteremia 01/28/2019    Ill-defined condition     vein    Rectal bleeding 11/13/2017    Restless legs syndrome 2023    Sciatica 01/28/2019    Sleep apnea     Patient stated does not use a CPAP    Stress incontinence     Varicose veins of both lower extremities     PER PCP NOTE    Ventral hernia         Past Surgical History:   Procedure Laterality Date    COLONOSCOPY  12/16/2014    RECTAL POLYP, DIVERTICULOSIS    COLONOSCOPY  11/17/2017    HX OF COLON POLYPS, DIVERTICULOSIS    COLONOSCOPY N/A 06/27/2024    COLONOSCOPY biopsy performed by Dg Butcher Jr., MD at [facility] ENDOSCOPY    COLONOSCOPY  06/27/2024    HERNIA REPAIR  08/19/2022    REPAIR RECURRENT VENTRAL ABDOMINAL HERNIA, EXCISION OF ABDOMINAL WALL STITCH    HERNIA REPAIR      ventral hernia repair times 3 with mesh    HYSTERECTOMY (CERVIX STATUS UNKNOWN)      partial    HYSTERECTOMY, VAGINAL      ORTHOPEDIC SURGERY      Bunionectomy right great toe    OTHER SURGICAL HISTORY      Epiglottis repair due to allergic reaction    PARTIAL HYSTERECTOMY (CERVIX NOT REMOVED)  2005    Partial hysterectomy cervix still intact    UMBILICAL HERNIA REPAIR  2008        Family History   Problem

## 2024-11-25 ENCOUNTER — TELEPHONE (OUTPATIENT)
Facility: CLINIC | Age: 64
End: 2024-11-25

## 2024-11-25 DIAGNOSIS — J06.9 URI WITH COUGH AND CONGESTION: Primary | ICD-10-CM

## 2024-11-25 NOTE — TELEPHONE ENCOUNTER
Patient called in stated she is not feeling any better, and is requesting a order for a Chest Xray

## 2024-11-25 NOTE — PROGRESS NOTES
No significant improvement since starting doxycyline, prednisone and mucinex 11/21/24. Will put in order for CXR to rule out pneumonia.

## 2024-11-26 ENCOUNTER — HOSPITAL ENCOUNTER (OUTPATIENT)
Facility: HOSPITAL | Age: 64
Discharge: HOME | End: 2024-11-29
Payer: COMMERCIAL

## 2024-11-26 DIAGNOSIS — J06.9 URI WITH COUGH AND CONGESTION: ICD-10-CM

## 2024-11-26 PROCEDURE — 71046 X-RAY EXAM CHEST 2 VIEWS: CPT

## 2024-11-27 ENCOUNTER — TELEPHONE (OUTPATIENT)
Facility: CLINIC | Age: 64
End: 2024-11-27

## 2024-11-27 DIAGNOSIS — J06.9 URI WITH COUGH AND CONGESTION: Primary | ICD-10-CM

## 2024-11-27 RX ORDER — AZITHROMYCIN 500 MG/1
500 TABLET, FILM COATED ORAL DAILY
Qty: 3 TABLET | Refills: 0 | Status: SHIPPED | OUTPATIENT
Start: 2024-11-27 | End: 2024-11-30

## 2024-11-27 NOTE — TELEPHONE ENCOUNTER
Patient was called for notification of negative CXR and apparently is not feeling any better despite doxycycline. Will go ahead and send zithromax at patient request. She can follow up with PCP if no better in 1 week.

## 2024-12-11 ENCOUNTER — TELEPHONE (OUTPATIENT)
Facility: CLINIC | Age: 64
End: 2024-12-11

## 2024-12-26 ENCOUNTER — TELEPHONE (OUTPATIENT)
Facility: CLINIC | Age: 64
End: 2024-12-26

## 2024-12-26 NOTE — TELEPHONE ENCOUNTER
Pt came in and stated she had to leave work due to back pain and she needs a note to cover her for today.She has went home to rest .  Please review and advise

## 2024-12-27 DIAGNOSIS — M54.9 CHRONIC NECK AND BACK PAIN: ICD-10-CM

## 2024-12-27 DIAGNOSIS — M54.2 CHRONIC NECK AND BACK PAIN: ICD-10-CM

## 2024-12-27 DIAGNOSIS — G89.29 CHRONIC NECK AND BACK PAIN: ICD-10-CM

## 2024-12-27 RX ORDER — METHOCARBAMOL 500 MG/1
500 TABLET, FILM COATED ORAL 3 TIMES DAILY PRN
Qty: 40 TABLET | Refills: 0 | Status: SHIPPED | OUTPATIENT
Start: 2024-12-27

## 2025-01-04 DIAGNOSIS — M25.50 MULTIPLE JOINT PAIN: ICD-10-CM

## 2025-01-06 RX ORDER — ALLOPURINOL 300 MG/1
300 TABLET ORAL DAILY
Qty: 90 TABLET | Refills: 1 | Status: SHIPPED | OUTPATIENT
Start: 2025-01-06

## 2025-01-08 ENCOUNTER — PATIENT MESSAGE (OUTPATIENT)
Facility: CLINIC | Age: 65
End: 2025-01-08

## 2025-01-08 DIAGNOSIS — G89.29 CHRONIC NECK AND BACK PAIN: Primary | ICD-10-CM

## 2025-01-08 DIAGNOSIS — M54.2 CHRONIC NECK AND BACK PAIN: Primary | ICD-10-CM

## 2025-01-08 DIAGNOSIS — M54.9 CHRONIC NECK AND BACK PAIN: Primary | ICD-10-CM

## 2025-01-09 RX ORDER — PREDNISONE 20 MG/1
TABLET ORAL
Qty: 9 TABLET | Refills: 0 | Status: SHIPPED | OUTPATIENT
Start: 2025-01-09 | End: 2025-01-14

## 2025-01-15 ENCOUNTER — OFFICE VISIT (OUTPATIENT)
Facility: CLINIC | Age: 65
End: 2025-01-15
Payer: COMMERCIAL

## 2025-01-15 VITALS
SYSTOLIC BLOOD PRESSURE: 146 MMHG | RESPIRATION RATE: 20 BRPM | TEMPERATURE: 98.2 F | BODY MASS INDEX: 30.4 KG/M2 | HEART RATE: 82 BPM | DIASTOLIC BLOOD PRESSURE: 74 MMHG | WEIGHT: 161 LBS | OXYGEN SATURATION: 96 % | HEIGHT: 61 IN

## 2025-01-15 DIAGNOSIS — R09.A2 GLOBUS SENSATION: ICD-10-CM

## 2025-01-15 DIAGNOSIS — M54.16 LUMBAR RADICULOPATHY: Primary | ICD-10-CM

## 2025-01-15 PROCEDURE — 99213 OFFICE O/P EST LOW 20 MIN: CPT | Performed by: NURSE PRACTITIONER

## 2025-01-15 PROCEDURE — 3078F DIAST BP <80 MM HG: CPT | Performed by: NURSE PRACTITIONER

## 2025-01-15 PROCEDURE — 3077F SYST BP >= 140 MM HG: CPT | Performed by: NURSE PRACTITIONER

## 2025-01-15 RX ORDER — MELOXICAM 15 MG/1
15 TABLET ORAL DAILY PRN
Qty: 30 TABLET | Refills: 2 | Status: SHIPPED | OUTPATIENT
Start: 2025-01-15

## 2025-01-15 ASSESSMENT — ENCOUNTER SYMPTOMS
GASTROINTESTINAL NEGATIVE: 1
RESPIRATORY NEGATIVE: 1
BACK PAIN: 1
SORE THROAT: 0
SINUS PAIN: 0
EYES NEGATIVE: 1

## 2025-01-15 ASSESSMENT — PATIENT HEALTH QUESTIONNAIRE - PHQ9
SUM OF ALL RESPONSES TO PHQ QUESTIONS 1-9: 1
SUM OF ALL RESPONSES TO PHQ QUESTIONS 1-9: 1
1. LITTLE INTEREST OR PLEASURE IN DOING THINGS: NOT AT ALL
SUM OF ALL RESPONSES TO PHQ QUESTIONS 1-9: 1
2. FEELING DOWN, DEPRESSED OR HOPELESS: SEVERAL DAYS
SUM OF ALL RESPONSES TO PHQ9 QUESTIONS 1 & 2: 1
SUM OF ALL RESPONSES TO PHQ QUESTIONS 1-9: 1

## 2025-01-15 NOTE — PROGRESS NOTES
Chief Complaint   Patient presents with    Back Pain     \"Have you been to the ER, urgent care clinic since your last visit?  Hospitalized since your last visit?\"    NO    “Have you seen or consulted any other health care providers outside our system since your last visit?”    NO    BP (!) 146/74 (Site: Left Upper Arm, Position: Sitting, Cuff Size: Medium Adult)   Pulse 82   Temp 98.2 °F (36.8 °C) (Oral)   Resp 20   Ht 1.549 m (5' 1\")   Wt 73 kg (161 lb)   SpO2 96%   BMI 30.42 kg/m²              
HOURS AS NEEDED 18 g 3    ipratropium 0.5 mg-albuterol 2.5 mg (DUONEB) 0.5-2.5 (3) MG/3ML SOLN nebulizer solution Inhale 3 mLs into the lungs every 4 hours as needed for Shortness of Breath 360 mL 1    hydrOXYzine HCl (ATARAX) 25 MG tablet Take 1 tablet by mouth daily       No current facility-administered medications on file prior to visit.       Orders Placed This Encounter   Medications    meloxicam (MOBIC) 15 MG tablet     Sig: Take 1 tablet by mouth daily as needed for Pain     Dispense:  30 tablet     Refill:  2        Allergies   Allergen Reactions    Latex Itching    Astemizole Anaphylaxis    Caplyta [Lumateperone]      EPS movement disorder       Review of Systems   Constitutional:  Positive for fatigue. Negative for activity change, appetite change, chills, diaphoresis, fever and unexpected weight change.   HENT:  Negative for congestion, ear discharge, ear pain, postnasal drip, sinus pain, sneezing and sore throat.    Eyes: Negative.    Respiratory: Negative.     Cardiovascular: Negative.    Gastrointestinal: Negative.    Genitourinary: Negative.    Musculoskeletal:  Positive for arthralgias, back pain, gait problem and neck pain.   Skin: Negative.    Neurological:  Positive for numbness. Negative for dizziness, syncope, speech difficulty, weakness and headaches.   Hematological: Negative.    Psychiatric/Behavioral:  Positive for dysphoric mood. Negative for agitation, hallucinations, sleep disturbance and suicidal ideas. The patient is nervous/anxious.           Objective    Vitals:    01/15/25 1602   BP: (!) 146/74   Pulse: 82   Resp: 20   Temp: 98.2 °F (36.8 °C)   SpO2: 96%       Physical Exam  Vitals and nursing note reviewed.   Constitutional:       Appearance: Normal appearance. She is obese.   HENT:      Right Ear: Tympanic membrane normal. There is no impacted cerumen.      Left Ear: Tympanic membrane normal. There is no impacted cerumen.      Mouth/Throat:      Mouth: Mucous membranes are moist.

## 2025-01-16 DIAGNOSIS — E55.9 VITAMIN D DEFICIENCY: ICD-10-CM

## 2025-01-16 RX ORDER — ALBUTEROL SULFATE 90 UG/1
INHALANT RESPIRATORY (INHALATION)
Qty: 18 G | Refills: 5 | Status: SHIPPED | OUTPATIENT
Start: 2025-01-16

## 2025-01-16 RX ORDER — ERGOCALCIFEROL 1.25 MG/1
50000 CAPSULE, LIQUID FILLED ORAL WEEKLY
Qty: 8 CAPSULE | Refills: 1 | Status: SHIPPED | OUTPATIENT
Start: 2025-01-16

## 2025-01-24 ENCOUNTER — PATIENT MESSAGE (OUTPATIENT)
Facility: CLINIC | Age: 65
End: 2025-01-24

## 2025-01-24 DIAGNOSIS — Z74.09 IMPAIRED MOBILITY AND ACTIVITIES OF DAILY LIVING: ICD-10-CM

## 2025-01-24 DIAGNOSIS — M54.16 LUMBAR RADICULOPATHY: Primary | ICD-10-CM

## 2025-01-24 DIAGNOSIS — Z78.9 IMPAIRED MOBILITY AND ACTIVITIES OF DAILY LIVING: ICD-10-CM

## 2025-01-28 ENCOUNTER — TELEPHONE (OUTPATIENT)
Facility: CLINIC | Age: 65
End: 2025-01-28

## 2025-01-30 ENCOUNTER — TRANSCRIBE ORDERS (OUTPATIENT)
Facility: HOSPITAL | Age: 65
End: 2025-01-30

## 2025-01-30 DIAGNOSIS — Z12.31 VISIT FOR SCREENING MAMMOGRAM: Primary | ICD-10-CM

## 2025-02-24 DIAGNOSIS — G89.29 CHRONIC NECK AND BACK PAIN: ICD-10-CM

## 2025-02-24 DIAGNOSIS — M54.2 CHRONIC NECK AND BACK PAIN: ICD-10-CM

## 2025-02-24 DIAGNOSIS — M54.9 CHRONIC NECK AND BACK PAIN: ICD-10-CM

## 2025-02-24 RX ORDER — METHOCARBAMOL 500 MG/1
500 TABLET, FILM COATED ORAL 3 TIMES DAILY PRN
Qty: 40 TABLET | Refills: 0 | Status: SHIPPED | OUTPATIENT
Start: 2025-02-24

## 2025-03-29 DIAGNOSIS — T78.40XD ALLERGY, SUBSEQUENT ENCOUNTER: ICD-10-CM

## 2025-03-29 RX ORDER — FLUTICASONE PROPIONATE 50 MCG
SPRAY, SUSPENSION (ML) NASAL
Qty: 16 G | Refills: 0 | Status: SHIPPED | OUTPATIENT
Start: 2025-03-29

## 2025-04-01 ENCOUNTER — PATIENT MESSAGE (OUTPATIENT)
Facility: CLINIC | Age: 65
End: 2025-04-01

## 2025-04-01 DIAGNOSIS — F17.210 CIGARETTE NICOTINE DEPENDENCE WITHOUT COMPLICATION: Primary | ICD-10-CM

## 2025-04-01 RX ORDER — NICOTINE 21 MG/24HR
1 PATCH, TRANSDERMAL 24 HOURS TRANSDERMAL DAILY
Qty: 42 PATCH | Refills: 0 | Status: SHIPPED | OUTPATIENT
Start: 2025-04-01 | End: 2025-05-13

## 2025-04-17 ENCOUNTER — OFFICE VISIT (OUTPATIENT)
Facility: CLINIC | Age: 65
End: 2025-04-17
Payer: COMMERCIAL

## 2025-04-17 VITALS
HEART RATE: 81 BPM | DIASTOLIC BLOOD PRESSURE: 78 MMHG | BODY MASS INDEX: 29.44 KG/M2 | SYSTOLIC BLOOD PRESSURE: 135 MMHG | OXYGEN SATURATION: 98 % | TEMPERATURE: 97.7 F | HEIGHT: 62 IN | RESPIRATION RATE: 18 BRPM | WEIGHT: 160 LBS

## 2025-04-17 DIAGNOSIS — M54.9 CHRONIC NECK AND BACK PAIN: ICD-10-CM

## 2025-04-17 DIAGNOSIS — I10 ESSENTIAL (PRIMARY) HYPERTENSION: ICD-10-CM

## 2025-04-17 DIAGNOSIS — K21.9 GASTRO-ESOPHAGEAL REFLUX DISEASE WITHOUT ESOPHAGITIS: ICD-10-CM

## 2025-04-17 DIAGNOSIS — J01.90 ACUTE SINUSITIS, RECURRENCE NOT SPECIFIED, UNSPECIFIED LOCATION: ICD-10-CM

## 2025-04-17 DIAGNOSIS — G89.29 CHRONIC NECK AND BACK PAIN: ICD-10-CM

## 2025-04-17 DIAGNOSIS — Z86.0100 HISTORY OF COLON POLYPS: ICD-10-CM

## 2025-04-17 DIAGNOSIS — T78.40XD ALLERGY, SUBSEQUENT ENCOUNTER: ICD-10-CM

## 2025-04-17 DIAGNOSIS — G47.33 OBSTRUCTIVE SLEEP APNEA: ICD-10-CM

## 2025-04-17 DIAGNOSIS — I25.10 CORONARY ARTERY DISEASE DUE TO LIPID RICH PLAQUE: ICD-10-CM

## 2025-04-17 DIAGNOSIS — M10.9 GOUT, UNSPECIFIED CAUSE, UNSPECIFIED CHRONICITY, UNSPECIFIED SITE: ICD-10-CM

## 2025-04-17 DIAGNOSIS — J44.9 CHRONIC OBSTRUCTIVE PULMONARY DISEASE, UNSPECIFIED COPD TYPE (HCC): ICD-10-CM

## 2025-04-17 DIAGNOSIS — E78.00 HYPERCHOLESTEREMIA: ICD-10-CM

## 2025-04-17 DIAGNOSIS — E55.9 VITAMIN D DEFICIENCY: ICD-10-CM

## 2025-04-17 DIAGNOSIS — R73.03 PREDIABETES: ICD-10-CM

## 2025-04-17 DIAGNOSIS — I25.83 CORONARY ARTERY DISEASE DUE TO LIPID RICH PLAQUE: ICD-10-CM

## 2025-04-17 DIAGNOSIS — M54.2 CHRONIC NECK AND BACK PAIN: ICD-10-CM

## 2025-04-17 DIAGNOSIS — F17.210 CIGARETTE NICOTINE DEPENDENCE WITHOUT COMPLICATION: ICD-10-CM

## 2025-04-17 DIAGNOSIS — I83.93 VARICOSE VEINS OF BOTH LOWER EXTREMITIES, UNSPECIFIED WHETHER COMPLICATED: ICD-10-CM

## 2025-04-17 DIAGNOSIS — M25.50 MULTIPLE JOINT PAIN: ICD-10-CM

## 2025-04-17 DIAGNOSIS — F31.62 BIPOLAR DISORDER, CURRENT EPISODE MIXED, MODERATE (HCC): Primary | ICD-10-CM

## 2025-04-17 LAB — HBA1C MFR BLD: 6.3 %

## 2025-04-17 PROCEDURE — 3075F SYST BP GE 130 - 139MM HG: CPT | Performed by: NURSE PRACTITIONER

## 2025-04-17 PROCEDURE — 99214 OFFICE O/P EST MOD 30 MIN: CPT | Performed by: NURSE PRACTITIONER

## 2025-04-17 PROCEDURE — 3078F DIAST BP <80 MM HG: CPT | Performed by: NURSE PRACTITIONER

## 2025-04-17 PROCEDURE — 83036 HEMOGLOBIN GLYCOSYLATED A1C: CPT | Performed by: NURSE PRACTITIONER

## 2025-04-17 RX ORDER — CARIPRAZINE 1.5 MG/1
CAPSULE, GELATIN COATED ORAL
COMMUNITY
Start: 2025-02-24

## 2025-04-17 RX ORDER — TOPIRAMATE 25 MG/1
25 TABLET, FILM COATED ORAL NIGHTLY
COMMUNITY
Start: 2025-03-24

## 2025-04-17 RX ORDER — GABAPENTIN 100 MG/1
100 CAPSULE ORAL 3 TIMES DAILY
COMMUNITY
Start: 2025-04-06

## 2025-04-17 RX ORDER — BUPROPION HYDROCHLORIDE 75 MG/1
75 TABLET ORAL EVERY MORNING
COMMUNITY
Start: 2025-03-24

## 2025-04-17 RX ORDER — PREDNISONE 20 MG/1
TABLET ORAL
Qty: 9 TABLET | Refills: 0 | Status: SHIPPED | OUTPATIENT
Start: 2025-04-17 | End: 2025-04-23

## 2025-04-17 RX ORDER — AZITHROMYCIN 250 MG/1
TABLET, FILM COATED ORAL
Qty: 6 TABLET | Refills: 0 | Status: SHIPPED | OUTPATIENT
Start: 2025-04-17 | End: 2025-04-27

## 2025-04-17 NOTE — PROGRESS NOTES
Subjective  Chief Complaint   Patient presents with    Hypertension       History of Present Illness  The patient presents for follow up of chronic conditions with PMH of copd, GERD, bipolar disorder, hypertension, joint pain and tobacco dependence    A progressive worsening of right sciatic is reported, characterized by a sensation of  an electrical shock-like pain during ambulation. Mobility is significantly compromised, necessitating the use of a cane for support. On damp days, she is unable to leave her bed and is confined to her room.  Despite being under the care of an orthopedic surgeon who has recommended cortisone injections, they have not yet been received due to pending authorization from [employer]. Communication with the office occurred about 2 weeks ago.    Is following with psychiatry for bipolar disorder. Had Vraylar recently added and tolerating at this time. Does continue with depression which has been heightened due to health concerns. Denies any suicidal or homicidal thoughts.    She had to reschedule her stress test because it was a rainy day and she could not get out of bed. Also has not scheduled sleep study yet.    She has had sinus congestion, headache, cough and wheezing over the past 3 weeks. Not using anything OTC.  Reports sinus pressure and tenderness. Continues Advair and Albuterol prn for wheezing. No fever, chest pain or breathing difficulty. No known sick contacts.       Past Medical History:   Diagnosis Date    Anemia 08/16/2022    Anxiety 2009    Upon 's death    Arthritis     Asthma     Bipolar disorder, unspecified (HCC) 01/28/2019    Blood in stool 06/28/2017    BLOOD IN STOOL    DR VERDUGO'S NOTE    Bowel obstruction (HCC) 2014    Chronic back pain 1997    DJD    Chronic obstructive pulmonary disease (HCC)     Per PCP note    Depression 12/13/2022    CHRONIC    Dysphagia     Essential (primary) hypertension 01/28/2019    GERD (gastroesophageal reflux disease)     Hiatal

## 2025-04-17 NOTE — PROGRESS NOTES
Chief Complaint   Patient presents with    Hypertension     Follow up         \"Have you been to the ER, urgent care clinic since your last visit?  Hospitalized since your last visit?\"    NO    “Have you seen or consulted any other health care providers outside our system since your last visit?”    NO

## 2025-04-21 ASSESSMENT — ENCOUNTER SYMPTOMS
STRIDOR: 0
COUGH: 1
WHEEZING: 1
APNEA: 0
CHEST TIGHTNESS: 0
SINUS PRESSURE: 1
SINUS PAIN: 1
CHOKING: 0
SHORTNESS OF BREATH: 0

## 2025-04-30 ENCOUNTER — HOSPITAL ENCOUNTER (OUTPATIENT)
Facility: HOSPITAL | Age: 65
Discharge: HOME | End: 2025-05-03
Payer: COMMERCIAL

## 2025-04-30 DIAGNOSIS — Z12.31 VISIT FOR SCREENING MAMMOGRAM: ICD-10-CM

## 2025-04-30 PROCEDURE — 77063 BREAST TOMOSYNTHESIS BI: CPT

## 2025-05-02 ENCOUNTER — RESULTS FOLLOW-UP (OUTPATIENT)
Facility: CLINIC | Age: 65
End: 2025-05-02

## 2025-06-12 DIAGNOSIS — E55.9 VITAMIN D DEFICIENCY: ICD-10-CM

## 2025-06-12 RX ORDER — ERGOCALCIFEROL 1.25 MG/1
50000 CAPSULE, LIQUID FILLED ORAL WEEKLY
Qty: 8 CAPSULE | Refills: 0 | Status: SHIPPED | OUTPATIENT
Start: 2025-06-12

## 2025-06-12 RX ORDER — LISINOPRIL AND HYDROCHLOROTHIAZIDE 20; 25 MG/1; MG/1
1 TABLET ORAL EVERY MORNING
Qty: 90 TABLET | Refills: 0 | Status: SHIPPED | OUTPATIENT
Start: 2025-06-12

## (undated) DEVICE — TUBING, SUCTION, 9/32" X 10', STRAIGHT: Brand: MEDLINE

## (undated) DEVICE — GOWN,NON-REINFORCED,XXL: Brand: MEDLINE

## (undated) DEVICE — DRAPE THER FLUID WARMING 66X44 IN FLAT SLUSH DBL DISC ORS

## (undated) DEVICE — DRESSING ADH N ADH 8X35 IN 6X175 IN SFT CLTH MEDIPORE +

## (undated) DEVICE — BASIC SINGLE BASIN-LF: Brand: MEDLINE INDUSTRIES, INC.

## (undated) DEVICE — INTENDED FOR TISSUE SEPARATION, AND OTHER PROCEDURES THAT REQUIRE A SHARP SURGICAL BLADE TO PUNCTURE OR CUT.: Brand: BARD-PARKER ® CARBON RIB-BACK BLADES

## (undated) DEVICE — BINDER ABD H12IN FOR 30-45IN WAIST UNIV 4 PNL PREM DSGN E

## (undated) DEVICE — TOWEL SURG W17XL27IN STD BLU COT NONFENESTRATED PREWASHED

## (undated) DEVICE — SPONGE GZ W4XL4IN COT 12 PLY TYP VII WVN C FLD DSGN STERILE

## (undated) DEVICE — JELLY,LUBE,STERILE,FLIP TOP,TUBE,4-OZ: Brand: MEDLINE

## (undated) DEVICE — MAJOR LAP PROCEDURE PACK: Brand: MEDLINE INDUSTRIES, INC.

## (undated) DEVICE — YANKAUER,BULB TIP,W/O VENT,RIGID,STERILE: Brand: MEDLINE

## (undated) DEVICE — PREP SKN CHLRAPRP APL 26ML STR --

## (undated) DEVICE — SUTURE ABSORBABLE MONOFILAMENT 1-0 CT1 27 IN VIO PDS + PDP341H

## (undated) DEVICE — ULNAR NERVE PROTECTOR FOAM POSITIONER: Brand: CARDINAL HEALTH

## (undated) DEVICE — GLOVE ORANGE PI 7 1/2   MSG9075

## (undated) DEVICE — 3-0 COATED VICRYL PLUS UNDYED 1X27" SH --

## (undated) DEVICE — SOLUTION IRRIG 1000ML 0.9% SOD CHL USP POUR PLAS BTL

## (undated) DEVICE — PAD,PREPPING,CUFFED,24X48,7",NONSTERILE: Brand: MEDLINE

## (undated) DEVICE — SYRINGE IRRIG 60ML SFT PLIABLE BLB EZ TO GRP 1 HND USE W/

## (undated) DEVICE — LINE SAMPLING ADVANCE ORAL NASAL MICROSTREAM O2 TUBING 6.5'

## (undated) DEVICE — GARMENT,MEDLINE,DVT,INT,CALF,MED, GEN2: Brand: MEDLINE

## (undated) DEVICE — SEALER TISS L20CM DIA13MM ADV BPLR L CRV JAW OPN APPRCH

## (undated) DEVICE — SOLUTION IRRIG 1000ML STRL H2O USP PLAS POUR BTL

## (undated) DEVICE — SOUTHSIDE TURNOVER: Brand: MEDLINE INDUSTRIES, INC.

## (undated) DEVICE — FORCEPS BX L240CM JAW DIA2.4MM ORNG L CAP W/ NDL DISP RAD

## (undated) DEVICE — SYR 10ML LUER LOK 1/5ML GRAD --

## (undated) DEVICE — 1200CC GUARDIAN II: Brand: GUARDIAN

## (undated) DEVICE — DRAPE,REIN 53X77,STERILE: Brand: MEDLINE